# Patient Record
Sex: FEMALE | Race: WHITE | NOT HISPANIC OR LATINO | Employment: UNEMPLOYED | URBAN - NONMETROPOLITAN AREA
[De-identification: names, ages, dates, MRNs, and addresses within clinical notes are randomized per-mention and may not be internally consistent; named-entity substitution may affect disease eponyms.]

---

## 2017-02-27 ENCOUNTER — APPOINTMENT (OUTPATIENT)
Dept: GENERAL RADIOLOGY | Facility: HOSPITAL | Age: 42
End: 2017-02-27

## 2017-02-27 ENCOUNTER — HOSPITAL ENCOUNTER (EMERGENCY)
Facility: HOSPITAL | Age: 42
Discharge: HOME | End: 2017-02-27
Attending: FAMILY MEDICINE | Admitting: EMERGENCY MEDICINE

## 2017-02-27 ENCOUNTER — APPOINTMENT (OUTPATIENT)
Dept: CT IMAGING | Facility: HOSPITAL | Age: 42
End: 2017-02-27

## 2017-02-27 VITALS
SYSTOLIC BLOOD PRESSURE: 117 MMHG | HEART RATE: 96 BPM | WEIGHT: 278 LBS | DIASTOLIC BLOOD PRESSURE: 91 MMHG | HEIGHT: 64 IN | RESPIRATION RATE: 16 BRPM | BODY MASS INDEX: 47.46 KG/M2 | TEMPERATURE: 98.4 F | OXYGEN SATURATION: 99 %

## 2017-02-27 DIAGNOSIS — R11.2 NON-INTRACTABLE VOMITING WITH NAUSEA, UNSPECIFIED VOMITING TYPE: Primary | ICD-10-CM

## 2017-02-27 LAB
ABO GROUP BLD: NORMAL
ALBUMIN SERPL-MCNC: 4.4 G/DL (ref 3.5–5)
ALBUMIN/GLOB SERPL: 1.1 G/DL (ref 1.5–2.5)
ALP SERPL-CCNC: 77 U/L (ref 35–104)
ALT SERPL W P-5'-P-CCNC: 33 U/L (ref 10–36)
AMPHET+METHAMPHET UR QL: NEGATIVE
AMYLASE SERPL-CCNC: 67 U/L (ref 28–100)
ANION GAP SERPL CALCULATED.3IONS-SCNC: 8.6 MMOL/L (ref 3.6–11.2)
APTT PPP: 27 SECONDS (ref 24.4–31)
AST SERPL-CCNC: 25 U/L (ref 10–30)
B-HCG UR QL: NEGATIVE
BACTERIA UR QL AUTO: ABNORMAL /HPF
BARBITURATES UR QL SCN: NEGATIVE
BASOPHILS # BLD AUTO: 0.04 10*3/MM3 (ref 0–0.3)
BASOPHILS NFR BLD AUTO: 0.4 % (ref 0–2)
BENZODIAZ UR QL SCN: NEGATIVE
BILIRUB SERPL-MCNC: 0.4 MG/DL (ref 0.2–1.8)
BILIRUB UR QL STRIP: NEGATIVE
BLD GP AB SCN SERPL QL: NEGATIVE
BUN BLD-MCNC: 10 MG/DL (ref 7–21)
BUN/CREAT SERPL: 10.8 (ref 7–25)
CALCIUM SPEC-SCNC: 9.9 MG/DL (ref 7.7–10)
CANNABINOIDS SERPL QL: NEGATIVE
CHLORIDE SERPL-SCNC: 104 MMOL/L (ref 99–112)
CLARITY UR: CLEAR
CO2 SERPL-SCNC: 29.4 MMOL/L (ref 24.3–31.9)
COCAINE UR QL: NEGATIVE
COLOR UR: YELLOW
CREAT BLD-MCNC: 0.93 MG/DL (ref 0.43–1.29)
CRP SERPL-MCNC: 0.91 MG/DL (ref 0–0.99)
D-LACTATE SERPL-SCNC: 1.2 MMOL/L (ref 0.5–2)
DEPRECATED RDW RBC AUTO: 41 FL (ref 37–54)
EOSINOPHIL # BLD AUTO: 0.28 10*3/MM3 (ref 0–0.7)
EOSINOPHIL NFR BLD AUTO: 2.8 % (ref 0–5)
ERYTHROCYTE [DISTWIDTH] IN BLOOD BY AUTOMATED COUNT: 13.3 % (ref 11.5–14.5)
GFR SERPL CREATININE-BSD FRML MDRD: 66 ML/MIN/1.73
GLOBULIN UR ELPH-MCNC: 3.9 GM/DL
GLUCOSE BLD-MCNC: 122 MG/DL (ref 70–110)
GLUCOSE UR STRIP-MCNC: NEGATIVE MG/DL
HCG SERPL QL: NEGATIVE
HCT VFR BLD AUTO: 44.4 % (ref 37–47)
HGB BLD-MCNC: 14.7 G/DL (ref 12–16)
HGB UR QL STRIP.AUTO: NEGATIVE
HYALINE CASTS UR QL AUTO: ABNORMAL /LPF
IMM GRANULOCYTES # BLD: 0.02 10*3/MM3 (ref 0–0.03)
IMM GRANULOCYTES NFR BLD: 0.2 % (ref 0–0.5)
INR PPP: 0.9 (ref 0.8–1.1)
KETONES UR QL STRIP: NEGATIVE
LEUKOCYTE ESTERASE UR QL STRIP.AUTO: ABNORMAL
LIPASE SERPL-CCNC: 36 U/L (ref 13–60)
LYMPHOCYTES # BLD AUTO: 4.38 10*3/MM3 (ref 1–3)
LYMPHOCYTES NFR BLD AUTO: 43.9 % (ref 21–51)
MCH RBC QN AUTO: 28.7 PG (ref 27–33)
MCHC RBC AUTO-ENTMCNC: 33.1 G/DL (ref 33–37)
MCV RBC AUTO: 86.5 FL (ref 80–94)
METHADONE UR QL SCN: NEGATIVE
MONOCYTES # BLD AUTO: 0.71 10*3/MM3 (ref 0.1–0.9)
MONOCYTES NFR BLD AUTO: 7.1 % (ref 0–10)
NEUTROPHILS # BLD AUTO: 4.54 10*3/MM3 (ref 1.4–6.5)
NEUTROPHILS NFR BLD AUTO: 45.6 % (ref 30–70)
NITRITE UR QL STRIP: NEGATIVE
OPIATES UR QL: NEGATIVE
OSMOLALITY SERPL CALC.SUM OF ELEC: 283.5 MOSM/KG (ref 273–305)
OXYCODONE UR QL SCN: NEGATIVE
PCP UR QL SCN: NEGATIVE
PH UR STRIP.AUTO: 5.5 [PH] (ref 5–8)
PLATELET # BLD AUTO: 308 10*3/MM3 (ref 130–400)
PMV BLD AUTO: 8.9 FL (ref 6–10)
POTASSIUM BLD-SCNC: 4 MMOL/L (ref 3.5–5.3)
PROPOXYPH UR QL: NEGATIVE
PROT SERPL-MCNC: 8.3 G/DL (ref 6–8)
PROT UR QL STRIP: NEGATIVE
PROTHROMBIN TIME: 9.9 SECONDS (ref 9.8–11.9)
RBC # BLD AUTO: 5.13 10*6/MM3 (ref 4.2–5.4)
RBC # UR: ABNORMAL /HPF
REF LAB TEST METHOD: ABNORMAL
RH BLD: POSITIVE
SODIUM BLD-SCNC: 142 MMOL/L (ref 135–153)
SP GR UR STRIP: <=1.005 (ref 1–1.03)
SQUAMOUS #/AREA URNS HPF: ABNORMAL /HPF
TROPONIN I SERPL-MCNC: <0.006 NG/ML
TROPONIN I SERPL-MCNC: <0.006 NG/ML
UROBILINOGEN UR QL STRIP: ABNORMAL
WBC NRBC COR # BLD: 9.97 10*3/MM3 (ref 4.5–12.5)
WBC UR QL AUTO: ABNORMAL /HPF

## 2017-02-27 PROCEDURE — 85610 PROTHROMBIN TIME: CPT | Performed by: FAMILY MEDICINE

## 2017-02-27 PROCEDURE — 87086 URINE CULTURE/COLONY COUNT: CPT | Performed by: FAMILY MEDICINE

## 2017-02-27 PROCEDURE — 71275 CT ANGIOGRAPHY CHEST: CPT

## 2017-02-27 PROCEDURE — 80307 DRUG TEST PRSMV CHEM ANLYZR: CPT | Performed by: FAMILY MEDICINE

## 2017-02-27 PROCEDURE — 86900 BLOOD TYPING SEROLOGIC ABO: CPT

## 2017-02-27 PROCEDURE — 86850 RBC ANTIBODY SCREEN: CPT

## 2017-02-27 PROCEDURE — 81025 URINE PREGNANCY TEST: CPT | Performed by: FAMILY MEDICINE

## 2017-02-27 PROCEDURE — 93010 ELECTROCARDIOGRAM REPORT: CPT | Performed by: INTERNAL MEDICINE

## 2017-02-27 PROCEDURE — 86901 BLOOD TYPING SEROLOGIC RH(D): CPT

## 2017-02-27 PROCEDURE — 83690 ASSAY OF LIPASE: CPT | Performed by: FAMILY MEDICINE

## 2017-02-27 PROCEDURE — 81001 URINALYSIS AUTO W/SCOPE: CPT | Performed by: FAMILY MEDICINE

## 2017-02-27 PROCEDURE — 84703 CHORIONIC GONADOTROPIN ASSAY: CPT | Performed by: FAMILY MEDICINE

## 2017-02-27 PROCEDURE — 83605 ASSAY OF LACTIC ACID: CPT | Performed by: FAMILY MEDICINE

## 2017-02-27 PROCEDURE — 86140 C-REACTIVE PROTEIN: CPT | Performed by: FAMILY MEDICINE

## 2017-02-27 PROCEDURE — 71010 HC CHEST PA OR AP: CPT

## 2017-02-27 PROCEDURE — 99284 EMERGENCY DEPT VISIT MOD MDM: CPT

## 2017-02-27 PROCEDURE — 71275 CT ANGIOGRAPHY CHEST: CPT | Performed by: RADIOLOGY

## 2017-02-27 PROCEDURE — 85025 COMPLETE CBC W/AUTO DIFF WBC: CPT | Performed by: FAMILY MEDICINE

## 2017-02-27 PROCEDURE — 84484 ASSAY OF TROPONIN QUANT: CPT | Performed by: FAMILY MEDICINE

## 2017-02-27 PROCEDURE — 93005 ELECTROCARDIOGRAM TRACING: CPT | Performed by: FAMILY MEDICINE

## 2017-02-27 PROCEDURE — 96374 THER/PROPH/DIAG INJ IV PUSH: CPT

## 2017-02-27 PROCEDURE — 80053 COMPREHEN METABOLIC PANEL: CPT | Performed by: FAMILY MEDICINE

## 2017-02-27 PROCEDURE — 25010000002 LORAZEPAM PER 2 MG: Performed by: FAMILY MEDICINE

## 2017-02-27 PROCEDURE — 85730 THROMBOPLASTIN TIME PARTIAL: CPT | Performed by: FAMILY MEDICINE

## 2017-02-27 PROCEDURE — 82150 ASSAY OF AMYLASE: CPT | Performed by: FAMILY MEDICINE

## 2017-02-27 PROCEDURE — 36415 COLL VENOUS BLD VENIPUNCTURE: CPT

## 2017-02-27 PROCEDURE — 0 IOPAMIDOL PER 1 ML: Performed by: EMERGENCY MEDICINE

## 2017-02-27 PROCEDURE — 87040 BLOOD CULTURE FOR BACTERIA: CPT | Performed by: FAMILY MEDICINE

## 2017-02-27 PROCEDURE — 71010 XR CHEST 1 VW: CPT | Performed by: RADIOLOGY

## 2017-02-27 RX ORDER — SODIUM CHLORIDE 0.9 % (FLUSH) 0.9 %
10 SYRINGE (ML) INJECTION AS NEEDED
Status: DISCONTINUED | OUTPATIENT
Start: 2017-02-27 | End: 2017-02-27 | Stop reason: HOSPADM

## 2017-02-27 RX ORDER — LORAZEPAM 2 MG/ML
0.25 INJECTION INTRAMUSCULAR ONCE
Status: COMPLETED | OUTPATIENT
Start: 2017-02-27 | End: 2017-02-27

## 2017-02-27 RX ORDER — ONDANSETRON 4 MG/1
4 TABLET, FILM COATED ORAL EVERY 6 HOURS PRN
Qty: 10 TABLET | Refills: 0 | OUTPATIENT
Start: 2017-02-27 | End: 2019-06-29

## 2017-02-27 RX ORDER — ONDANSETRON 2 MG/ML
4 INJECTION INTRAMUSCULAR; INTRAVENOUS ONCE
Status: DISCONTINUED | OUTPATIENT
Start: 2017-02-27 | End: 2017-02-27 | Stop reason: HOSPADM

## 2017-02-27 RX ADMIN — LORAZEPAM 0.25 MG: 2 INJECTION INTRAMUSCULAR; INTRAVENOUS at 05:52

## 2017-02-27 RX ADMIN — IOPAMIDOL 100 ML: 755 INJECTION, SOLUTION INTRAVENOUS at 08:14

## 2017-03-01 LAB — BACTERIA SPEC AEROBE CULT: NORMAL

## 2017-03-04 LAB
BACTERIA SPEC AEROBE CULT: NORMAL
BACTERIA SPEC AEROBE CULT: NORMAL

## 2017-03-21 LAB
FLUAV AG NPH QL: NEGATIVE
FLUBV AG NPH QL IA: NEGATIVE
S PYO AG THROAT QL: NEGATIVE

## 2017-03-21 PROCEDURE — 87880 STREP A ASSAY W/OPTIC: CPT | Performed by: EMERGENCY MEDICINE

## 2017-03-21 PROCEDURE — 99283 EMERGENCY DEPT VISIT LOW MDM: CPT

## 2017-03-21 PROCEDURE — 87804 INFLUENZA ASSAY W/OPTIC: CPT | Performed by: EMERGENCY MEDICINE

## 2017-03-21 PROCEDURE — 87081 CULTURE SCREEN ONLY: CPT | Performed by: EMERGENCY MEDICINE

## 2017-03-22 ENCOUNTER — APPOINTMENT (OUTPATIENT)
Dept: GENERAL RADIOLOGY | Facility: HOSPITAL | Age: 42
End: 2017-03-22

## 2017-03-22 ENCOUNTER — HOSPITAL ENCOUNTER (EMERGENCY)
Facility: HOSPITAL | Age: 42
Discharge: HOME | End: 2017-03-22
Attending: EMERGENCY MEDICINE | Admitting: EMERGENCY MEDICINE

## 2017-03-22 VITALS
HEART RATE: 110 BPM | SYSTOLIC BLOOD PRESSURE: 110 MMHG | HEIGHT: 64 IN | BODY MASS INDEX: 47.46 KG/M2 | OXYGEN SATURATION: 97 % | WEIGHT: 278 LBS | TEMPERATURE: 98.7 F | DIASTOLIC BLOOD PRESSURE: 78 MMHG | RESPIRATION RATE: 16 BRPM

## 2017-03-22 DIAGNOSIS — Z20.818 STREP THROAT EXPOSURE: ICD-10-CM

## 2017-03-22 DIAGNOSIS — J06.9 ACUTE UPPER RESPIRATORY INFECTION: Primary | ICD-10-CM

## 2017-03-22 RX ORDER — CETIRIZINE HYDROCHLORIDE 10 MG/1
10 TABLET ORAL DAILY
Qty: 30 TABLET | Refills: 0 | OUTPATIENT
Start: 2017-03-22 | End: 2019-06-29

## 2017-03-22 RX ORDER — FLUTICASONE PROPIONATE 50 MCG
2 SPRAY, SUSPENSION (ML) NASAL DAILY
Qty: 1 EACH | Refills: 0 | Status: SHIPPED | OUTPATIENT
Start: 2017-03-22 | End: 2017-04-21

## 2017-03-22 RX ORDER — AZITHROMYCIN 250 MG/1
TABLET, FILM COATED ORAL
Qty: 6 TABLET | Refills: 0 | OUTPATIENT
Start: 2017-03-22 | End: 2019-06-29

## 2017-03-22 NOTE — ED PROVIDER NOTES
Subjective   Patient is a 41 y.o. female presenting with URI.   History provided by:  Patient   used: No    URI   Presenting symptoms: congestion, cough, ear pain, rhinorrhea and sore throat    Severity:  Mild  Onset quality:  Sudden  Timing:  Constant  Progression:  Worsening  Chronicity:  New  Relieved by:  Nothing  Worsened by:  Nothing  Ineffective treatments:  None tried  Risk factors: sick contacts        Review of Systems   Constitutional: Negative.    HENT: Positive for congestion, ear pain, rhinorrhea and sore throat.    Eyes: Negative.    Respiratory: Positive for cough.    Cardiovascular: Negative.    Gastrointestinal: Negative.    Endocrine: Negative.    Genitourinary: Negative.    Musculoskeletal: Negative.    Skin: Negative.    Allergic/Immunologic: Negative.    Neurological: Negative.    Hematological: Negative.    Psychiatric/Behavioral: Negative.    All other systems reviewed and are negative.      Past Medical History   Diagnosis Date   • Anxiety    • Depression    • Hypertension    • Pulmonary embolism        Allergies   Allergen Reactions   • Codeine    • Prozac [Fluoxetine Hcl]        Past Surgical History   Procedure Laterality Date   • Eye surgery     • Pulmonary embolism surgery     • Cholecystectomy         History reviewed. No pertinent family history.    Social History     Social History   • Marital status:      Spouse name: N/A   • Number of children: N/A   • Years of education: N/A     Social History Main Topics   • Smoking status: Never Smoker   • Smokeless tobacco: None   • Alcohol use No   • Drug use: No   • Sexual activity: Not Asked     Other Topics Concern   • None     Social History Narrative   • None           Objective   Physical Exam   Constitutional: She is oriented to person, place, and time. She appears well-developed and well-nourished.   HENT:   Head: Normocephalic and atraumatic.   Right Ear: External ear normal.   Left Ear: External ear normal.    Nose: Rhinorrhea present.   Mouth/Throat: Oropharynx is clear and moist.   Eyes: EOM are normal. Pupils are equal, round, and reactive to light.   Neck: Normal range of motion. Neck supple.   Cardiovascular: Normal rate, regular rhythm, normal heart sounds and intact distal pulses.    Pulmonary/Chest: Effort normal and breath sounds normal.   Abdominal: Soft. Bowel sounds are normal.   Musculoskeletal: Normal range of motion.   Neurological: She is alert and oriented to person, place, and time.   Skin: Skin is warm and dry.   Psychiatric: She has a normal mood and affect. Her behavior is normal. Judgment and thought content normal.   Nursing note and vitals reviewed.      Procedures         ED Course  ED Course                  MDM    Final diagnoses:   Acute upper respiratory infection   Strep throat exposure            CHANELL Nichols  03/22/17 0013

## 2017-03-23 LAB — BACTERIA SPEC AEROBE CULT: NORMAL

## 2019-06-29 ENCOUNTER — HOSPITAL ENCOUNTER (EMERGENCY)
Facility: HOSPITAL | Age: 44
Discharge: HOME | End: 2019-06-29
Attending: EMERGENCY MEDICINE | Admitting: EMERGENCY MEDICINE

## 2019-06-29 ENCOUNTER — APPOINTMENT (OUTPATIENT)
Dept: GENERAL RADIOLOGY | Facility: HOSPITAL | Age: 44
End: 2019-06-29

## 2019-06-29 VITALS
HEIGHT: 65 IN | TEMPERATURE: 98.3 F | WEIGHT: 293 LBS | DIASTOLIC BLOOD PRESSURE: 97 MMHG | SYSTOLIC BLOOD PRESSURE: 139 MMHG | OXYGEN SATURATION: 100 % | HEART RATE: 80 BPM | RESPIRATION RATE: 20 BRPM | BODY MASS INDEX: 48.82 KG/M2

## 2019-06-29 DIAGNOSIS — R00.0 SINUS TACHYCARDIA: Primary | ICD-10-CM

## 2019-06-29 LAB
ALBUMIN SERPL-MCNC: 3.99 G/DL (ref 3.5–5.2)
ALBUMIN/GLOB SERPL: 1.1 G/DL
ALP SERPL-CCNC: 81 U/L (ref 39–117)
ALT SERPL W P-5'-P-CCNC: 67 U/L (ref 1–33)
ANION GAP SERPL CALCULATED.3IONS-SCNC: 13.7 MMOL/L (ref 5–15)
AST SERPL-CCNC: 48 U/L (ref 1–32)
BASOPHILS # BLD AUTO: 0.03 10*3/MM3 (ref 0–0.2)
BASOPHILS NFR BLD AUTO: 0.3 % (ref 0–1.5)
BILIRUB SERPL-MCNC: 0.5 MG/DL (ref 0.2–1.2)
BILIRUB UR QL STRIP: NEGATIVE
BUN BLD-MCNC: 10 MG/DL (ref 6–20)
BUN/CREAT SERPL: 11.2 (ref 7–25)
CALCIUM SPEC-SCNC: 9.8 MG/DL (ref 8.6–10.5)
CHLORIDE SERPL-SCNC: 101 MMOL/L (ref 98–107)
CLARITY UR: CLEAR
CO2 SERPL-SCNC: 26.3 MMOL/L (ref 22–29)
COLOR UR: YELLOW
CREAT BLD-MCNC: 0.89 MG/DL (ref 0.57–1)
CRP SERPL-MCNC: 1.21 MG/DL (ref 0–0.5)
D-LACTATE SERPL-SCNC: 1.3 MMOL/L (ref 0.5–2)
DEPRECATED RDW RBC AUTO: 43.1 FL (ref 37–54)
EOSINOPHIL # BLD AUTO: 0.25 10*3/MM3 (ref 0–0.4)
EOSINOPHIL NFR BLD AUTO: 2.4 % (ref 0.3–6.2)
ERYTHROCYTE [DISTWIDTH] IN BLOOD BY AUTOMATED COUNT: 13.4 % (ref 12.3–15.4)
GFR SERPL CREATININE-BSD FRML MDRD: 68 ML/MIN/1.73
GLOBULIN UR ELPH-MCNC: 3.6 GM/DL
GLUCOSE BLD-MCNC: 169 MG/DL (ref 65–99)
GLUCOSE UR STRIP-MCNC: NEGATIVE MG/DL
HCG SERPL QL: NEGATIVE
HCT VFR BLD AUTO: 42.2 % (ref 34–46.6)
HGB BLD-MCNC: 13.7 G/DL (ref 12–15.9)
HGB UR QL STRIP.AUTO: NEGATIVE
HOLD SPECIMEN: NORMAL
HOLD SPECIMEN: NORMAL
IMM GRANULOCYTES # BLD AUTO: 0.01 10*3/MM3 (ref 0–0.05)
IMM GRANULOCYTES NFR BLD AUTO: 0.1 % (ref 0–0.5)
KETONES UR QL STRIP: ABNORMAL
LEUKOCYTE ESTERASE UR QL STRIP.AUTO: NEGATIVE
LYMPHOCYTES # BLD AUTO: 3.31 10*3/MM3 (ref 0.7–3.1)
LYMPHOCYTES NFR BLD AUTO: 31.5 % (ref 19.6–45.3)
MAGNESIUM SERPL-MCNC: 2.1 MG/DL (ref 1.6–2.6)
MCH RBC QN AUTO: 29.1 PG (ref 26.6–33)
MCHC RBC AUTO-ENTMCNC: 32.5 G/DL (ref 31.5–35.7)
MCV RBC AUTO: 89.6 FL (ref 79–97)
MONOCYTES # BLD AUTO: 0.62 10*3/MM3 (ref 0.1–0.9)
MONOCYTES NFR BLD AUTO: 5.9 % (ref 5–12)
NEUTROPHILS # BLD AUTO: 6.29 10*3/MM3 (ref 1.7–7)
NEUTROPHILS NFR BLD AUTO: 59.8 % (ref 42.7–76)
NITRITE UR QL STRIP: NEGATIVE
PH UR STRIP.AUTO: 5.5 [PH] (ref 5–8)
PLATELET # BLD AUTO: 340 10*3/MM3 (ref 140–450)
PMV BLD AUTO: 9.2 FL (ref 6–12)
POTASSIUM BLD-SCNC: 4 MMOL/L (ref 3.5–5.2)
PROT SERPL-MCNC: 7.6 G/DL (ref 6–8.5)
PROT UR QL STRIP: NEGATIVE
RBC # BLD AUTO: 4.71 10*6/MM3 (ref 3.77–5.28)
SODIUM BLD-SCNC: 141 MMOL/L (ref 136–145)
SP GR UR STRIP: 1.02 (ref 1–1.03)
TROPONIN T SERPL-MCNC: <0.01 NG/ML (ref 0–0.03)
TSH SERPL DL<=0.05 MIU/L-ACNC: 3.61 MIU/ML (ref 0.27–4.2)
UROBILINOGEN UR QL STRIP: ABNORMAL
WBC NRBC COR # BLD: 10.51 10*3/MM3 (ref 3.4–10.8)
WHOLE BLOOD HOLD SPECIMEN: NORMAL
WHOLE BLOOD HOLD SPECIMEN: NORMAL

## 2019-06-29 PROCEDURE — 96361 HYDRATE IV INFUSION ADD-ON: CPT

## 2019-06-29 PROCEDURE — 81003 URINALYSIS AUTO W/O SCOPE: CPT | Performed by: EMERGENCY MEDICINE

## 2019-06-29 PROCEDURE — 87147 CULTURE TYPE IMMUNOLOGIC: CPT | Performed by: EMERGENCY MEDICINE

## 2019-06-29 PROCEDURE — 96374 THER/PROPH/DIAG INJ IV PUSH: CPT

## 2019-06-29 PROCEDURE — 80050 GENERAL HEALTH PANEL: CPT | Performed by: EMERGENCY MEDICINE

## 2019-06-29 PROCEDURE — 71045 X-RAY EXAM CHEST 1 VIEW: CPT

## 2019-06-29 PROCEDURE — 83605 ASSAY OF LACTIC ACID: CPT | Performed by: EMERGENCY MEDICINE

## 2019-06-29 PROCEDURE — 87150 DNA/RNA AMPLIFIED PROBE: CPT | Performed by: EMERGENCY MEDICINE

## 2019-06-29 PROCEDURE — 87040 BLOOD CULTURE FOR BACTERIA: CPT | Performed by: EMERGENCY MEDICINE

## 2019-06-29 PROCEDURE — 93005 ELECTROCARDIOGRAM TRACING: CPT | Performed by: EMERGENCY MEDICINE

## 2019-06-29 PROCEDURE — 83735 ASSAY OF MAGNESIUM: CPT | Performed by: EMERGENCY MEDICINE

## 2019-06-29 PROCEDURE — 71045 X-RAY EXAM CHEST 1 VIEW: CPT | Performed by: RADIOLOGY

## 2019-06-29 PROCEDURE — 99284 EMERGENCY DEPT VISIT MOD MDM: CPT

## 2019-06-29 PROCEDURE — 84484 ASSAY OF TROPONIN QUANT: CPT | Performed by: EMERGENCY MEDICINE

## 2019-06-29 PROCEDURE — 84703 CHORIONIC GONADOTROPIN ASSAY: CPT | Performed by: EMERGENCY MEDICINE

## 2019-06-29 PROCEDURE — 86140 C-REACTIVE PROTEIN: CPT | Performed by: EMERGENCY MEDICINE

## 2019-06-29 RX ORDER — PURIFIED WATER 986 MG/ML
SOLUTION OPHTHALMIC
Status: COMPLETED
Start: 2019-06-29 | End: 2019-06-29

## 2019-06-29 RX ORDER — OMEPRAZOLE 20 MG/1
20 CAPSULE, DELAYED RELEASE ORAL DAILY
COMMUNITY

## 2019-06-29 RX ORDER — BUSPIRONE HYDROCHLORIDE 10 MG/1
15 TABLET ORAL DAILY
Status: ON HOLD | COMMUNITY
End: 2021-10-20

## 2019-06-29 RX ORDER — LISINOPRIL 20 MG/1
20 TABLET ORAL DAILY
COMMUNITY
End: 2022-07-19 | Stop reason: ALTCHOICE

## 2019-06-29 RX ORDER — METOPROLOL SUCCINATE 50 MG/1
50 TABLET, EXTENDED RELEASE ORAL NIGHTLY
COMMUNITY

## 2019-06-29 RX ORDER — IMIPRAMINE HCL 50 MG
50 TABLET ORAL NIGHTLY
Status: ON HOLD | COMMUNITY
End: 2021-10-20

## 2019-06-29 RX ORDER — LEVOTHYROXINE SODIUM 0.07 MG/1
75 TABLET ORAL DAILY
COMMUNITY

## 2019-06-29 RX ORDER — SODIUM CHLORIDE 9 MG/ML
125 INJECTION, SOLUTION INTRAVENOUS CONTINUOUS
Status: DISCONTINUED | OUTPATIENT
Start: 2019-06-29 | End: 2019-06-29 | Stop reason: HOSPADM

## 2019-06-29 RX ORDER — RANITIDINE 150 MG/1
150 TABLET ORAL 2 TIMES DAILY
Status: ON HOLD | COMMUNITY
End: 2021-10-20

## 2019-06-29 RX ORDER — PURIFIED WATER 986 MG/ML
SOLUTION OPHTHALMIC ONCE AS NEEDED
Status: COMPLETED | OUTPATIENT
Start: 2019-06-29 | End: 2019-06-29

## 2019-06-29 RX ORDER — TETRACAINE HYDROCHLORIDE 5 MG/ML
2 SOLUTION OPHTHALMIC ONCE
Status: COMPLETED | OUTPATIENT
Start: 2019-06-29 | End: 2019-06-29

## 2019-06-29 RX ORDER — METOPROLOL TARTRATE 5 MG/5ML
5 INJECTION INTRAVENOUS ONCE
Status: COMPLETED | OUTPATIENT
Start: 2019-06-29 | End: 2019-06-29

## 2019-06-29 RX ADMIN — PURIFIED WATER: 986 SOLUTION OPHTHALMIC at 16:30

## 2019-06-29 RX ADMIN — TETRACAINE HYDROCHLORIDE 2 DROP: 5 SOLUTION OPHTHALMIC at 16:29

## 2019-06-29 RX ADMIN — SODIUM CHLORIDE 125 ML/HR: 9 INJECTION, SOLUTION INTRAVENOUS at 14:59

## 2019-06-29 RX ADMIN — METOPROLOL TARTRATE 25 MG: 25 TABLET, FILM COATED ORAL at 15:07

## 2019-06-29 RX ADMIN — SODIUM CHLORIDE 1000 ML: 9 INJECTION, SOLUTION INTRAVENOUS at 14:59

## 2019-06-29 RX ADMIN — METOROPROLOL TARTRATE 1 MG: 5 INJECTION, SOLUTION INTRAVENOUS at 15:05

## 2019-06-29 RX ADMIN — FLUORESCEIN SODIUM 1 STRIP: 1 STRIP OPHTHALMIC at 16:30

## 2019-06-30 LAB — BACTERIA BLD CULT: ABNORMAL

## 2019-07-02 LAB
BACTERIA SPEC AEROBE CULT: ABNORMAL
GRAM STN SPEC: ABNORMAL
ISOLATED FROM: ABNORMAL

## 2019-07-04 LAB — BACTERIA SPEC AEROBE CULT: NORMAL

## 2021-10-18 ENCOUNTER — HOSPITAL ENCOUNTER (EMERGENCY)
Dept: HOSPITAL 79 - ER1 | Age: 46
Discharge: HOME | End: 2021-10-18
Payer: COMMERCIAL

## 2021-10-18 DIAGNOSIS — Z88.5: ICD-10-CM

## 2021-10-18 DIAGNOSIS — I10: ICD-10-CM

## 2021-10-18 DIAGNOSIS — E11.9: ICD-10-CM

## 2021-10-18 DIAGNOSIS — Z90.710: ICD-10-CM

## 2021-10-18 DIAGNOSIS — M79.605: ICD-10-CM

## 2021-10-18 DIAGNOSIS — M79.604: ICD-10-CM

## 2021-10-18 DIAGNOSIS — R74.01: Primary | ICD-10-CM

## 2021-10-18 DIAGNOSIS — Z79.899: ICD-10-CM

## 2021-10-18 DIAGNOSIS — R79.1: ICD-10-CM

## 2021-10-18 LAB
BUN/CREATININE RATIO: 9 (ref 0–10)
HGB BLD-MCNC: 14.9 GM/DL (ref 12.3–15.3)
RED BLOOD COUNT: 5 M/UL (ref 4–5.1)
WHITE BLOOD COUNT: 8.3 K/UL (ref 4.5–11)

## 2021-10-20 ENCOUNTER — HOSPITAL ENCOUNTER (OUTPATIENT)
Facility: HOSPITAL | Age: 46
Setting detail: OBSERVATION
Discharge: HOME | End: 2021-10-21
Attending: EMERGENCY MEDICINE | Admitting: INTERNAL MEDICINE

## 2021-10-20 ENCOUNTER — APPOINTMENT (OUTPATIENT)
Dept: GENERAL RADIOLOGY | Facility: HOSPITAL | Age: 46
End: 2021-10-20

## 2021-10-20 DIAGNOSIS — R07.9 CHEST PAIN, UNSPECIFIED TYPE: Primary | ICD-10-CM

## 2021-10-20 LAB
ALBUMIN SERPL-MCNC: 4.21 G/DL (ref 3.5–5.2)
ALBUMIN/GLOB SERPL: 1.2 G/DL
ALP SERPL-CCNC: 77 U/L (ref 39–117)
ALT SERPL W P-5'-P-CCNC: 38 U/L (ref 1–33)
ANION GAP SERPL CALCULATED.3IONS-SCNC: 8.3 MMOL/L (ref 5–15)
AST SERPL-CCNC: 27 U/L (ref 1–32)
BASOPHILS # BLD AUTO: 0.07 10*3/MM3 (ref 0–0.2)
BASOPHILS NFR BLD AUTO: 0.6 % (ref 0–1.5)
BILIRUB SERPL-MCNC: 0.5 MG/DL (ref 0–1.2)
BUN SERPL-MCNC: 9 MG/DL (ref 6–20)
BUN/CREAT SERPL: 10.7 (ref 7–25)
CALCIUM SPEC-SCNC: 9.4 MG/DL (ref 8.6–10.5)
CHLORIDE SERPL-SCNC: 104 MMOL/L (ref 98–107)
CO2 SERPL-SCNC: 26.7 MMOL/L (ref 22–29)
CREAT SERPL-MCNC: 0.84 MG/DL (ref 0.57–1)
DEPRECATED RDW RBC AUTO: 40.4 FL (ref 37–54)
EOSINOPHIL # BLD AUTO: 0.2 10*3/MM3 (ref 0–0.4)
EOSINOPHIL NFR BLD AUTO: 1.8 % (ref 0.3–6.2)
ERYTHROCYTE [DISTWIDTH] IN BLOOD BY AUTOMATED COUNT: 12.5 % (ref 12.3–15.4)
FLUAV RNA RESP QL NAA+PROBE: NOT DETECTED
FLUBV RNA RESP QL NAA+PROBE: NOT DETECTED
GFR SERPL CREATININE-BSD FRML MDRD: 72 ML/MIN/1.73
GLOBULIN UR ELPH-MCNC: 3.5 GM/DL
GLUCOSE BLDC GLUCOMTR-MCNC: 119 MG/DL (ref 70–130)
GLUCOSE SERPL-MCNC: 156 MG/DL (ref 65–99)
HCT VFR BLD AUTO: 43.8 % (ref 34–46.6)
HGB BLD-MCNC: 14.3 G/DL (ref 12–15.9)
HOLD SPECIMEN: NORMAL
HOLD SPECIMEN: NORMAL
IMM GRANULOCYTES # BLD AUTO: 0.05 10*3/MM3 (ref 0–0.05)
IMM GRANULOCYTES NFR BLD AUTO: 0.4 % (ref 0–0.5)
LYMPHOCYTES # BLD AUTO: 3.2 10*3/MM3 (ref 0.7–3.1)
LYMPHOCYTES NFR BLD AUTO: 28.6 % (ref 19.6–45.3)
MAGNESIUM SERPL-MCNC: 2 MG/DL (ref 1.6–2.6)
MCH RBC QN AUTO: 28.8 PG (ref 26.6–33)
MCHC RBC AUTO-ENTMCNC: 32.6 G/DL (ref 31.5–35.7)
MCV RBC AUTO: 88.3 FL (ref 79–97)
MONOCYTES # BLD AUTO: 0.76 10*3/MM3 (ref 0.1–0.9)
MONOCYTES NFR BLD AUTO: 6.8 % (ref 5–12)
NEUTROPHILS NFR BLD AUTO: 6.91 10*3/MM3 (ref 1.7–7)
NEUTROPHILS NFR BLD AUTO: 61.8 % (ref 42.7–76)
NRBC BLD AUTO-RTO: 0 /100 WBC (ref 0–0.2)
NT-PROBNP SERPL-MCNC: 201.5 PG/ML (ref 0–450)
PLATELET # BLD AUTO: 296 10*3/MM3 (ref 140–450)
PMV BLD AUTO: 8.9 FL (ref 6–12)
POTASSIUM SERPL-SCNC: 4 MMOL/L (ref 3.5–5.2)
PROT SERPL-MCNC: 7.7 G/DL (ref 6–8.5)
QT INTERVAL: 354 MS
QTC INTERVAL: 408 MS
RBC # BLD AUTO: 4.96 10*6/MM3 (ref 3.77–5.28)
SARS-COV-2 RNA RESP QL NAA+PROBE: NOT DETECTED
SODIUM SERPL-SCNC: 139 MMOL/L (ref 136–145)
TROPONIN T SERPL-MCNC: <0.01 NG/ML (ref 0–0.03)
TROPONIN T SERPL-MCNC: <0.01 NG/ML (ref 0–0.03)
TSH SERPL DL<=0.05 MIU/L-ACNC: 2.75 UIU/ML (ref 0.27–4.2)
WBC # BLD AUTO: 11.19 10*3/MM3 (ref 3.4–10.8)
WHOLE BLOOD HOLD SPECIMEN: NORMAL
WHOLE BLOOD HOLD SPECIMEN: NORMAL

## 2021-10-20 PROCEDURE — 25010000002 ONDANSETRON PER 1 MG: Performed by: EMERGENCY MEDICINE

## 2021-10-20 PROCEDURE — 83880 ASSAY OF NATRIURETIC PEPTIDE: CPT | Performed by: EMERGENCY MEDICINE

## 2021-10-20 PROCEDURE — 83735 ASSAY OF MAGNESIUM: CPT | Performed by: EMERGENCY MEDICINE

## 2021-10-20 PROCEDURE — 71045 X-RAY EXAM CHEST 1 VIEW: CPT | Performed by: RADIOLOGY

## 2021-10-20 PROCEDURE — G0378 HOSPITAL OBSERVATION PER HR: HCPCS

## 2021-10-20 PROCEDURE — 99285 EMERGENCY DEPT VISIT HI MDM: CPT

## 2021-10-20 PROCEDURE — 82962 GLUCOSE BLOOD TEST: CPT

## 2021-10-20 PROCEDURE — 25010000002 HEPARIN (PORCINE) PER 1000 UNITS: Performed by: INTERNAL MEDICINE

## 2021-10-20 PROCEDURE — 87636 SARSCOV2 & INF A&B AMP PRB: CPT | Performed by: EMERGENCY MEDICINE

## 2021-10-20 PROCEDURE — 93005 ELECTROCARDIOGRAM TRACING: CPT | Performed by: EMERGENCY MEDICINE

## 2021-10-20 PROCEDURE — 99220 PR INITIAL OBSERVATION CARE/DAY 70 MINUTES: CPT | Performed by: PHYSICIAN ASSISTANT

## 2021-10-20 PROCEDURE — 96374 THER/PROPH/DIAG INJ IV PUSH: CPT

## 2021-10-20 PROCEDURE — 80053 COMPREHEN METABOLIC PANEL: CPT | Performed by: EMERGENCY MEDICINE

## 2021-10-20 PROCEDURE — C9803 HOPD COVID-19 SPEC COLLECT: HCPCS

## 2021-10-20 PROCEDURE — 96372 THER/PROPH/DIAG INJ SC/IM: CPT

## 2021-10-20 PROCEDURE — 84484 ASSAY OF TROPONIN QUANT: CPT | Performed by: EMERGENCY MEDICINE

## 2021-10-20 PROCEDURE — 71045 X-RAY EXAM CHEST 1 VIEW: CPT

## 2021-10-20 PROCEDURE — 84443 ASSAY THYROID STIM HORMONE: CPT | Performed by: EMERGENCY MEDICINE

## 2021-10-20 PROCEDURE — 85025 COMPLETE CBC W/AUTO DIFF WBC: CPT | Performed by: EMERGENCY MEDICINE

## 2021-10-20 RX ORDER — SODIUM CHLORIDE 0.9 % (FLUSH) 0.9 %
10 SYRINGE (ML) INJECTION AS NEEDED
Status: DISCONTINUED | OUTPATIENT
Start: 2021-10-20 | End: 2021-10-21 | Stop reason: HOSPADM

## 2021-10-20 RX ORDER — ONDANSETRON 2 MG/ML
4 INJECTION INTRAMUSCULAR; INTRAVENOUS ONCE
Status: COMPLETED | OUTPATIENT
Start: 2021-10-20 | End: 2021-10-20

## 2021-10-20 RX ORDER — MORPHINE SULFATE 2 MG/ML
2 INJECTION, SOLUTION INTRAMUSCULAR; INTRAVENOUS ONCE
Status: DISCONTINUED | OUTPATIENT
Start: 2021-10-20 | End: 2021-10-21 | Stop reason: HOSPADM

## 2021-10-20 RX ORDER — HEPARIN SODIUM 5000 [USP'U]/ML
5000 INJECTION, SOLUTION INTRAVENOUS; SUBCUTANEOUS EVERY 8 HOURS SCHEDULED
Status: DISCONTINUED | OUTPATIENT
Start: 2021-10-20 | End: 2021-10-21 | Stop reason: HOSPADM

## 2021-10-20 RX ORDER — NITROGLYCERIN 0.4 MG/1
0.4 TABLET SUBLINGUAL
Status: DISCONTINUED | OUTPATIENT
Start: 2021-10-20 | End: 2021-10-21 | Stop reason: HOSPADM

## 2021-10-20 RX ORDER — MELATONIN
1000 DAILY
COMMUNITY
End: 2022-07-19

## 2021-10-20 RX ORDER — SODIUM CHLORIDE 0.9 % (FLUSH) 0.9 %
10 SYRINGE (ML) INJECTION EVERY 12 HOURS SCHEDULED
Status: DISCONTINUED | OUTPATIENT
Start: 2021-10-20 | End: 2021-10-21 | Stop reason: HOSPADM

## 2021-10-20 RX ORDER — HYDROXYZINE HYDROCHLORIDE 25 MG/1
25 TABLET, FILM COATED ORAL 3 TIMES DAILY PRN
Status: DISCONTINUED | OUTPATIENT
Start: 2021-10-20 | End: 2021-10-21 | Stop reason: HOSPADM

## 2021-10-20 RX ORDER — ASPIRIN 81 MG/1
81 TABLET ORAL DAILY
Status: DISCONTINUED | OUTPATIENT
Start: 2021-10-21 | End: 2021-10-21 | Stop reason: HOSPADM

## 2021-10-20 RX ORDER — ASPIRIN 81 MG/1
324 TABLET, CHEWABLE ORAL ONCE
Status: COMPLETED | OUTPATIENT
Start: 2021-10-20 | End: 2021-10-20

## 2021-10-20 RX ORDER — ATORVASTATIN CALCIUM 40 MG/1
40 TABLET, FILM COATED ORAL NIGHTLY
Status: DISCONTINUED | OUTPATIENT
Start: 2021-10-20 | End: 2021-10-21 | Stop reason: HOSPADM

## 2021-10-20 RX ORDER — NICOTINE POLACRILEX 4 MG
15 LOZENGE BUCCAL
Status: DISCONTINUED | OUTPATIENT
Start: 2021-10-20 | End: 2021-10-21 | Stop reason: HOSPADM

## 2021-10-20 RX ORDER — BUSPIRONE HYDROCHLORIDE 15 MG/1
15 TABLET ORAL 2 TIMES DAILY
COMMUNITY

## 2021-10-20 RX ORDER — DEXTROSE MONOHYDRATE 25 G/50ML
25 INJECTION, SOLUTION INTRAVENOUS
Status: DISCONTINUED | OUTPATIENT
Start: 2021-10-20 | End: 2021-10-21 | Stop reason: HOSPADM

## 2021-10-20 RX ADMIN — HEPARIN SODIUM 5000 UNITS: 5000 INJECTION INTRAVENOUS; SUBCUTANEOUS at 23:58

## 2021-10-20 RX ADMIN — ASPIRIN 324 MG: 81 TABLET, CHEWABLE ORAL at 15:07

## 2021-10-20 RX ADMIN — BUSPIRONE HYDROCHLORIDE 15 MG: 10 TABLET ORAL at 23:57

## 2021-10-20 RX ADMIN — ATORVASTATIN CALCIUM 40 MG: 40 TABLET, FILM COATED ORAL at 23:57

## 2021-10-20 RX ADMIN — NITROGLYCERIN 0.5 INCH: 20 OINTMENT TOPICAL at 15:07

## 2021-10-20 RX ADMIN — ONDANSETRON 4 MG: 2 INJECTION INTRAMUSCULAR; INTRAVENOUS at 15:07

## 2021-10-20 RX ADMIN — HYDROXYZINE HYDROCHLORIDE 25 MG: 25 TABLET ORAL at 23:57

## 2021-10-20 NOTE — ED PROVIDER NOTES
"Subjective   Patient is 46-year-old female who presents with a chief complaint chest pain.  She states that she awoke yesterday morning with substernal pain that she characterizes mostly as \"burning\", but also as \"pressure\".  She states that this radiates to her shoulders and to her upper back.  She reports associated nausea with dry heaves.  Mild shortness of breath.  No diaphoresis, syncope or near syncope, other symptoms or other complaints.  She was seen at [facility] last night, she gave permission for me to obtain records.  She had a negative venous Doppler of both lower extremities.  She had a negative CT chest PE protocol.  I do not see EKGs or troponins paperwork.    Patient does report a history of PE years ago, states that she took Coumadin for about 9 months and is no longer chronically anticoagulated.  Under her past surgical history in the epic system is \"pulmonary embolism surgery\"; patient states that is not the case, she did not have any sort of procedure related to her PE.  She denies any known history of coronary artery disease.  She does report diabetes and hypertension, denies dyslipidemia.  She denies any known coronary artery disease in her parents or siblings.  She states that an aunt  of \"heart failure\" at an advanced age.  Patient states that she does not smoke or drink alcohol.          Review of Systems   All other systems reviewed and are negative.      Past Medical History:   Diagnosis Date   • Anxiety    • Depression    • Disease of thyroid gland    • Hypertension    • Pulmonary embolism (HCC)        Allergies   Allergen Reactions   • Codeine    • Prozac [Fluoxetine Hcl]        Past Surgical History:   Procedure Laterality Date   • CHOLECYSTECTOMY     • EYE SURGERY     • PULMONARY EMBOLISM SURGERY         History reviewed. No pertinent family history.    Social History     Socioeconomic History   • Marital status:    Tobacco Use   • Smoking status: Never Smoker "   Substance and Sexual Activity   • Alcohol use: No   • Drug use: No           Objective   Physical Exam  Vitals and nursing note reviewed.   Constitutional:       General: She is not in acute distress.     Appearance: Normal appearance. She is well-developed. She is not toxic-appearing or diaphoretic.      Comments: Patient appears somewhat anxious, not otherwise in apparent distress.   HENT:      Head: Normocephalic and atraumatic.   Eyes:      General: No scleral icterus.     Pupils: Pupils are equal, round, and reactive to light.   Neck:      Trachea: No tracheal deviation.   Cardiovascular:      Rate and Rhythm: Normal rate and regular rhythm.   Pulmonary:      Effort: Pulmonary effort is normal. No respiratory distress.      Breath sounds: Normal breath sounds.   Chest:      Chest wall: No tenderness.   Abdominal:      General: Bowel sounds are normal.      Palpations: Abdomen is soft.      Tenderness: There is no abdominal tenderness. There is no guarding or rebound.   Musculoskeletal:         General: No tenderness. Normal range of motion.      Cervical back: Normal range of motion and neck supple. No rigidity or tenderness.      Right lower leg: No tenderness. No edema.      Left lower leg: No tenderness. No edema.   Skin:     General: Skin is warm and dry.      Capillary Refill: Capillary refill takes less than 2 seconds.      Coloration: Skin is not pale.   Neurological:      General: No focal deficit present.      Mental Status: She is alert and oriented to person, place, and time.      GCS: GCS eye subscore is 4. GCS verbal subscore is 5. GCS motor subscore is 6.      Motor: No abnormal muscle tone.   Psychiatric:         Mood and Affect: Mood is anxious.         Behavior: Behavior normal.         Procedures  EKG at 1426 shows sinus rhythm, rate 80.  We are interval 150, QRS duration 78, QTc 408 ms.  Nonspecific ST-T abnormality.  No evidence for STEMI.  XR Chest 1 View   Final Result   No evidence of  active or acute cardiopulmonary disease on today's chest   radiograph.       This report was finalized on 10/20/2021 3:52 PM by Dr. Musa Henriquez MD.            Results for orders placed or performed during the hospital encounter of 10/20/21   Comprehensive Metabolic Panel    Specimen: Blood   Result Value Ref Range    Glucose 156 (H) 65 - 99 mg/dL    BUN 9 6 - 20 mg/dL    Creatinine 0.84 0.57 - 1.00 mg/dL    Sodium 139 136 - 145 mmol/L    Potassium 4.0 3.5 - 5.2 mmol/L    Chloride 104 98 - 107 mmol/L    CO2 26.7 22.0 - 29.0 mmol/L    Calcium 9.4 8.6 - 10.5 mg/dL    Total Protein 7.7 6.0 - 8.5 g/dL    Albumin 4.21 3.50 - 5.20 g/dL    ALT (SGPT) 38 (H) 1 - 33 U/L    AST (SGOT) 27 1 - 32 U/L    Alkaline Phosphatase 77 39 - 117 U/L    Total Bilirubin 0.5 0.0 - 1.2 mg/dL    eGFR Non African Amer 72 >60 mL/min/1.73    Globulin 3.5 gm/dL    A/G Ratio 1.2 g/dL    BUN/Creatinine Ratio 10.7 7.0 - 25.0    Anion Gap 8.3 5.0 - 15.0 mmol/L   BNP    Specimen: Blood   Result Value Ref Range    proBNP 201.5 0.0 - 450.0 pg/mL   Troponin    Specimen: Blood   Result Value Ref Range    Troponin T <0.010 0.000 - 0.030 ng/mL   Troponin    Specimen: Arm, Left; Blood   Result Value Ref Range    Troponin T <0.010 0.000 - 0.030 ng/mL   TSH    Specimen: Blood   Result Value Ref Range    TSH 2.750 0.270 - 4.200 uIU/mL   Magnesium    Specimen: Blood   Result Value Ref Range    Magnesium 2.0 1.6 - 2.6 mg/dL   CBC Auto Differential    Specimen: Blood   Result Value Ref Range    WBC 11.19 (H) 3.40 - 10.80 10*3/mm3    RBC 4.96 3.77 - 5.28 10*6/mm3    Hemoglobin 14.3 12.0 - 15.9 g/dL    Hematocrit 43.8 34.0 - 46.6 %    MCV 88.3 79.0 - 97.0 fL    MCH 28.8 26.6 - 33.0 pg    MCHC 32.6 31.5 - 35.7 g/dL    RDW 12.5 12.3 - 15.4 %    RDW-SD 40.4 37.0 - 54.0 fl    MPV 8.9 6.0 - 12.0 fL    Platelets 296 140 - 450 10*3/mm3    Neutrophil % 61.8 42.7 - 76.0 %    Lymphocyte % 28.6 19.6 - 45.3 %    Monocyte % 6.8 5.0 - 12.0 %    Eosinophil % 1.8 0.3 - 6.2 %     Basophil % 0.6 0.0 - 1.5 %    Immature Grans % 0.4 0.0 - 0.5 %    Neutrophils, Absolute 6.91 1.70 - 7.00 10*3/mm3    Lymphocytes, Absolute 3.20 (H) 0.70 - 3.10 10*3/mm3    Monocytes, Absolute 0.76 0.10 - 0.90 10*3/mm3    Eosinophils, Absolute 0.20 0.00 - 0.40 10*3/mm3    Basophils, Absolute 0.07 0.00 - 0.20 10*3/mm3    Immature Grans, Absolute 0.05 0.00 - 0.05 10*3/mm3    nRBC 0.0 0.0 - 0.2 /100 WBC   Green Top (Gel)   Result Value Ref Range    Extra Tube Hold for add-ons.    Lavender Top   Result Value Ref Range    Extra Tube hold for add-on    Gold Top - SST   Result Value Ref Range    Extra Tube Hold for add-ons.    Light Blue Top   Result Value Ref Range    Extra Tube hold for add-on                 ED Course  ED Course as of 10/20/21 1918   Wed Oct 20, 2021   1430 ECG 14:26 NSR, rate 80. Anterior infarct, age undetermined. QT/QTc 354/408 [ALCIDES]   1833 Case discussed with Dr. Jung.  He is admitting patient to the hospitalist service.  Patient resting comfortably, voices that she is currently asymptomatic.  She agrees to admission for further evaluation. [CM]      ED Course User Index  [CM] Donavan Lou MD  [ALCIDES] Fran Park MD                                         HEART Score (for prediction of 6-week risk of major adverse cardiac event) reviewed and/or performed as part of the patient evaluation and treatment planning process.  The result associated with this review/performance is: 5       MDM    Final diagnoses:   Chest pain, unspecified type       ED Disposition  ED Disposition     ED Disposition Condition Comment    Decision to Admit  Level of Care: Telemetry [5]   Diagnosis: Chest pain [[id number]]            Please note that portions of this note were completed with a voice recognition program.            Donavan Lou MD  10/20/21 1918

## 2021-10-20 NOTE — ED NOTES
Medical records requested from [facility] per provider     Irma Benson, PCT  10/20/21 2677

## 2021-10-21 ENCOUNTER — APPOINTMENT (OUTPATIENT)
Dept: NUCLEAR MEDICINE | Facility: HOSPITAL | Age: 46
End: 2021-10-21

## 2021-10-21 ENCOUNTER — APPOINTMENT (OUTPATIENT)
Dept: CARDIOLOGY | Facility: HOSPITAL | Age: 46
End: 2021-10-21

## 2021-10-21 VITALS
WEIGHT: 290.2 LBS | TEMPERATURE: 98 F | BODY MASS INDEX: 49.54 KG/M2 | RESPIRATION RATE: 16 BRPM | SYSTOLIC BLOOD PRESSURE: 102 MMHG | DIASTOLIC BLOOD PRESSURE: 74 MMHG | HEIGHT: 64 IN | OXYGEN SATURATION: 94 % | HEART RATE: 71 BPM

## 2021-10-21 LAB
ALBUMIN SERPL-MCNC: 3.86 G/DL (ref 3.5–5.2)
ALBUMIN/GLOB SERPL: 1.4 G/DL
ALP SERPL-CCNC: 63 U/L (ref 39–117)
ALT SERPL W P-5'-P-CCNC: 32 U/L (ref 1–33)
AMPHET+METHAMPHET UR QL: NEGATIVE
AMPHETAMINES UR QL: NEGATIVE
ANION GAP SERPL CALCULATED.3IONS-SCNC: 10.7 MMOL/L (ref 5–15)
AST SERPL-CCNC: 22 U/L (ref 1–32)
BARBITURATES UR QL SCN: NEGATIVE
BASOPHILS # BLD AUTO: 0.05 10*3/MM3 (ref 0–0.2)
BASOPHILS NFR BLD AUTO: 0.4 % (ref 0–1.5)
BENZODIAZ UR QL SCN: NEGATIVE
BH CV ECHO MEAS - ACS: 2 CM
BH CV ECHO MEAS - AO MAX PG: 11.8 MMHG
BH CV ECHO MEAS - AO MEAN PG: 7 MMHG
BH CV ECHO MEAS - AO ROOT AREA (BSA CORRECTED): 1.3
BH CV ECHO MEAS - AO ROOT AREA: 7.1 CM^2
BH CV ECHO MEAS - AO ROOT DIAM: 3 CM
BH CV ECHO MEAS - AO V2 MAX: 172 CM/SEC
BH CV ECHO MEAS - AO V2 MEAN: 130 CM/SEC
BH CV ECHO MEAS - AO V2 VTI: 38 CM
BH CV ECHO MEAS - BSA(HAYCOCK): 2.5 M^2
BH CV ECHO MEAS - BSA: 2.3 M^2
BH CV ECHO MEAS - BZI_BMI: 49.8 KILOGRAMS/M^2
BH CV ECHO MEAS - BZI_METRIC_HEIGHT: 162.6 CM
BH CV ECHO MEAS - BZI_METRIC_WEIGHT: 131.5 KG
BH CV ECHO MEAS - EDV(CUBED): 96.1 ML
BH CV ECHO MEAS - EDV(MOD-SP4): 52.3 ML
BH CV ECHO MEAS - EDV(TEICH): 96.3 ML
BH CV ECHO MEAS - EF(CUBED): 84.1 %
BH CV ECHO MEAS - EF(MOD-SP4): 67.3 %
BH CV ECHO MEAS - EF(TEICH): 77.3 %
BH CV ECHO MEAS - ESV(CUBED): 15.3 ML
BH CV ECHO MEAS - ESV(MOD-SP4): 17.1 ML
BH CV ECHO MEAS - ESV(TEICH): 21.9 ML
BH CV ECHO MEAS - FS: 45.9 %
BH CV ECHO MEAS - IVS/LVPW: 1
BH CV ECHO MEAS - IVSD: 0.97 CM
BH CV ECHO MEAS - LA DIMENSION: 3.3 CM
BH CV ECHO MEAS - LA/AO: 1.1
BH CV ECHO MEAS - LV DIASTOLIC VOL/BSA (35-75): 22.8 ML/M^2
BH CV ECHO MEAS - LV MASS(C)D: 147.9 GRAMS
BH CV ECHO MEAS - LV MASS(C)DI: 64.6 GRAMS/M^2
BH CV ECHO MEAS - LV SYSTOLIC VOL/BSA (12-30): 7.5 ML/M^2
BH CV ECHO MEAS - LVIDD: 4.6 CM
BH CV ECHO MEAS - LVIDS: 2.5 CM
BH CV ECHO MEAS - LVLD AP4: 7.3 CM
BH CV ECHO MEAS - LVLS AP4: 6.3 CM
BH CV ECHO MEAS - LVOT AREA (M): 3.1 CM^2
BH CV ECHO MEAS - LVOT AREA: 3.1 CM^2
BH CV ECHO MEAS - LVOT DIAM: 2 CM
BH CV ECHO MEAS - LVPWD: 0.93 CM
BH CV ECHO MEAS - MV A MAX VEL: 95.5 CM/SEC
BH CV ECHO MEAS - MV E MAX VEL: 99.4 CM/SEC
BH CV ECHO MEAS - MV E/A: 1
BH CV ECHO MEAS - PA ACC TIME: 0.12 SEC
BH CV ECHO MEAS - PA PR(ACCEL): 25 MMHG
BH CV ECHO MEAS - RAP SYSTOLE: 10 MMHG
BH CV ECHO MEAS - RVSP: 32.8 MMHG
BH CV ECHO MEAS - SI(AO): 117.3 ML/M^2
BH CV ECHO MEAS - SI(CUBED): 35.3 ML/M^2
BH CV ECHO MEAS - SI(MOD-SP4): 15.4 ML/M^2
BH CV ECHO MEAS - SI(TEICH): 32.5 ML/M^2
BH CV ECHO MEAS - SV(AO): 268.6 ML
BH CV ECHO MEAS - SV(CUBED): 80.8 ML
BH CV ECHO MEAS - SV(MOD-SP4): 35.2 ML
BH CV ECHO MEAS - SV(TEICH): 74.5 ML
BH CV ECHO MEAS - TR MAX VEL: 239 CM/SEC
BH CV NUCLEAR PRIOR STUDY: 3
BH CV REST NUCLEAR ISOTOPE DOSE: 10.4 MCI
BH CV STRESS BP STAGE 1: NORMAL
BH CV STRESS BP STAGE 2: NORMAL
BH CV STRESS COMMENTS STAGE 1: NORMAL
BH CV STRESS COMMENTS STAGE 2: NORMAL
BH CV STRESS DOSE REGADENOSON STAGE 1: 0.4
BH CV STRESS DURATION MIN STAGE 1: 3
BH CV STRESS DURATION MIN STAGE 2: 4
BH CV STRESS DURATION SEC STAGE 1: 0
BH CV STRESS DURATION SEC STAGE 2: 0
BH CV STRESS GRADE STAGE 1: 10
BH CV STRESS HR STAGE 1: 138
BH CV STRESS HR STAGE 2: 152
BH CV STRESS METS STAGE 1: 5
BH CV STRESS NUCLEAR ISOTOPE DOSE: 30.1 MCI
BH CV STRESS PROTOCOL 1: NORMAL
BH CV STRESS RECOVERY BP: NORMAL MMHG
BH CV STRESS RECOVERY HR: 92 BPM
BH CV STRESS SPEED STAGE 1: 1.7
BH CV STRESS STAGE 1: 1
BH CV STRESS STAGE 2: 2
BILIRUB SERPL-MCNC: 0.5 MG/DL (ref 0–1.2)
BUN SERPL-MCNC: 7 MG/DL (ref 6–20)
BUN/CREAT SERPL: 10 (ref 7–25)
BUPRENORPHINE SERPL-MCNC: NEGATIVE NG/ML
CALCIUM SPEC-SCNC: 9.3 MG/DL (ref 8.6–10.5)
CANNABINOIDS SERPL QL: NEGATIVE
CHLORIDE SERPL-SCNC: 106 MMOL/L (ref 98–107)
CHOLEST SERPL-MCNC: 141 MG/DL (ref 0–200)
CO2 SERPL-SCNC: 26.3 MMOL/L (ref 22–29)
COCAINE UR QL: NEGATIVE
CREAT SERPL-MCNC: 0.7 MG/DL (ref 0.57–1)
DEPRECATED RDW RBC AUTO: 40.9 FL (ref 37–54)
EOSINOPHIL # BLD AUTO: 0.18 10*3/MM3 (ref 0–0.4)
EOSINOPHIL NFR BLD AUTO: 1.6 % (ref 0.3–6.2)
ERYTHROCYTE [DISTWIDTH] IN BLOOD BY AUTOMATED COUNT: 12.7 % (ref 12.3–15.4)
GFR SERPL CREATININE-BSD FRML MDRD: 89 ML/MIN/1.73
GLOBULIN UR ELPH-MCNC: 2.8 GM/DL
GLUCOSE BLDC GLUCOMTR-MCNC: 107 MG/DL (ref 70–130)
GLUCOSE BLDC GLUCOMTR-MCNC: 125 MG/DL (ref 70–130)
GLUCOSE BLDC GLUCOMTR-MCNC: 129 MG/DL (ref 70–130)
GLUCOSE SERPL-MCNC: 132 MG/DL (ref 65–99)
HAV IGM SERPL QL IA: NORMAL
HBA1C MFR BLD: 7.2 % (ref 4.8–5.6)
HBV CORE IGM SERPL QL IA: NORMAL
HBV SURFACE AG SERPL QL IA: NORMAL
HCT VFR BLD AUTO: 39.5 % (ref 34–46.6)
HCV AB SER DONR QL: NORMAL
HDLC SERPL-MCNC: 43 MG/DL (ref 40–60)
HGB BLD-MCNC: 12.9 G/DL (ref 12–15.9)
IMM GRANULOCYTES # BLD AUTO: 0.04 10*3/MM3 (ref 0–0.05)
IMM GRANULOCYTES NFR BLD AUTO: 0.4 % (ref 0–0.5)
LDLC SERPL CALC-MCNC: 84 MG/DL (ref 0–100)
LDLC/HDLC SERPL: 1.97 {RATIO}
LV EF NUC BP: 56 %
LYMPHOCYTES # BLD AUTO: 3.71 10*3/MM3 (ref 0.7–3.1)
LYMPHOCYTES NFR BLD AUTO: 32.7 % (ref 19.6–45.3)
MAXIMAL PREDICTED HEART RATE: 172 BPM
MAXIMAL PREDICTED HEART RATE: 172 BPM
MCH RBC QN AUTO: 28.8 PG (ref 26.6–33)
MCHC RBC AUTO-ENTMCNC: 32.7 G/DL (ref 31.5–35.7)
MCV RBC AUTO: 88.2 FL (ref 79–97)
METHADONE UR QL SCN: NEGATIVE
MONOCYTES # BLD AUTO: 0.77 10*3/MM3 (ref 0.1–0.9)
MONOCYTES NFR BLD AUTO: 6.8 % (ref 5–12)
NEUTROPHILS NFR BLD AUTO: 58.1 % (ref 42.7–76)
NEUTROPHILS NFR BLD AUTO: 6.59 10*3/MM3 (ref 1.7–7)
NRBC BLD AUTO-RTO: 0 /100 WBC (ref 0–0.2)
OPIATES UR QL: NEGATIVE
OXYCODONE UR QL SCN: NEGATIVE
PCP UR QL SCN: NEGATIVE
PERCENT MAX PREDICTED HR: 88.37 %
PLATELET # BLD AUTO: 277 10*3/MM3 (ref 140–450)
PMV BLD AUTO: 9.4 FL (ref 6–12)
POTASSIUM SERPL-SCNC: 3.7 MMOL/L (ref 3.5–5.2)
PROPOXYPH UR QL: NEGATIVE
PROT SERPL-MCNC: 6.7 G/DL (ref 6–8.5)
RBC # BLD AUTO: 4.48 10*6/MM3 (ref 3.77–5.28)
SODIUM SERPL-SCNC: 143 MMOL/L (ref 136–145)
STRESS BASELINE BP: NORMAL MMHG
STRESS BASELINE HR: 82 BPM
STRESS PERCENT HR: 104 %
STRESS POST ESTIMATED WORKLOAD: 7 METS
STRESS POST EXERCISE DUR MIN: 4 MIN
STRESS POST EXERCISE DUR SEC: 10 SEC
STRESS POST PEAK BP: NORMAL MMHG
STRESS POST PEAK HR: 152 BPM
STRESS TARGET HR: 146 BPM
STRESS TARGET HR: 146 BPM
TRICYCLICS UR QL SCN: NEGATIVE
TRIGL SERPL-MCNC: 67 MG/DL (ref 0–150)
TROPONIN T SERPL-MCNC: <0.01 NG/ML (ref 0–0.03)
VLDLC SERPL-MCNC: 14 MG/DL (ref 5–40)
WBC # BLD AUTO: 11.34 10*3/MM3 (ref 3.4–10.8)

## 2021-10-21 PROCEDURE — G0378 HOSPITAL OBSERVATION PER HR: HCPCS

## 2021-10-21 PROCEDURE — 93306 TTE W/DOPPLER COMPLETE: CPT

## 2021-10-21 PROCEDURE — 94660 CPAP INITIATION&MGMT: CPT

## 2021-10-21 PROCEDURE — 93018 CV STRESS TEST I&R ONLY: CPT | Performed by: INTERNAL MEDICINE

## 2021-10-21 PROCEDURE — 80061 LIPID PANEL: CPT | Performed by: INTERNAL MEDICINE

## 2021-10-21 PROCEDURE — 78452 HT MUSCLE IMAGE SPECT MULT: CPT | Performed by: INTERNAL MEDICINE

## 2021-10-21 PROCEDURE — 94799 UNLISTED PULMONARY SVC/PX: CPT

## 2021-10-21 PROCEDURE — 25010000002 HEPARIN (PORCINE) PER 1000 UNITS: Performed by: INTERNAL MEDICINE

## 2021-10-21 PROCEDURE — 0 TECHNETIUM SESTAMIBI: Performed by: INTERNAL MEDICINE

## 2021-10-21 PROCEDURE — 84484 ASSAY OF TROPONIN QUANT: CPT | Performed by: INTERNAL MEDICINE

## 2021-10-21 PROCEDURE — 99217 PR OBSERVATION CARE DISCHARGE MANAGEMENT: CPT | Performed by: INTERNAL MEDICINE

## 2021-10-21 PROCEDURE — 96372 THER/PROPH/DIAG INJ SC/IM: CPT

## 2021-10-21 PROCEDURE — 93017 CV STRESS TEST TRACING ONLY: CPT

## 2021-10-21 PROCEDURE — 80074 ACUTE HEPATITIS PANEL: CPT | Performed by: PHYSICIAN ASSISTANT

## 2021-10-21 PROCEDURE — 83036 HEMOGLOBIN GLYCOSYLATED A1C: CPT | Performed by: INTERNAL MEDICINE

## 2021-10-21 PROCEDURE — 80053 COMPREHEN METABOLIC PANEL: CPT | Performed by: PHYSICIAN ASSISTANT

## 2021-10-21 PROCEDURE — 85025 COMPLETE CBC W/AUTO DIFF WBC: CPT | Performed by: PHYSICIAN ASSISTANT

## 2021-10-21 PROCEDURE — 82962 GLUCOSE BLOOD TEST: CPT

## 2021-10-21 PROCEDURE — A9500 TC99M SESTAMIBI: HCPCS | Performed by: INTERNAL MEDICINE

## 2021-10-21 PROCEDURE — 78452 HT MUSCLE IMAGE SPECT MULT: CPT

## 2021-10-21 PROCEDURE — 80306 DRUG TEST PRSMV INSTRMNT: CPT | Performed by: PHYSICIAN ASSISTANT

## 2021-10-21 PROCEDURE — 93306 TTE W/DOPPLER COMPLETE: CPT | Performed by: INTERNAL MEDICINE

## 2021-10-21 RX ORDER — METOPROLOL SUCCINATE 50 MG/1
50 TABLET, EXTENDED RELEASE ORAL NIGHTLY
Status: DISCONTINUED | OUTPATIENT
Start: 2021-10-21 | End: 2021-10-21 | Stop reason: HOSPADM

## 2021-10-21 RX ORDER — CHOLECALCIFEROL (VITAMIN D3) 125 MCG
5 CAPSULE ORAL NIGHTLY PRN
Status: DISCONTINUED | OUTPATIENT
Start: 2021-10-21 | End: 2021-10-21 | Stop reason: HOSPADM

## 2021-10-21 RX ORDER — PANTOPRAZOLE SODIUM 40 MG/1
40 TABLET, DELAYED RELEASE ORAL EVERY MORNING
Status: CANCELLED | OUTPATIENT
Start: 2021-10-21

## 2021-10-21 RX ORDER — ACETAMINOPHEN 325 MG/1
650 TABLET ORAL EVERY 6 HOURS PRN
Status: DISCONTINUED | OUTPATIENT
Start: 2021-10-21 | End: 2021-10-21 | Stop reason: HOSPADM

## 2021-10-21 RX ORDER — HYDROXYZINE HYDROCHLORIDE 25 MG/1
25 TABLET, FILM COATED ORAL 3 TIMES DAILY PRN
Qty: 21 TABLET | Refills: 0 | Status: SHIPPED | OUTPATIENT
Start: 2021-10-21 | End: 2021-10-28

## 2021-10-21 RX ORDER — OMEGA-3S/DHA/EPA/FISH OIL/D3 300MG-1000
1000 CAPSULE ORAL DAILY
Status: DISCONTINUED | OUTPATIENT
Start: 2021-10-21 | End: 2021-10-21 | Stop reason: HOSPADM

## 2021-10-21 RX ORDER — LEVOTHYROXINE SODIUM 0.07 MG/1
75 TABLET ORAL DAILY
Status: DISCONTINUED | OUTPATIENT
Start: 2021-10-21 | End: 2021-10-21 | Stop reason: HOSPADM

## 2021-10-21 RX ORDER — PANTOPRAZOLE SODIUM 40 MG/1
40 TABLET, DELAYED RELEASE ORAL
Status: DISCONTINUED | OUTPATIENT
Start: 2021-10-21 | End: 2021-10-21 | Stop reason: HOSPADM

## 2021-10-21 RX ORDER — CALCIUM CARBONATE 200(500)MG
2 TABLET,CHEWABLE ORAL 3 TIMES DAILY PRN
Status: DISCONTINUED | OUTPATIENT
Start: 2021-10-21 | End: 2021-10-21 | Stop reason: HOSPADM

## 2021-10-21 RX ORDER — LISINOPRIL 10 MG/1
20 TABLET ORAL DAILY
Status: DISCONTINUED | OUTPATIENT
Start: 2021-10-21 | End: 2021-10-21 | Stop reason: HOSPADM

## 2021-10-21 RX ADMIN — Medication 5 MG: at 00:16

## 2021-10-21 RX ADMIN — LISINOPRIL 20 MG: 10 TABLET ORAL at 13:35

## 2021-10-21 RX ADMIN — Medication 10 ML: at 00:01

## 2021-10-21 RX ADMIN — TECHNETIUM TC 99M SESTAMIBI 1 DOSE: 1 INJECTION INTRAVENOUS at 09:00

## 2021-10-21 RX ADMIN — BUSPIRONE HYDROCHLORIDE 15 MG: 10 TABLET ORAL at 13:35

## 2021-10-21 RX ADMIN — HEPARIN SODIUM 5000 UNITS: 5000 INJECTION INTRAVENOUS; SUBCUTANEOUS at 13:35

## 2021-10-21 RX ADMIN — LEVOTHYROXINE SODIUM 75 MCG: 0.07 TABLET ORAL at 13:35

## 2021-10-21 RX ADMIN — TECHNETIUM TC 99M SESTAMIBI 1 DOSE: 1 INJECTION INTRAVENOUS at 11:50

## 2021-10-21 RX ADMIN — CHOLECALCIFEROL TAB 10 MCG (400 UNIT) 1000 UNITS: 10 TAB at 13:35

## 2021-10-21 RX ADMIN — ASPIRIN 81 MG: 81 TABLET, COATED ORAL at 13:37

## 2021-10-21 RX ADMIN — HEPARIN SODIUM 5000 UNITS: 5000 INJECTION INTRAVENOUS; SUBCUTANEOUS at 06:29

## 2021-10-21 RX ADMIN — Medication 10 ML: at 07:30

## 2021-10-21 RX ADMIN — ACETAMINOPHEN 650 MG: 325 TABLET ORAL at 02:41

## 2021-10-21 NOTE — PLAN OF CARE
Patient arrived on the unit from ER, with no complaints of chest pain at this time, although she does have a migraine,  PRN tylenol given. SU LUA ordered pt a CPAP to wear at night because of her sleep apnea. Patient has no further complaints at this time. Will continue to monitor and follow care plan.

## 2021-10-21 NOTE — PAYOR COMM NOTE
"    [address] [address]  Phone: [phone]  NPI: [id number]     Tax ID: [id number]      Roz Bejarano Rn  Utilization Review  Phone: [phone]  Fax:      [phone]  E mail: [email or web address]    Initiate observation authorization    Jose Davis (46 y.o. Female)             Date of Birth Social Security Number Address Home Phone MRN    1975  [address] [facility] [address] [phone] [id number]    Bahai Marital Status             Rastafari        Admission Date Admission Type Admitting Provider Attending Provider Department, Room/Bed    10/20/21 Emergency Newton Jung MD Hacker, Bill J, MD [facility] 3 [facility], 3314/2S    Discharge Date Discharge Disposition Discharge Destination                         Attending Provider: Newton Jung MD    Allergies: Codeine, Prozac [Fluoxetine Hcl]    Isolation: None   Infection: COVID (rule out) (10/20/21)   Code Status: CPR   Advance Care Planning Activity    Ht: 162.6 cm (64\")   Wt: 132 kg (290 lb 3.2 oz)    Admission Cmt: None   Principal Problem: Chest pain [R07.9]                 Active Insurance as of 10/20/2021     Primary Coverage     Payor Plan Insurance Group Employer/Plan Group    [facility] COMMUNITY PLAN [facility] COMMUNITY PLAN [facility] [id number]     Payor Plan Address Payor Plan Phone Number Payor Plan Fax Number Effective Dates    [address]   7/1/2021 - None Entered    [facility] [id number]       Subscriber Name Subscriber Birth Date Member ID       JOSE DAVIS 1975 [id number]                 Emergency Contacts      (Rel.) Home Phone Work Phone Mobile Phone    MICHELLE ALLEN (Son) [phone] -- --    Linda Chapman (Friend) [phone] -- --               History & Physical      MylesAlona PA-C at 10/20/21 3174     Attestation signed by Whitney Wayne MD at 10/21/21 3261    I have reviewed this " documentation and agree.  I have discussed the assessment and plan with BATOOL Cline.                         [facility] Medicine Services  HISTORY & PHYSICAL    Patient Identification:  Name:  Arabella Rose  Age:  46 y.o.  Sex:  female  :  1975  MRN:  [id number]   Visit Number:  [id number]  Admit Date: 10/20/2021   Primary Care Physician:  Linda Thurston MD     Subjective     Chief complaint:   Chief Complaint   Patient presents with   • Chest Pain     History of presenting illness:   Patient is a 46 y.o. female with past medical history significant for history of PE, essential hypertension, hypothyroidism, that presented to the [facility] emergency department for evaluation of chest pain.     The patient reports that she has been experiencing intermittent chest pain for the past 3 days.  She went to [facility] emergency department on Monday (10/18/2021) for chest pain and was told it was likely due to anxiety.  She reports that her work-up while at [facility] was unremarkable.  She states that over the past few days her pain has not worsened but remained the same.  She admits that the chest pain worsens when she feels anxious and describes it as a diffuse burning sensation across her chest that radiates to her bilateral shoulders and into her back.  She also admits to occasional shortness of breath, nausea, and pressure associated with the chest discomfort but denies any emesis.  She further reports that the pain actually gets better with ambulation.  She admits to having a stress test over 10 years ago that revealed no evidence of ischemia.  She denies any personal cardiac history and believes that her father has coronary artery disease.  As stated above, she admits to increased stressors at home as she currently takes care of her mother and has 11-year-old daughter.  She also admits to having a another daughter who passed away in  due to a pediatric  heart abnormality.  She denies any thoughts of hurting herself or others.  She is currently taking BuSpar for her anxiety which does improve her nervousness.  She denies any recent illness, fever, chills, congestion, sore throat, coughing, wheezing, leg edema, palpitations, abdominal pain, diarrhea, dysuria, wounds, dizziness, lightheadedness, syncope.  She also denies any smoking/smokeless tobacco use or e-cigarette use.  She also denies any illicit drug or alcohol use.    Upon arrival to the ED, vitals were temperature 98.8F, pulse 89, RR 16, /85, SpO2 97% RA. Troponin T negative x 2. CMP with glucose 156, ALT 38. CBC with WBC 11.19, otherwise unremarkable. COVID 19 negative. CXR with no acute cardiopulmonary findings. EKG with NSR, possible left atrial enlargement, HR 80, QTc 408 ms.     In the ED the patient received  mg, Nitrostat 0.5 inch, IV Zofran 4 mg.     Patient has been admitted to the telemetry floor as an observation student for further evaluation and treatment.   ---------------------------------------------------------------------------------------------------------------------   Review of Systems   Constitutional: Negative for appetite change, chills, diaphoresis, fatigue and fever.   HENT: Negative for congestion, sinus pain and sore throat.    Eyes: Negative for photophobia and visual disturbance.   Respiratory: Positive for shortness of breath. Negative for cough, chest tightness and wheezing.    Cardiovascular: Positive for chest pain (Diffuse burning across chest with radiation to bilateral shoulders and into the back). Negative for palpitations and leg swelling.   Gastrointestinal: Positive for nausea. Negative for abdominal pain, constipation, diarrhea and vomiting.   Endocrine: Negative for cold intolerance and heat intolerance.   Genitourinary: Negative for decreased urine volume, dysuria and frequency.   Musculoskeletal: Negative for arthralgias and myalgias.   Skin: Negative  for rash and wound.   Allergic/Immunologic: Negative for environmental allergies and immunocompromised state.   Neurological: Negative for dizziness, syncope, weakness, light-headedness and headaches.   Psychiatric/Behavioral: Negative for confusion, self-injury and suicidal ideas. The patient is nervous/anxious.       ---------------------------------------------------------------------------------------------------------------------   Past Medical History:   Diagnosis Date   • Anxiety    • Depression    • History of pulmonary embolus (PE)    • Hypertension    • Hypothyroidism      Past Surgical History:   Procedure Laterality Date   • CHOLECYSTECTOMY     • EYE SURGERY     • PULMONARY EMBOLISM SURGERY       History reviewed. No pertinent family history.  Social History     Socioeconomic History   • Marital status:    Tobacco Use   • Smoking status: Never Smoker   Substance and Sexual Activity   • Alcohol use: No   • Drug use: No     ---------------------------------------------------------------------------------------------------------------------   Allergies:  Codeine and Prozac [fluoxetine hcl]  ---------------------------------------------------------------------------------------------------------------------   Medications below are reported home medications pulling from within the system; at this time, these medications have not been reconciled unless otherwise specified and are in the verification process for further verifcation as current home medications.    Prior to Admission Medications     Prescriptions Last Dose Informant Patient Reported? Taking?    levothyroxine (SYNTHROID, LEVOTHROID) 75 MCG tablet   Yes No    Take 75 mcg by mouth Daily.    lisinopril (PRINIVIL,ZESTRIL) 20 MG tablet   Yes No    Take 20 mg by mouth Daily.    metoprolol succinate XL (TOPROL-XL) 50 MG 24 hr tablet   Yes No    Take 50 mg by mouth Daily.    omeprazole (priLOSEC) 20 MG capsule   Yes No    Take 20 mg by mouth Daily.         ---------------------------------------------------------------------------------------------------------------------    Objective     Hospital Scheduled Meds:  Morphine, 2 mg, Intravenous, Once           Current listed hospital scheduled medications may not yet reflect those currently placed in orders that are signed and held, awaiting patient's arrival to floor/unit.    ---------------------------------------------------------------------------------------------------------------------   Vital Signs:  Temp:  [98.8 °F (37.1 °C)] 98.8 °F (37.1 °C)  Heart Rate:  [64-90] 80  Resp:  [16] 16  BP: (103-161)/(46-90) 133/80  Mean Arterial Pressure (Non-Invasive) for the past 24 hrs (Last 3 readings):   Noninvasive MAP (mmHg)   10/20/21 2033 95   10/20/21 2017 103   10/20/21 2002 95     SpO2 Percentage    10/20/21 2017 10/20/21 2033 10/20/21 2042   SpO2: 97% 98% 98%     SpO2:  [92 %-100 %] 98 %  on   ;        Body mass index is 48.92 kg/m².  Wt Readings from Last 3 Encounters:   10/20/21 129 kg (285 lb)   06/29/19 133 kg (294 lb)   03/21/17 126 kg (278 lb)     ---------------------------------------------------------------------------------------------------------------------   Physical Exam:  Physical Exam  Constitutional:       General: She is awake.      Appearance: Normal appearance. She is morbidly obese.   HENT:      Head: Normocephalic and atraumatic.   Eyes:      General: Lids are normal.         Right eye: No discharge.         Left eye: No discharge.   Cardiovascular:      Rate and Rhythm: Normal rate and regular rhythm.      Pulses: Normal pulses. No decreased pulses.           Dorsalis pedis pulses are 2+ on the right side and 2+ on the left side.        Posterior tibial pulses are 2+ on the right side and 2+ on the left side.      Heart sounds: Normal heart sounds. No murmur heard.  No friction rub. No gallop.    Pulmonary:      Effort: Pulmonary effort is normal. No tachypnea or bradypnea.      Breath  sounds: Normal breath sounds. No decreased breath sounds, wheezing, rhonchi or rales.   Chest:      Chest wall: No tenderness.   Abdominal:      General: Bowel sounds are normal.      Palpations: Abdomen is soft.      Tenderness: There is no abdominal tenderness.   Musculoskeletal:      Right lower leg: No edema.      Left lower leg: No edema.   Skin:     Findings: No abrasion, ecchymosis or erythema.   Neurological:      General: No focal deficit present.      Mental Status: She is alert and oriented to person, place, and time. Mental status is at baseline.   Psychiatric:         Mood and Affect: Mood is anxious. Mood is not depressed. Affect is not tearful.         Behavior: Behavior is cooperative.         Thought Content: Thought content does not include homicidal or suicidal ideation. Thought content does not include homicidal or suicidal plan.         Cognition and Memory: Cognition normal.       ---------------------------------------------------------------------------------------------------------------------  EKG:  Pending cardiology read. Per my evaluation, NSR with possible left atrial enlargement, HR 80 bpm, QTc 408 ms.     Telemetry:    NSR, HR 77, SpO2 97% on RA.     I have personally reviewed the EKG/Telemetry strip  ---------------------------------------------------------------------------------------------------------------------   Results from last 7 days   Lab Units 10/20/21  1656 10/20/21  1500   TROPONIN T ng/mL <0.010 <0.010     Results from last 7 days   Lab Units 10/20/21  1500   PROBNP pg/mL 201.5         Results from last 7 days   Lab Units 10/20/21  1500   WBC 10*3/mm3 11.19*   HEMOGLOBIN g/dL 14.3   HEMATOCRIT % 43.8   MCV fL 88.3   MCHC g/dL 32.6   PLATELETS 10*3/mm3 296     Results from last 7 days   Lab Units 10/20/21  1500   SODIUM mmol/L 139   POTASSIUM mmol/L 4.0   MAGNESIUM mg/dL 2.0   CHLORIDE mmol/L 104   CO2 mmol/L 26.7   BUN mg/dL 9   CREATININE mg/dL 0.84   EGFR IF NONAFRICN  AM mL/min/1.73 72   CALCIUM mg/dL 9.4   GLUCOSE mg/dL 156*   ALBUMIN g/dL 4.21   BILIRUBIN mg/dL 0.5   ALK PHOS U/L 77   AST (SGOT) U/L 27   ALT (SGPT) U/L 38*   Estimated Creatinine Clearance: 109.1 mL/min (by C-G formula based on SCr of 0.84 mg/dL).    Lab Results   Component Value Date    TSH 2.750 10/20/2021     Pain Management Panel     Pain Management Panel Latest Ref Rng & Units 2/27/2017    AMPHETAMINES SCREEN, URINE Negative Negative    BARBITURATES SCREEN Negative Negative    BENZODIAZEPINE SCREEN, URINE Negative Negative    COCAINE SCREEN, URINE Negative Negative    METHADONE SCREEN, URINE Negative Negative        I have personally reviewed the above laboratory results.   ---------------------------------------------------------------------------------------------------------------------  Imaging Results (Last 7 Days)     Procedure Component Value Units Date/Time    XR Chest 1 View [[id number]] Collected: 10/20/21 1552     Updated: 10/20/21 1603    Narrative:      XR CHEST 1 VW-     CLINICAL INDICATION: cp        COMPARISON: 06/29/2019      TECHNIQUE: Single frontal view of the chest.     FINDINGS:     There is no focal alveolar infiltrate or effusion.  The cardiac silhouette is normal. The pulmonary vasculature is  unremarkable.  There is no evidence of an acute osseous abnormality.   There are no suspicious-appearing parenchymal soft tissue nodules.          Impression:      No evidence of active or acute cardiopulmonary disease on today's chest  radiograph.     This report was finalized on 10/20/2021 3:52 PM by Dr. Musa Henriquez MD.           I have personally reviewed the above radiology results.     ---------------------------------------------------------------------------------------------------------------------    Assessment & Plan      Active Hospital Problems    Diagnosis  POA   • **Chest pain [R07.9]  Yes     #Chest pain POA with mixed features   -Currently chest pain free  -Troponin T negative  x 2; continue to trend  -EKG without acute ischemic changes; serial EKGs ordered  -Obtain fasting AM lipid panel, TTE, UDS  -NPO after midnight in anticipation for stress test in AM  -Received loading does ASA in ED; continue with daily ASA  -Monitor closely on telemetry     #Mildly elevated ALT   -ALT 38  -Will obtain acute hepatitis panel    #History of pulmonary emboli  -Based off of current home medication list, does not appear to be on any chronic anticoagulation   -SQ heparin for VTE prophylaxis     #Type II diabetes mellitus   -Obtain hemoglobin A1c   -Hold home oral hypoglycemics to prevent hypoglycemia  -Will add SSI for now. Accu-cheks qAC and qhs. Titrate insulin therapy as necessary.      #Hypothyroidism   -TSH is within normal limits   -Plan to continue home Synthroid     #Essential hypertension   -Review home medications once available   -Plan to resume home antihypertensive agents with appropriate holding parameters once reconciled per pharmacy     #Anxiety  #Depression   -Continue home Buspar  -Atarax available PRN     #Morbid obesity  -BMI 48.92 kg/m2   -Complicates all aspects of patient care     F/E/N: No IV fluids. Replace electrolytes as necessary. Consistent carb diet, NPO after midnight   ---------------------------------------------------  DVT Prophylaxis: Subcutaneous heparin   GI Prophylaxis: Protonix   Activity: Up with assistance, fall precautions     OBSERVATION status, however if further evaluation or treatment plans warrant, status may change.  Based upon current information, I predict patient's care encounter to be less than or equal to 2 midnights.    Code Status: FULL CODE     Disposition/Discharge planning: Plan for home at discharge. Pending clinical course.     I have discussed the patient's assessment and plan with the patient, nursing staff, and attending physician       Alona Bueno PA-C  Hospitalist Service -- [facility]       10/20/21  21:04  "EDT    Attending Physician: Whitney Wayne MD       Electronically signed by Whitney Wayne MD at 10/21/21 5311          Emergency Department Notes      Irma Benson PCT at 10/20/21 1441        Medical records requested from [facility] per provider     Irma Benson PCT  10/20/21 1441      Electronically signed by Irma Benson PCT at 10/20/21 1441     Donavan Lou MD at 10/20/21 1453          Subjective   Patient is 46-year-old female who presents with a chief complaint chest pain.  She states that she awoke yesterday morning with substernal pain that she characterizes mostly as \"burning\", but also as \"pressure\".  She states that this radiates to her shoulders and to her upper back.  She reports associated nausea with dry heaves.  Mild shortness of breath.  No diaphoresis, syncope or near syncope, other symptoms or other complaints.  She was seen at [facility] last night, she gave permission for me to obtain records.  She had a negative venous Doppler of both lower extremities.  She had a negative CT chest PE protocol.  I do not see EKGs or troponins paperwork.    Patient does report a history of PE years ago, states that she took Coumadin for about 9 months and is no longer chronically anticoagulated.  Under her past surgical history in the epic system is \"pulmonary embolism surgery\"; patient states that is not the case, she did not have any sort of procedure related to her PE.  She denies any known history of coronary artery disease.  She does report diabetes and hypertension, denies dyslipidemia.  She denies any known coronary artery disease in her parents or siblings.  She states that an aunt  of \"heart failure\" at an advanced age.  Patient states that she does not smoke or drink alcohol.          Review of Systems   All other systems reviewed and are negative.      Past Medical History:   Diagnosis Date   • Anxiety    • Depression    • Disease " of thyroid gland    • Hypertension    • Pulmonary embolism (HCC)        Allergies   Allergen Reactions   • Codeine    • Prozac [Fluoxetine Hcl]        Past Surgical History:   Procedure Laterality Date   • CHOLECYSTECTOMY     • EYE SURGERY     • PULMONARY EMBOLISM SURGERY         History reviewed. No pertinent family history.    Social History     Socioeconomic History   • Marital status:    Tobacco Use   • Smoking status: Never Smoker   Substance and Sexual Activity   • Alcohol use: No   • Drug use: No           Objective   Physical Exam  Vitals and nursing note reviewed.   Constitutional:       General: She is not in acute distress.     Appearance: Normal appearance. She is well-developed. She is not toxic-appearing or diaphoretic.      Comments: Patient appears somewhat anxious, not otherwise in apparent distress.   HENT:      Head: Normocephalic and atraumatic.   Eyes:      General: No scleral icterus.     Pupils: Pupils are equal, round, and reactive to light.   Neck:      Trachea: No tracheal deviation.   Cardiovascular:      Rate and Rhythm: Normal rate and regular rhythm.   Pulmonary:      Effort: Pulmonary effort is normal. No respiratory distress.      Breath sounds: Normal breath sounds.   Chest:      Chest wall: No tenderness.   Abdominal:      General: Bowel sounds are normal.      Palpations: Abdomen is soft.      Tenderness: There is no abdominal tenderness. There is no guarding or rebound.   Musculoskeletal:         General: No tenderness. Normal range of motion.      Cervical back: Normal range of motion and neck supple. No rigidity or tenderness.      Right lower leg: No tenderness. No edema.      Left lower leg: No tenderness. No edema.   Skin:     General: Skin is warm and dry.      Capillary Refill: Capillary refill takes less than 2 seconds.      Coloration: Skin is not pale.   Neurological:      General: No focal deficit present.      Mental Status: She is alert and oriented to person,  place, and time.      GCS: GCS eye subscore is 4. GCS verbal subscore is 5. GCS motor subscore is 6.      Motor: No abnormal muscle tone.   Psychiatric:         Mood and Affect: Mood is anxious.         Behavior: Behavior normal.         Procedures  EKG at 1426 shows sinus rhythm, rate 80.  We are interval 150, QRS duration 78, QTc 408 ms.  Nonspecific ST-T abnormality.  No evidence for STEMI.  XR Chest 1 View   Final Result   No evidence of active or acute cardiopulmonary disease on today's chest   radiograph.       This report was finalized on 10/20/2021 3:52 PM by Dr. Musa Henriquez MD.            Results for orders placed or performed during the hospital encounter of 10/20/21   Comprehensive Metabolic Panel    Specimen: Blood   Result Value Ref Range    Glucose 156 (H) 65 - 99 mg/dL    BUN 9 6 - 20 mg/dL    Creatinine 0.84 0.57 - 1.00 mg/dL    Sodium 139 136 - 145 mmol/L    Potassium 4.0 3.5 - 5.2 mmol/L    Chloride 104 98 - 107 mmol/L    CO2 26.7 22.0 - 29.0 mmol/L    Calcium 9.4 8.6 - 10.5 mg/dL    Total Protein 7.7 6.0 - 8.5 g/dL    Albumin 4.21 3.50 - 5.20 g/dL    ALT (SGPT) 38 (H) 1 - 33 U/L    AST (SGOT) 27 1 - 32 U/L    Alkaline Phosphatase 77 39 - 117 U/L    Total Bilirubin 0.5 0.0 - 1.2 mg/dL    eGFR Non African Amer 72 >60 mL/min/1.73    Globulin 3.5 gm/dL    A/G Ratio 1.2 g/dL    BUN/Creatinine Ratio 10.7 7.0 - 25.0    Anion Gap 8.3 5.0 - 15.0 mmol/L   BNP    Specimen: Blood   Result Value Ref Range    proBNP 201.5 0.0 - 450.0 pg/mL   Troponin    Specimen: Blood   Result Value Ref Range    Troponin T <0.010 0.000 - 0.030 ng/mL   Troponin    Specimen: Arm, Left; Blood   Result Value Ref Range    Troponin T <0.010 0.000 - 0.030 ng/mL   TSH    Specimen: Blood   Result Value Ref Range    TSH 2.750 0.270 - 4.200 uIU/mL   Magnesium    Specimen: Blood   Result Value Ref Range    Magnesium 2.0 1.6 - 2.6 mg/dL   CBC Auto Differential    Specimen: Blood   Result Value Ref Range    WBC 11.19 (H) 3.40 - 10.80  10*3/mm3    RBC 4.96 3.77 - 5.28 10*6/mm3    Hemoglobin 14.3 12.0 - 15.9 g/dL    Hematocrit 43.8 34.0 - 46.6 %    MCV 88.3 79.0 - 97.0 fL    MCH 28.8 26.6 - 33.0 pg    MCHC 32.6 31.5 - 35.7 g/dL    RDW 12.5 12.3 - 15.4 %    RDW-SD 40.4 37.0 - 54.0 fl    MPV 8.9 6.0 - 12.0 fL    Platelets 296 140 - 450 10*3/mm3    Neutrophil % 61.8 42.7 - 76.0 %    Lymphocyte % 28.6 19.6 - 45.3 %    Monocyte % 6.8 5.0 - 12.0 %    Eosinophil % 1.8 0.3 - 6.2 %    Basophil % 0.6 0.0 - 1.5 %    Immature Grans % 0.4 0.0 - 0.5 %    Neutrophils, Absolute 6.91 1.70 - 7.00 10*3/mm3    Lymphocytes, Absolute 3.20 (H) 0.70 - 3.10 10*3/mm3    Monocytes, Absolute 0.76 0.10 - 0.90 10*3/mm3    Eosinophils, Absolute 0.20 0.00 - 0.40 10*3/mm3    Basophils, Absolute 0.07 0.00 - 0.20 10*3/mm3    Immature Grans, Absolute 0.05 0.00 - 0.05 10*3/mm3    nRBC 0.0 0.0 - 0.2 /100 WBC   Green Top (Gel)   Result Value Ref Range    Extra Tube Hold for add-ons.    Lavender Top   Result Value Ref Range    Extra Tube hold for add-on    Gold Top - SST   Result Value Ref Range    Extra Tube Hold for add-ons.    Light Blue Top   Result Value Ref Range    Extra Tube hold for add-on                ED Course  ED Course as of 10/20/21 1918   Wed Oct 20, 2021   1430 ECG 14:26 NSR, rate 80. Anterior infarct, age undetermined. QT/QTc 354/408 [ALCIDES]   0703 Case discussed with Dr. Jung.  He is admitting patient to the hospitalist service.  Patient resting comfortably, voices that she is currently asymptomatic.  She agrees to admission for further evaluation. [CM]      ED Course User Index  [CM] Donavan Lou MD  [ALCIDES] Fran Park MD                                         HEART Score (for prediction of 6-week risk of major adverse cardiac event) reviewed and/or performed as part of the patient evaluation and treatment planning process.  The result associated with this review/performance is: 5       MDM    Final diagnoses:   Chest pain, unspecified type       ED  Disposition  ED Disposition     ED Disposition Condition Comment    Decision to Admit  Level of Care: Telemetry [5]   Diagnosis: Chest pain [741333]            Please note that portions of this note were completed with a voice recognition program.            Donavan Lou MD  10/20/21 1918      Electronically signed by Donavan Lou MD at 10/20/21 1918         Facility-Administered Medications as of 10/21/2021   Medication Dose Route Frequency Provider Last Rate Last Admin   • acetaminophen (TYLENOL) tablet 650 mg  650 mg Oral Q6H PRN Alona Bueno PA-C   650 mg at 10/21/21 0241   • [COMPLETED] aspirin chewable tablet 324 mg  324 mg Oral Once Donavan Lou MD   324 mg at 10/20/21 1507   • aspirin EC tablet 81 mg  81 mg Oral Daily Newton Jung MD       • atorvastatin (LIPITOR) tablet 40 mg  40 mg Oral Nightly Newton Jung MD   40 mg at 10/20/21 2357   • busPIRone (BUSPAR) tablet 15 mg  15 mg Oral Q12H Alona Bueno PA-C   15 mg at 10/20/21 2357   • calcium carbonate (TUMS) chewable tablet 500 mg (200 mg elemental)  2 tablet Oral TID PRN Alona Bueno PA-C       • dextrose (D50W) (25 g/50 mL) IV injection 25 g  25 g Intravenous Q15 Min PRN Newton Jung MD       • dextrose (GLUTOSE) oral gel 15 g  15 g Oral Q15 Min PRN Newton Jung MD       • glucagon (human recombinant) (GLUCAGEN DIAGNOSTIC) injection 1 mg  1 mg Subcutaneous Q15 Min PRN Newton Jung MD       • heparin (porcine) 5000 UNIT/ML injection 5,000 Units  5,000 Units Subcutaneous Q8H Newton Jung MD   5,000 Units at 10/21/21 0629   • hydrOXYzine (ATARAX) tablet 25 mg  25 mg Oral TID PRN Alona Bueno PA-C   25 mg at 10/20/21 2357   • insulin aspart (novoLOG) injection 0-9 Units  0-9 Units Subcutaneous TID AC Newton Jung MD       • melatonin tablet 5 mg  5 mg Oral Nightly PRN Alona Bueno PA-C   5 mg at 10/21/21 0016   • morphine injection 2 mg  2 mg Intravenous Once Carmine,  Donavan Castro MD       • [COMPLETED] nitroglycerin (NITROSTAT) ointment 0.5 inch  0.5 inch Topical Once Donavan Lou MD   0.5 inch at 10/20/21 1507   • nitroglycerin (NITROSTAT) SL tablet 0.4 mg  0.4 mg Sublingual Q5 Min PRN Newton Jung MD       • [COMPLETED] ondansetron (ZOFRAN) injection 4 mg  4 mg Intravenous Once Donavan Lou MD   4 mg at 10/20/21 1507   • pantoprazole (PROTONIX) EC tablet 40 mg  40 mg Oral Q AM Alona Bueno PA-C       • sodium chloride 0.9 % flush 10 mL  10 mL Intravenous PRN Donavan Lou MD       • sodium chloride 0.9 % flush 10 mL  10 mL Intravenous Q12H Newton Jung MD   10 mL at 10/21/21 0001   • sodium chloride 0.9 % flush 10 mL  10 mL Intravenous PRN Newton Jung MD

## 2021-10-21 NOTE — DISCHARGE INSTRUCTIONS
Please take medications as prescribed. Please go to follow-up appointments as recommended. Please seek medical attention if any worsening or changed chest pain.     Please continue social distancing and isolation efforts as recommended by CDC and [facility] to help prevent spread of COVID-19.

## 2021-10-21 NOTE — DISCHARGE SUMMARY
[facility] DISCHARGE SUMMARY    Patient Identification:  Name:  Arabella Rose  Age:  46 y.o.  Sex:  female  :  1975  MRN:  [id number]  Visit Number:  [id number]    Date of Admission: 10/20/2021  Date of Discharge:  10/21/2021    PCP: Linda Thurston MD    DISCHARGE DIAGNOSIS  #Noncardiac chest pain   #Mildly elevated ALT  #Hx of PE  #DM II  #Hypothyroidism   #HTN  #Anxiety   #Depression   #Morbid obesity     CONSULTS   None     PROCEDURES PERFORMED  Stress test:  · Stress Procedure  · A stress test was performed following the Wyatt protocol.  · Exercise duration (min) 4 min Exercise duration (sec) 10 sec Estimated workload 7 METS  · Baseline Vitals Baseline HR 82 bpm Baseline /66 mmHg Peak Stress Vitals Peak  bpm Peak /104 mmHg Recovery Vitals Recovery HR 92 bpm Recovery /84 mmHg Exercise Data Target HR (85%) 146 bpm Max. Pred. HR (100%) 172 bpm Percent Max Pred HR 88.37 %  · Non-specific ST-T wave changes noted during stress. ST segment depression was noted during stress in the II, V4, V5 and V6 leads.  · Findings consistent with an indeterminate ECG stress test.  · Nuclear Perfusion Findings  · Myocardial perfusion imaging indicates a normal myocardial perfusion study with no evidence of ischemia.  · Normal LV cavity size. Normal LV wall motion noted.  · Left ventricular ejection fraction is normal. (Calculated EF = 56%).  · Impressions are consistent with a low risk study.         Echo   · Normal left ventricular cavity size and wall thickness noted. All left ventricular wall segments contract normally  · Left ventricular ejection fraction appears to be 61 - 65%.  · The aortic valve is structurally normal with no regurgitation or stenosis present.  · The mitral valve is structurally normal with no significant stenosis present. Trace mitral valve regurgitation is present.  · There is no evidence of pericardial effusion.      HOSPITAL  COURSE  Patient is a 46 y.o. female presented on 10.20 to [facility] complaining of chest pain .  Please see the admitting history and physical for further details.      Ms. Rose is our 45 yo F with hx PE, essential hypertension, hypothyroidism, DM that presented with chest pain. Patient reported it was a burning sensation that radiated across shoulders and to back. She notes she has GERD and sometimes misses her PPI. She notes discomfort is different from prior GERD and does not appear to be exertional. Patient reported to me she thinks it is from anxiety as she has had multiple stressors in her life. Patient started on ASA/Statin and admitted for ischemic w/u. Stress perfusion study was low risk. Nonspecific changes on stress EKG but given normal perfusion study not likely to be clinically relevant. LDL<100. A1C 7.2%. Patient to f/u with Dr. Thurston in 1 week. Recommended compliance to PPI and will given PRN vistaril for anxiety. May benefit from outpatient psychiatric evaluation and therapy.     VITAL SIGNS:        on   ;        Body mass index is 49.81 kg/m².  Wt Readings from Last 3 Encounters:   10/21/21 132 kg (290 lb 3.2 oz)   06/29/19 133 kg (294 lb)   03/21/17 126 kg (278 lb)       PHYSICAL EXAM:  Constitutional:  Well-developed and well-nourished.  No respiratory distress. Obese.      HENT:  Head:  Normocephalic and atraumatic.  Mouth:  Moist mucous membranes.    Eyes:  Conjunctivae and EOM are normal.  Pupils are equal, round, and reactive to light.  No scleral icterus.    Cardiovascular:  Normal rate, regular rhythm and normal heart sounds with no murmur.  Pulmonary/Chest:  No respiratory distress, no wheezes, no crackles, with normal breath sounds and good air movement.  Abdominal:  Soft.  Bowel sounds are normal.  No distension and no tenderness.   Musculoskeletal:  No edema, no tenderness, and no deformity.  No red or swollen joints anywhere.    Neurological:  Alert and oriented to  person, place, and time.  No gross neurological deficit.   Skin:  Skin is warm and dry. No rash noted. No pallor.   Peripheral vascular:  Strong pulses in all 4 extremities with no clubbing, no cyanosis, no edema.    DISCHARGE DISPOSITION   Stable    DISCHARGE MEDICATIONS:     Discharge Medications      Continue These Medications      Instructions Start Date   busPIRone 15 MG tablet  Commonly known as: BUSPAR   15 mg, Oral, 2 Times Daily      cholecalciferol 25 MCG (1000 UT) tablet  Commonly known as: VITAMIN D3   1,000 Units, Oral, Daily      levothyroxine 75 MCG tablet  Commonly known as: SYNTHROID, LEVOTHROID   75 mcg, Oral, Daily      lisinopril 20 MG tablet  Commonly known as: PRINIVIL,ZESTRIL   20 mg, Oral, Daily      metoprolol succinate XL 50 MG 24 hr tablet  Commonly known as: TOPROL-XL   50 mg, Oral, Nightly      omeprazole 20 MG capsule  Commonly known as: priLOSEC   20 mg, Oral, Daily      SITagliptin 100 MG tablet  Commonly known as: JANUVIA   100 mg, Oral, Daily         ASK your doctor about these medications      Instructions Start Date   hydrOXYzine 25 MG tablet  Commonly known as: ATARAX  Ask about: Should I take this medication?   25 mg, Oral, 3 Times Daily PRN             Diet Instructions    Resume diet as tolerated.         Activity Instructions    Resume activity as tolerated.          Follow-up Information     Linda Thurston MD Follow up in 1 week(s).    Specialty: Family Medicine  Why: OCT 26@ 10:15AM AS SCHEDULED  Contact information:  [address] [place] [id number] [phone]                          TEST  RESULTS PENDING AT DISCHARGE       CODE STATUS  Code Status and Medical Interventions:   Ordered at: 10/20/21 7305     Level Of Support Discussed With:    Patient     Code Status (Patient has no pulse and is not breathing):    CPR (Attempt to Resuscitate)     Medical Interventions (Patient has pulse or is breathing):    Full       Newton Jung MD  [facility]  [facility] Hospitalist  11/08/21  14:14 EST    Please note that this discharge summary required more than 30 minutes to complete.

## 2021-10-21 NOTE — H&P
[facility] Medicine [facility]  HISTORY & PHYSICAL    Patient Identification:  Name:  Arabella Rose  Age:  46 y.o.  Sex:  female  :  1975  MRN:  [id number]   Visit Number:  [id number]  Admit Date: 10/20/2021   Primary Care Physician:  Linda Thurston MD     Subjective     Chief complaint:   Chief Complaint   Patient presents with   • Chest Pain     History of presenting illness:   Patient is a 46 y.o. female with past medical history significant for history of PE, essential hypertension, hypothyroidism, that presented to the [facility] emergency department for evaluation of chest pain.     The patient reports that she has been experiencing intermittent chest pain for the past 3 days.  She went to [facility] emergency department on Monday (10/18/2021) for chest pain and was told it was likely due to anxiety.  She reports that her work-up while at [facility] ED was unremarkable.  She states that over the past few days her pain has not worsened but remained the same.  She admits that the chest pain worsens when she feels anxious and describes it as a diffuse burning sensation across her chest that radiates to her bilateral shoulders and into her back.  She also admits to occasional shortness of breath, nausea, and pressure associated with the chest discomfort but denies any emesis.  She further reports that the pain actually gets better with ambulation.  She admits to having a stress test over 10 years ago that revealed no evidence of ischemia.  She denies any personal cardiac history and believes that her father has coronary artery disease.  As stated above, she admits to increased stressors at home as she currently takes care of her mother and has 11-year-old daughter.  She also admits to having a another daughter who passed away in  due to a pediatric heart abnormality.  She denies any thoughts of hurting herself or others.  She is currently taking BuSpar  for her anxiety which does improve her nervousness.  She denies any recent illness, fever, chills, congestion, sore throat, coughing, wheezing, leg edema, palpitations, abdominal pain, diarrhea, dysuria, wounds, dizziness, lightheadedness, syncope.  She also denies any smoking/smokeless tobacco use or e-cigarette use.  She also denies any illicit drug or alcohol use.    Upon arrival to the ED, vitals were temperature 98.8F, pulse 89, RR 16, /85, SpO2 97% RA. Troponin T negative x 2. CMP with glucose 156, ALT 38. CBC with WBC 11.19, otherwise unremarkable. COVID 19 negative. CXR with no acute cardiopulmonary findings. EKG with NSR, possible left atrial enlargement, HR 80, QTc 408 ms.     In the ED the patient received  mg, Nitrostat 0.5 inch, IV Zofran 4 mg.     Patient has been admitted to the telemetry floor as an observation student for further evaluation and treatment.   ---------------------------------------------------------------------------------------------------------------------   Review of Systems   Constitutional: Negative for appetite change, chills, diaphoresis, fatigue and fever.   HENT: Negative for congestion, sinus pain and sore throat.    Eyes: Negative for photophobia and visual disturbance.   Respiratory: Positive for shortness of breath. Negative for cough, chest tightness and wheezing.    Cardiovascular: Positive for chest pain (Diffuse burning across chest with radiation to bilateral shoulders and into the back). Negative for palpitations and leg swelling.   Gastrointestinal: Positive for nausea. Negative for abdominal pain, constipation, diarrhea and vomiting.   Endocrine: Negative for cold intolerance and heat intolerance.   Genitourinary: Negative for decreased urine volume, dysuria and frequency.   Musculoskeletal: Negative for arthralgias and myalgias.   Skin: Negative for rash and wound.   Allergic/Immunologic: Negative for environmental allergies and immunocompromised  state.   Neurological: Negative for dizziness, syncope, weakness, light-headedness and headaches.   Psychiatric/Behavioral: Negative for confusion, self-injury and suicidal ideas. The patient is nervous/anxious.       ---------------------------------------------------------------------------------------------------------------------   Past Medical History:   Diagnosis Date   • Anxiety    • Depression    • History of pulmonary embolus (PE)    • Hypertension    • Hypothyroidism      Past Surgical History:   Procedure Laterality Date   • CHOLECYSTECTOMY     • EYE SURGERY     • PULMONARY EMBOLISM SURGERY       History reviewed. No pertinent family history.  Social History     Socioeconomic History   • Marital status:    Tobacco Use   • Smoking status: Never Smoker   Substance and Sexual Activity   • Alcohol use: No   • Drug use: No     ---------------------------------------------------------------------------------------------------------------------   Allergies:  Codeine and Prozac [fluoxetine hcl]  ---------------------------------------------------------------------------------------------------------------------   Medications below are reported home medications pulling from within the system; at this time, these medications have not been reconciled unless otherwise specified and are in the verification process for further verifcation as current home medications.    Prior to Admission Medications     Prescriptions Last Dose Informant Patient Reported? Taking?    levothyroxine (SYNTHROID, LEVOTHROID) 75 MCG tablet   Yes No    Take 75 mcg by mouth Daily.    lisinopril (PRINIVIL,ZESTRIL) 20 MG tablet   Yes No    Take 20 mg by mouth Daily.    metoprolol succinate XL (TOPROL-XL) 50 MG 24 hr tablet   Yes No    Take 50 mg by mouth Daily.    omeprazole (priLOSEC) 20 MG capsule   Yes No    Take 20 mg by mouth Daily.         ---------------------------------------------------------------------------------------------------------------------    Objective     Hospital Scheduled Meds:  Morphine, 2 mg, Intravenous, Once           Current listed hospital scheduled medications may not yet reflect those currently placed in orders that are signed and held, awaiting patient's arrival to floor/unit.    ---------------------------------------------------------------------------------------------------------------------   Vital Signs:  Temp:  [98.8 °F (37.1 °C)] 98.8 °F (37.1 °C)  Heart Rate:  [64-90] 80  Resp:  [16] 16  BP: (103-161)/(46-90) 133/80  Mean Arterial Pressure (Non-Invasive) for the past 24 hrs (Last 3 readings):   Noninvasive MAP (mmHg)   10/20/21 2033 95   10/20/21 2017 103   10/20/21 2002 95     SpO2 Percentage    10/20/21 2017 10/20/21 2033 10/20/21 2042   SpO2: 97% 98% 98%     SpO2:  [92 %-100 %] 98 %  on   ;        Body mass index is 48.92 kg/m².  Wt Readings from Last 3 Encounters:   10/20/21 129 kg (285 lb)   06/29/19 133 kg (294 lb)   03/21/17 126 kg (278 lb)     ---------------------------------------------------------------------------------------------------------------------   Physical Exam:  Physical Exam  Constitutional:       General: She is awake.      Appearance: Normal appearance. She is morbidly obese.   HENT:      Head: Normocephalic and atraumatic.   Eyes:      General: Lids are normal.         Right eye: No discharge.         Left eye: No discharge.   Cardiovascular:      Rate and Rhythm: Normal rate and regular rhythm.      Pulses: Normal pulses. No decreased pulses.           Dorsalis pedis pulses are 2+ on the right side and 2+ on the left side.        Posterior tibial pulses are 2+ on the right side and 2+ on the left side.      Heart sounds: Normal heart sounds. No murmur heard.  No friction rub. No gallop.    Pulmonary:      Effort: Pulmonary effort is normal. No tachypnea or bradypnea.      Breath  sounds: Normal breath sounds. No decreased breath sounds, wheezing, rhonchi or rales.   Chest:      Chest wall: No tenderness.   Abdominal:      General: Bowel sounds are normal.      Palpations: Abdomen is soft.      Tenderness: There is no abdominal tenderness.   Musculoskeletal:      Right lower leg: No edema.      Left lower leg: No edema.   Skin:     Findings: No abrasion, ecchymosis or erythema.   Neurological:      General: No focal deficit present.      Mental Status: She is alert and oriented to person, place, and time. Mental status is at baseline.   Psychiatric:         Mood and Affect: Mood is anxious. Mood is not depressed. Affect is not tearful.         Behavior: Behavior is cooperative.         Thought Content: Thought content does not include homicidal or suicidal ideation. Thought content does not include homicidal or suicidal plan.         Cognition and Memory: Cognition normal.       ---------------------------------------------------------------------------------------------------------------------  EKG:  Pending cardiology read. Per my evaluation, NSR with possible left atrial enlargement, HR 80 bpm, QTc 408 ms.     Telemetry:    NSR, HR 77, SpO2 97% on RA.     I have personally reviewed the EKG/Telemetry strip  ---------------------------------------------------------------------------------------------------------------------   Results from last 7 days   Lab Units 10/20/21  1656 10/20/21  1500   TROPONIN T ng/mL <0.010 <0.010     Results from last 7 days   Lab Units 10/20/21  1500   PROBNP pg/mL 201.5         Results from last 7 days   Lab Units 10/20/21  1500   WBC 10*3/mm3 11.19*   HEMOGLOBIN g/dL 14.3   HEMATOCRIT % 43.8   MCV fL 88.3   MCHC g/dL 32.6   PLATELETS 10*3/mm3 296     Results from last 7 days   Lab Units 10/20/21  1500   SODIUM mmol/L 139   POTASSIUM mmol/L 4.0   MAGNESIUM mg/dL 2.0   CHLORIDE mmol/L 104   CO2 mmol/L 26.7   BUN mg/dL 9   CREATININE mg/dL 0.84   EGFR IF NONAFRICN  AM mL/min/1.73 72   CALCIUM mg/dL 9.4   GLUCOSE mg/dL 156*   ALBUMIN g/dL 4.21   BILIRUBIN mg/dL 0.5   ALK PHOS U/L 77   AST (SGOT) U/L 27   ALT (SGPT) U/L 38*   Estimated Creatinine Clearance: 109.1 mL/min (by C-G formula based on SCr of 0.84 mg/dL).    Lab Results   Component Value Date    TSH 2.750 10/20/2021     Pain Management Panel     Pain Management Panel Latest Ref Rng & Units 2/27/2017    AMPHETAMINES SCREEN, URINE Negative Negative    BARBITURATES SCREEN Negative Negative    BENZODIAZEPINE SCREEN, URINE Negative Negative    COCAINE SCREEN, URINE Negative Negative    METHADONE SCREEN, URINE Negative Negative        I have personally reviewed the above laboratory results.   ---------------------------------------------------------------------------------------------------------------------  Imaging Results (Last 7 Days)     Procedure Component Value Units Date/Time    XR Chest 1 View [[id number]] Collected: 10/20/21 1552     Updated: 10/20/21 1603    Narrative:      XR CHEST 1 VW-     CLINICAL INDICATION: cp        COMPARISON: 06/29/2019      TECHNIQUE: Single frontal view of the chest.     FINDINGS:     There is no focal alveolar infiltrate or effusion.  The cardiac silhouette is normal. The pulmonary vasculature is  unremarkable.  There is no evidence of an acute osseous abnormality.   There are no suspicious-appearing parenchymal soft tissue nodules.          Impression:      No evidence of active or acute cardiopulmonary disease on today's chest  radiograph.     This report was finalized on 10/20/2021 3:52 PM by Dr. Musa Henriquez MD.           I have personally reviewed the above radiology results.     ---------------------------------------------------------------------------------------------------------------------    Assessment & Plan      Active Hospital Problems    Diagnosis  POA   • **Chest pain [R07.9]  Yes     #Chest pain POA with mixed features   -Currently chest pain free  -Troponin T negative  x 2; continue to trend  -EKG without acute ischemic changes; serial EKGs ordered  -Obtain fasting AM lipid panel, TTE, UDS  -NPO after midnight in anticipation for stress test in AM  -Received loading does ASA in ED; continue with daily ASA  -Monitor closely on telemetry     #Mildly elevated ALT   -ALT 38  -Will obtain acute hepatitis panel    #History of pulmonary emboli  -Based off of current home medication list, does not appear to be on any chronic anticoagulation   -SQ heparin for VTE prophylaxis     #Type II diabetes mellitus   -Obtain hemoglobin A1c   -Hold home oral hypoglycemics to prevent hypoglycemia  -Will add SSI for now. Accu-cheks qAC and qhs. Titrate insulin therapy as necessary.      #Hypothyroidism   -TSH is within normal limits   -Plan to continue home Synthroid     #Essential hypertension   -Review home medications once available   -Plan to resume home antihypertensive agents with appropriate holding parameters once reconciled per pharmacy     #Anxiety  #Depression   -Continue home Buspar  -Atarax available PRN     #Morbid obesity  -BMI 48.92 kg/m2   -Complicates all aspects of patient care     F/E/N: No IV fluids. Replace electrolytes as necessary. Consistent carb diet, NPO after midnight   ---------------------------------------------------  DVT Prophylaxis: Subcutaneous heparin   GI Prophylaxis: Protonix   Activity: Up with assistance, fall precautions     OBSERVATION status, however if further evaluation or treatment plans warrant, status may change.  Based upon current information, I predict patient's care encounter to be less than or equal to 2 midnights.    Code Status: FULL CODE     Disposition/Discharge planning: Plan for home at discharge. Pending clinical course.     I have discussed the patient's assessment and plan with the patient, nursing staff, and attending physician       Alona Bueon PA-C  Hospitalist Service -- [facility]       10/20/21  21:04  EDT    Attending Physician: Whitney Wayne MD

## 2022-01-27 ENCOUNTER — OFFICE VISIT (OUTPATIENT)
Dept: CARDIOLOGY | Facility: CLINIC | Age: 47
End: 2022-01-27

## 2022-01-27 VITALS
TEMPERATURE: 97.8 F | SYSTOLIC BLOOD PRESSURE: 120 MMHG | BODY MASS INDEX: 45.75 KG/M2 | WEIGHT: 268 LBS | HEART RATE: 72 BPM | OXYGEN SATURATION: 98 % | DIASTOLIC BLOOD PRESSURE: 90 MMHG | HEIGHT: 64 IN

## 2022-01-27 DIAGNOSIS — I10 HYPERTENSION, UNSPECIFIED TYPE: Primary | ICD-10-CM

## 2022-01-27 DIAGNOSIS — E11.9 TYPE 2 DIABETES MELLITUS WITHOUT COMPLICATION, WITHOUT LONG-TERM CURRENT USE OF INSULIN: ICD-10-CM

## 2022-01-27 DIAGNOSIS — Z86.79 HISTORY OF CARDIAC MURMUR: ICD-10-CM

## 2022-01-27 DIAGNOSIS — E66.01 MORBID OBESITY WITH BMI OF 45.0-49.9, ADULT: ICD-10-CM

## 2022-01-27 DIAGNOSIS — E78.5 HYPERLIPIDEMIA, UNSPECIFIED HYPERLIPIDEMIA TYPE: ICD-10-CM

## 2022-01-27 PROCEDURE — 99204 OFFICE O/P NEW MOD 45 MIN: CPT | Performed by: NURSE PRACTITIONER

## 2022-01-27 PROCEDURE — 93000 ELECTROCARDIOGRAM COMPLETE: CPT | Performed by: NURSE PRACTITIONER

## 2022-01-27 RX ORDER — HYDROXYZINE PAMOATE 25 MG/1
25 CAPSULE ORAL 2 TIMES DAILY PRN
COMMUNITY
Start: 2022-01-01

## 2022-01-27 RX ORDER — ATORVASTATIN CALCIUM 40 MG/1
40 TABLET, FILM COATED ORAL DAILY
COMMUNITY
Start: 2022-01-22

## 2022-01-27 NOTE — PROGRESS NOTES
Subjective   Arabella Rose is a 46 y.o. female.   Chief Complaint   Patient presents with   • Establish Care   • Heart Murmur   • Shortness of Breath     with exertion      History of Present Illness   Arabella Rose is a 46-year-old female who presents to the clinic today for cardiac evaluation.    She was referred by her primary for cardiac murmur however patient reports she is here to follow-up testing results post hospitalization.  She was hospitalized in October 2021 at [facility] for chest pain.  She underwent cardiac work-up with a nuclear stress test showing normal myocardial perfusion study with no evidence of ischemia.  Echocardiogram with a normal LVEF of 61 to 65% with no significant valvular disease or pericardial effusion.  She states since hospital discharge she has been doing relatively well.  She denies further episodes of chest discomfort, dyspnea, palpitations or lower extremity swelling.  She reports she has been told in the past she had a cardiac murmur.     Hypertension currently controlled on lisinopril 20 mg daily.  She reports home readings are less than 140/90.  She currently takes metoprolol XL 50 mg for palpitations/tachycardia and feels that symptoms are well controlled.  She was recently diagnosed with diabetes and PCP started on statin therapy.  She reports she has been doing well with her medication, denies myalgias.  She is a non-smoker.      The following portions of the patient's history were reviewed and updated as appropriate: allergies, current medications, past family history, past medical history, past social history, past surgical history and problem list.    Current Outpatient Medications:   •  atorvastatin (LIPITOR) 40 MG tablet, Take 40 mg by mouth Daily., Disp: , Rfl:   •  busPIRone (BUSPAR) 15 MG tablet, Take 15 mg by mouth 2 (Two) Times a Day., Disp: , Rfl:   •  cholecalciferol (VITAMIN D3) 25 MCG (1000 UT) tablet, Take 1,000 Units by mouth Daily.,  Disp: , Rfl:   •  hydrOXYzine pamoate (VISTARIL) 25 MG capsule, Take 25 mg by mouth 2 (Two) Times a Day As Needed., Disp: , Rfl:   •  levothyroxine (SYNTHROID, LEVOTHROID) 75 MCG tablet, Take 75 mcg by mouth Daily., Disp: , Rfl:   •  lisinopril (PRINIVIL,ZESTRIL) 20 MG tablet, Take 20 mg by mouth Daily., Disp: , Rfl:   •  metFORMIN (GLUCOPHAGE) 500 MG tablet, Take 500 mg by mouth Every 12 (Twelve) Hours., Disp: , Rfl:   •  metoprolol succinate XL (TOPROL-XL) 50 MG 24 hr tablet, Take 50 mg by mouth Every Night., Disp: , Rfl:   •  omeprazole (priLOSEC) 20 MG capsule, Take 20 mg by mouth Daily., Disp: , Rfl:     Review of Systems   Constitutional: Negative for activity change, appetite change, chills, diaphoresis, fatigue and fever.   HENT: Negative for congestion, drooling, ear discharge, ear pain, mouth sores, nosebleeds, postnasal drip, rhinorrhea, sinus pressure, sneezing and sore throat.    Eyes: Negative for pain, discharge and visual disturbance.   Respiratory: Negative for cough, chest tightness, shortness of breath and wheezing.    Cardiovascular: Negative for chest pain, palpitations and leg swelling.   Gastrointestinal: Negative for abdominal pain, constipation, diarrhea, nausea and vomiting.   Endocrine: Negative for cold intolerance, heat intolerance, polydipsia, polyphagia and polyuria.   Musculoskeletal: Negative for arthralgias, myalgias and neck pain.   Skin: Negative for rash and wound.   Neurological: Negative for syncope, speech difficulty, weakness, light-headedness and headaches.   Hematological: Negative for adenopathy. Does not bruise/bleed easily.   Psychiatric/Behavioral: Negative for confusion, dysphoric mood and sleep disturbance. The patient is not nervous/anxious.    All other systems reviewed and are negative.    Past Medical History:   Diagnosis Date   • Anxiety    • Depression    • History of pulmonary embolus (PE)    • Hypertension    • Hypothyroidism      Patient Active Problem List  "  Diagnosis   • Chest pain   • Type 2 diabetes mellitus without complication, without long-term current use of insulin ([facility])   • History of cardiac murmur   • Hyperlipidemia   • Hypertension   • Morbid obesity with BMI of 45.0-49.9, adult ([facility])     /90 (BP Location: Left arm, Patient Position: Sitting, Cuff Size: Adult)   Pulse 72   Temp 97.8 °F (36.6 °C)   Ht 162.6 cm (64\")   Wt 122 kg (268 lb)   LMP  (LMP Unknown)   SpO2 98%   BMI 46.00 kg/m²      Objective   Allergies   Allergen Reactions   • Codeine    • Prozac [Fluoxetine Hcl]      Physical Exam  Vitals reviewed.   Constitutional:       Appearance: Normal appearance. She is well-developed.   HENT:      Head: Normocephalic.   Eyes:      Conjunctiva/sclera: Conjunctivae normal.   Neck:      Thyroid: No thyromegaly.      Vascular: No carotid bruit or JVD.   Cardiovascular:      Rate and Rhythm: Normal rate and regular rhythm.   Pulmonary:      Effort: Pulmonary effort is normal.      Breath sounds: Normal breath sounds.   Abdominal:      General: Bowel sounds are normal.      Palpations: Abdomen is soft. There is no hepatomegaly, splenomegaly or mass.      Tenderness: There is no abdominal tenderness.   Musculoskeletal:      Cervical back: Normal range of motion and neck supple.      Right lower leg: No edema.      Left lower leg: No edema.   Skin:     General: Skin is warm and dry.   Neurological:      Mental Status: She is alert and oriented to person, place, and time.   Psychiatric:         Attention and Perception: Attention normal.         Mood and Affect: Mood normal.         Speech: Speech normal.         Behavior: Behavior normal. Behavior is cooperative.         Cognition and Memory: Cognition normal.                ECG 12 Lead    Date/Time: 1/27/2022 4:06 PM  Performed by: Divine Allan APRN  Authorized by: Divine Allan APRN   Comparison: compared with previous ECG   Similar to previous ECG  Rhythm: sinus rhythm  Rate: " normal  BPM: 72    Clinical impression: normal ECG  Comments: QT/QTc - 412/436            Arabella Rose  Echo Complete w/Doppler and Color Flow  Order# [id number]  Reading physician: Mu Aleman MD Ordering physician: Newton Jung MD Study date: 10/21/21       Patient Information    Patient Name   Arabella DELONG   [id number] Legal Sex   Female  (Age)   1975 (46 y.o.)     PACS Images     Show images for Adult Transthoracic Echo Complete w/ Color, Spectral and Contrast if necessary per protocol   Sedation Narrator Report    Sedation Narrator Report        Interpretation Summary    · Normal left ventricular cavity size and wall thickness noted. All left ventricular wall segments contract normally  · Left ventricular ejection fraction appears to be 61 - 65%.  · The aortic valve is structurally normal with no regurgitation or stenosis present.  · The mitral valve is structurally normal with no significant stenosis present. Trace mitral valve regurgitation is present.  · There is no evidence of pericardial effusion.        Arabella Rose  Exercise Stress Test With Myocardial Perfusion SPECT (Multi Study)  Order# [id number]  Reading physician: Mu Aleman MD Ordering physician: Newton Jung MD Study date: 10/21/21       Patient Information    Patient Name   Arabella Rose MRN   [id number] Legal Sex   Female  (Age)   1975 (46 y.o.)     Interpretation Summary    · Stress Procedure  · A stress test was performed following the [facility] protocol.  · Exercise duration (min) 4 min Exercise duration (sec) 10 sec Estimated workload 7 METS  · Baseline Vitals Baseline HR 82 bpm Baseline /66 mmHg Peak Stress Vitals Peak  bpm Peak /104 mmHg Recovery Vitals Recovery HR 92 bpm Recovery /84 mmHg Exercise Data Target HR (85%) 146 bpm Max. Pred. HR (100%) 172 bpm Percent Max Pred HR 88.37 %  · Non-specific ST-T wave changes noted during stress. ST segment depression was  noted during stress in the II, V4, V5 and V6 leads.  · Findings consistent with an indeterminate ECG stress test.  · Nuclear Perfusion Findings  · Myocardial perfusion imaging indicates a normal myocardial perfusion study with no evidence of ischemia.  · Normal LV cavity size. Normal LV wall motion noted.  · Left ventricular ejection fraction is normal. (Calculated EF = 56%).  · Impressions are consistent with a low risk study.         Lipid Panel [LAB18] (Order [id number])  Order  Date: 10/20/2021 Department: [address] Released By: Alona Bueno PA-C (auto-released) Authorizing: Newton Jung MD       Reprint Order Requisition    Lipid Panel (Order #[id number]) on 10/20/21            Lipid Panel  Order: [id number]   Status: Final result       Visible to patient: Yes (not seen)       Next appt: 07/26/2022 at 10:30 AM in Cardiology (Divine Allan, APRN)      Specimen Information: Blood         0 Result Notes      Component   Ref Range & Units 3 mo ago   Total Cholesterol   0 - 200 mg/dL 141    Triglycerides   0 - 150 mg/dL 67    HDL Cholesterol   40 - 60 mg/dL 43    LDL Cholesterol    0 - 100 mg/dL 84    VLDL Cholesterol   5 - 40 mg/dL 14    LDL/HDL Ratio  1.97    Resulting Agency [facility]                              Assessment/Plan   Diagnoses and all orders for this visit:    1. Hypertension, unspecified type (Primary)  -     ECG 12 Lead  Controlled, continue on lisinopril.  Counseling in low sodium dietary intake  Monitor BP and heart rate.    2. Hyperlipidemia, unspecified hyperlipidemia type  Continue on Lipitor    3. History of cardiac murmur  No evidence of murmur noted on exam today    4. Type 2 diabetes mellitus without complication, without long-term current use of insulin ([facility])  Managed by PCP    5. Morbid obesity with BMI of 45.0-49.9, adult ([facility])  Weightloss discussed and healthy lifestyle encouraged      Nuclear stress test, echocardiogram, hospital records from [facility]  discussed and reviewed with patient today.  [facility] ER records reviewed and discussed with patient today.  Follow-up in 6 months, sooner if needed

## 2022-02-02 ENCOUNTER — PATIENT ROUNDING (BHMG ONLY) (OUTPATIENT)
Dept: CARDIOLOGY | Facility: CLINIC | Age: 47
End: 2022-02-02

## 2022-02-02 NOTE — PROGRESS NOTES
February 2, 2022    Hello, may I speak with Arabella Rose?    My name is Champ      I am  with [facility]  [facility]  [address] [place] [address] [phone].    Before we get started may I verify your date of birth? 1975    I am calling to officially welcome you to our practice and ask about your recent visit. Is this a good time to talk? YES     Tell me about your visit with us. What things went well?  Yes, everything was fine and everyone was very nice. I was very happy with everything       We're always looking for ways to make our patients' experiences even better. Do you have recommendations on ways we may improve?   NO    Overall were you satisfied with your first visit to our practice? YES     I appreciate you taking the time to speak with me today. Is there anything else I can do for you? NO      Thank you, and have a great day.

## 2022-02-14 ENCOUNTER — HOSPITAL ENCOUNTER (EMERGENCY)
Facility: HOSPITAL | Age: 47
Discharge: HOME | End: 2022-02-14
Attending: EMERGENCY MEDICINE | Admitting: EMERGENCY MEDICINE

## 2022-02-14 ENCOUNTER — APPOINTMENT (OUTPATIENT)
Dept: CT IMAGING | Facility: HOSPITAL | Age: 47
End: 2022-02-14

## 2022-02-14 ENCOUNTER — APPOINTMENT (OUTPATIENT)
Dept: GENERAL RADIOLOGY | Facility: HOSPITAL | Age: 47
End: 2022-02-14

## 2022-02-14 VITALS
HEART RATE: 74 BPM | OXYGEN SATURATION: 95 % | RESPIRATION RATE: 16 BRPM | HEIGHT: 64 IN | BODY MASS INDEX: 47.12 KG/M2 | WEIGHT: 276 LBS | SYSTOLIC BLOOD PRESSURE: 126 MMHG | DIASTOLIC BLOOD PRESSURE: 80 MMHG | TEMPERATURE: 98 F

## 2022-02-14 DIAGNOSIS — U07.1 COVID-19: Primary | ICD-10-CM

## 2022-02-14 LAB
ALBUMIN SERPL-MCNC: 4.15 G/DL (ref 3.5–5.2)
ALBUMIN/GLOB SERPL: 1.1 G/DL
ALP SERPL-CCNC: 111 U/L (ref 39–117)
ALT SERPL W P-5'-P-CCNC: 38 U/L (ref 1–33)
ANION GAP SERPL CALCULATED.3IONS-SCNC: 13.8 MMOL/L (ref 5–15)
AST SERPL-CCNC: 32 U/L (ref 1–32)
B-HCG UR QL: NEGATIVE
BACTERIA UR QL AUTO: ABNORMAL /HPF
BASOPHILS # BLD AUTO: 0.07 10*3/MM3 (ref 0–0.2)
BASOPHILS NFR BLD AUTO: 0.6 % (ref 0–1.5)
BILIRUB SERPL-MCNC: 0.6 MG/DL (ref 0–1.2)
BILIRUB UR QL STRIP: NEGATIVE
BUN SERPL-MCNC: 7 MG/DL (ref 6–20)
BUN/CREAT SERPL: 8.9 (ref 7–25)
CALCIUM SPEC-SCNC: 9.9 MG/DL (ref 8.6–10.5)
CHLORIDE SERPL-SCNC: 102 MMOL/L (ref 98–107)
CLARITY UR: CLEAR
CO2 SERPL-SCNC: 25.2 MMOL/L (ref 22–29)
COLOR UR: ABNORMAL
CREAT SERPL-MCNC: 0.79 MG/DL (ref 0.57–1)
CRP SERPL-MCNC: 0.78 MG/DL (ref 0–0.5)
D DIMER PPP FEU-MCNC: 0.44 MCGFEU/ML (ref 0–0.5)
D-LACTATE SERPL-SCNC: 1.8 MMOL/L (ref 0.5–2)
DEPRECATED RDW RBC AUTO: 43.8 FL (ref 37–54)
EOSINOPHIL # BLD AUTO: 0.37 10*3/MM3 (ref 0–0.4)
EOSINOPHIL NFR BLD AUTO: 3 % (ref 0.3–6.2)
ERYTHROCYTE [DISTWIDTH] IN BLOOD BY AUTOMATED COUNT: 14.2 % (ref 12.3–15.4)
GFR SERPL CREATININE-BSD FRML MDRD: 78 ML/MIN/1.73
GLOBULIN UR ELPH-MCNC: 3.7 GM/DL
GLUCOSE SERPL-MCNC: 127 MG/DL (ref 65–99)
GLUCOSE UR STRIP-MCNC: NEGATIVE MG/DL
HCT VFR BLD AUTO: 44.1 % (ref 34–46.6)
HGB BLD-MCNC: 14.6 G/DL (ref 12–15.9)
HGB UR QL STRIP.AUTO: NEGATIVE
HOLD SPECIMEN: NORMAL
HOLD SPECIMEN: NORMAL
HYALINE CASTS UR QL AUTO: ABNORMAL /LPF
IMM GRANULOCYTES # BLD AUTO: 0.05 10*3/MM3 (ref 0–0.05)
IMM GRANULOCYTES NFR BLD AUTO: 0.4 % (ref 0–0.5)
KETONES UR QL STRIP: ABNORMAL
LEUKOCYTE ESTERASE UR QL STRIP.AUTO: ABNORMAL
LIPASE SERPL-CCNC: 26 U/L (ref 13–60)
LYMPHOCYTES # BLD AUTO: 4.19 10*3/MM3 (ref 0.7–3.1)
LYMPHOCYTES NFR BLD AUTO: 33.4 % (ref 19.6–45.3)
MAGNESIUM SERPL-MCNC: 1.9 MG/DL (ref 1.6–2.6)
MCH RBC QN AUTO: 28.3 PG (ref 26.6–33)
MCHC RBC AUTO-ENTMCNC: 33.1 G/DL (ref 31.5–35.7)
MCV RBC AUTO: 85.6 FL (ref 79–97)
MONOCYTES # BLD AUTO: 0.73 10*3/MM3 (ref 0.1–0.9)
MONOCYTES NFR BLD AUTO: 5.8 % (ref 5–12)
NEUTROPHILS NFR BLD AUTO: 56.8 % (ref 42.7–76)
NEUTROPHILS NFR BLD AUTO: 7.13 10*3/MM3 (ref 1.7–7)
NITRITE UR QL STRIP: NEGATIVE
NRBC BLD AUTO-RTO: 0 /100 WBC (ref 0–0.2)
NT-PROBNP SERPL-MCNC: 168.8 PG/ML (ref 0–450)
PH UR STRIP.AUTO: 5.5 [PH] (ref 5–8)
PLATELET # BLD AUTO: 310 10*3/MM3 (ref 140–450)
PMV BLD AUTO: 9.2 FL (ref 6–12)
POTASSIUM SERPL-SCNC: 3.7 MMOL/L (ref 3.5–5.2)
PROT SERPL-MCNC: 7.8 G/DL (ref 6–8.5)
PROT UR QL STRIP: NEGATIVE
QT INTERVAL: 362 MS
QTC INTERVAL: 435 MS
RBC # BLD AUTO: 5.15 10*6/MM3 (ref 3.77–5.28)
RBC # UR STRIP: ABNORMAL /HPF
REF LAB TEST METHOD: ABNORMAL
SODIUM SERPL-SCNC: 141 MMOL/L (ref 136–145)
SP GR UR STRIP: 1.02 (ref 1–1.03)
SQUAMOUS #/AREA URNS HPF: ABNORMAL /HPF
TROPONIN T SERPL-MCNC: <0.01 NG/ML (ref 0–0.03)
UROBILINOGEN UR QL STRIP: ABNORMAL
WBC # UR STRIP: ABNORMAL /HPF
WBC NRBC COR # BLD: 12.54 10*3/MM3 (ref 3.4–10.8)
WHOLE BLOOD HOLD SPECIMEN: NORMAL
WHOLE BLOOD HOLD SPECIMEN: NORMAL

## 2022-02-14 PROCEDURE — 99284 EMERGENCY DEPT VISIT MOD MDM: CPT

## 2022-02-14 PROCEDURE — 86140 C-REACTIVE PROTEIN: CPT | Performed by: NURSE PRACTITIONER

## 2022-02-14 PROCEDURE — 83690 ASSAY OF LIPASE: CPT | Performed by: NURSE PRACTITIONER

## 2022-02-14 PROCEDURE — 83735 ASSAY OF MAGNESIUM: CPT | Performed by: EMERGENCY MEDICINE

## 2022-02-14 PROCEDURE — 80053 COMPREHEN METABOLIC PANEL: CPT | Performed by: NURSE PRACTITIONER

## 2022-02-14 PROCEDURE — 93005 ELECTROCARDIOGRAM TRACING: CPT | Performed by: NURSE PRACTITIONER

## 2022-02-14 PROCEDURE — 71046 X-RAY EXAM CHEST 2 VIEWS: CPT

## 2022-02-14 PROCEDURE — 85379 FIBRIN DEGRADATION QUANT: CPT | Performed by: NURSE PRACTITIONER

## 2022-02-14 PROCEDURE — 36415 COLL VENOUS BLD VENIPUNCTURE: CPT

## 2022-02-14 PROCEDURE — 81025 URINE PREGNANCY TEST: CPT | Performed by: EMERGENCY MEDICINE

## 2022-02-14 PROCEDURE — 85025 COMPLETE CBC W/AUTO DIFF WBC: CPT | Performed by: NURSE PRACTITIONER

## 2022-02-14 PROCEDURE — 83605 ASSAY OF LACTIC ACID: CPT | Performed by: NURSE PRACTITIONER

## 2022-02-14 PROCEDURE — 81001 URINALYSIS AUTO W/SCOPE: CPT | Performed by: NURSE PRACTITIONER

## 2022-02-14 PROCEDURE — 71275 CT ANGIOGRAPHY CHEST: CPT

## 2022-02-14 PROCEDURE — 84484 ASSAY OF TROPONIN QUANT: CPT | Performed by: NURSE PRACTITIONER

## 2022-02-14 PROCEDURE — 0 IOPAMIDOL PER 1 ML: Performed by: EMERGENCY MEDICINE

## 2022-02-14 PROCEDURE — 83880 ASSAY OF NATRIURETIC PEPTIDE: CPT | Performed by: NURSE PRACTITIONER

## 2022-02-14 RX ORDER — SODIUM CHLORIDE 0.9 % (FLUSH) 0.9 %
10 SYRINGE (ML) INJECTION AS NEEDED
Status: DISCONTINUED | OUTPATIENT
Start: 2022-02-14 | End: 2022-02-15 | Stop reason: HOSPADM

## 2022-02-14 RX ADMIN — IOPAMIDOL 80 ML: 755 INJECTION, SOLUTION INTRAVENOUS at 21:50

## 2022-02-14 RX ADMIN — SODIUM CHLORIDE 1000 ML: 9 INJECTION, SOLUTION INTRAVENOUS at 19:32

## 2022-02-14 NOTE — ED NOTES
MEDICAL SCREENING:    Reason for Visit: Shortness of breath. She reports she had labs performed by PCP and was told her d-dimer was positive. She states PCP sent her to ED for further evaluation. She reports she had Covid 19 1/14/22.    Patient initially seen in triage.  The patient was advised further evaluation and diagnostic testing will be needed, some of the treatment and testing will be initiated in the lobby in order to begin the process.  The patient will be returned to the waiting area for the time being and possibly be re-assessed by a subsequent ED provider.  The patient will be brought back to the treatment area in as timely manner as possible.       Joseline Ha, APRN  02/14/22 [id number]

## 2022-02-16 NOTE — ED PROVIDER NOTES
Subjective     History provided by:  Patient   used: No    URI  Presenting symptoms: congestion and cough    Presenting symptoms: no ear pain, no fatigue, no fever and no sore throat    Severity:  Mild  Onset quality:  Gradual  Timing:  Constant  Progression:  Worsening  Chronicity:  New  Relieved by:  Nothing  Worsened by:  Nothing  Ineffective treatments:  None tried  Associated symptoms: myalgias    Associated symptoms: no arthralgias, no headaches, no neck pain, no sinus pain, no sneezing, no swollen glands and no wheezing    Risk factors: not elderly, no chronic cardiac disease, no chronic kidney disease, no chronic respiratory disease, no diabetes mellitus, no immunosuppression, no recent illness, no recent travel and no sick contacts        Review of Systems   Constitutional: Negative for activity change, appetite change, chills, diaphoresis, fatigue and fever.   HENT: Positive for congestion. Negative for ear pain, sinus pain, sneezing and sore throat.    Eyes: Negative for redness.   Respiratory: Positive for cough. Negative for chest tightness, shortness of breath and wheezing.    Cardiovascular: Negative for chest pain, palpitations and leg swelling.   Gastrointestinal: Negative for abdominal pain, diarrhea, nausea and vomiting.   Genitourinary: Negative for dysuria and urgency.   Musculoskeletal: Positive for myalgias. Negative for arthralgias, back pain and neck pain.   Skin: Negative for pallor, rash and wound.   Neurological: Negative for dizziness, speech difficulty, weakness and headaches.   Psychiatric/Behavioral: Negative for agitation, behavioral problems, confusion and decreased concentration.   All other systems reviewed and are negative.      Past Medical History:   Diagnosis Date   • Anxiety    • Depression    • History of pulmonary embolus (PE)    • Hypertension    • Hypothyroidism        Allergies   Allergen Reactions   • Codeine    • Prozac [Fluoxetine Hcl]        Past  Surgical History:   Procedure Laterality Date   • CHOLECYSTECTOMY     • EYE SURGERY     • PULMONARY EMBOLISM SURGERY         Family History   Problem Relation Age of Onset   • Stroke Mother    • Diabetes Father    • Heart disease Father    • Hypertension Father        Social History     Socioeconomic History   • Marital status:    Tobacco Use   • Smoking status: Never Smoker   • Smokeless tobacco: Never Used   Substance and Sexual Activity   • Alcohol use: No   • Drug use: No   • Sexual activity: Defer           Objective   Physical Exam  Vitals and nursing note reviewed.   Constitutional:       General: She is not in acute distress.     Appearance: Normal appearance. She is well-developed. She is not toxic-appearing or diaphoretic.   HENT:      Head: Normocephalic and atraumatic.      Right Ear: External ear normal.      Left Ear: External ear normal.      Nose: Nose normal.      Mouth/Throat:      Pharynx: No oropharyngeal exudate.      Tonsils: No tonsillar exudate.   Eyes:      General: Lids are normal.      Conjunctiva/sclera: Conjunctivae normal.      Pupils: Pupils are equal, round, and reactive to light.   Neck:      Thyroid: No thyromegaly.   Cardiovascular:      Rate and Rhythm: Normal rate and regular rhythm.      Pulses: Normal pulses.      Heart sounds: Normal heart sounds, S1 normal and S2 normal.   Pulmonary:      Effort: Pulmonary effort is normal. No tachypnea or respiratory distress.      Breath sounds: Normal breath sounds. No decreased breath sounds, wheezing or rales.   Chest:      Chest wall: No tenderness.   Abdominal:      General: Bowel sounds are normal. There is no distension.      Palpations: Abdomen is soft.      Tenderness: There is no abdominal tenderness. There is no guarding or rebound.   Musculoskeletal:         General: No tenderness or deformity. Normal range of motion.      Cervical back: Full passive range of motion without pain, normal range of motion and neck supple.    Lymphadenopathy:      Cervical: No cervical adenopathy.   Skin:     General: Skin is warm and dry.      Coloration: Skin is not pale.      Findings: No erythema or rash.   Neurological:      Mental Status: She is alert and oriented to person, place, and time.      GCS: GCS eye subscore is 4. GCS verbal subscore is 5. GCS motor subscore is 6.      Cranial Nerves: No cranial nerve deficit.      Sensory: No sensory deficit.   Psychiatric:         Speech: Speech normal.         Behavior: Behavior normal.         Thought Content: Thought content normal.         Judgment: Judgment normal.         Procedures           ED Course  ED Course as of 02/16/22 0519   Mon Feb 14, 2022   1725 ECG 17:05 NSR, 87. Anterolateral infarct, undetermined age. QY/QTc 362/435 [ALCIDES]   1920 XR Chest 2 View  IMPRESSION:  No acute cardiopulmonary findings. [ES]   2216 CT Chest Pulmonary Embolism  IMPRESSION:     1. No aortic aneurysm or dissection.  2. No PE.  3. There is no pleural effusion or pneumonia.  4. Gallbladder surgically absent. [ES]      ED Course User Index  [ES] Fernando Bellamy MD  [ALCIDES] Fran Park MD                                                 MDM  Number of Diagnoses or Management Options  COVID-19: new and requires workup     Amount and/or Complexity of Data Reviewed  Clinical lab tests: reviewed and ordered  Tests in the radiology section of CPT®: reviewed and ordered  Tests in the medicine section of CPT®: reviewed and ordered  Review and summarize past medical records: yes  Independent visualization of images, tracings, or specimens: yes    Risk of Complications, Morbidity, and/or Mortality  Presenting problems: moderate  Diagnostic procedures: moderate  Management options: moderate    Patient Progress  Patient progress: stable      Final diagnoses:   COVID-19       ED Disposition  ED Disposition     ED Disposition Condition Comment    Discharge Stable           Linda Thurston MD  [address] [facility] [address]  [address] [address]  [phone]    Schedule an appointment as soon as possible for a visit in 1 day  EVALUATE         Medication List      No changes were made to your prescriptions during this visit.          Fernando Bellamy MD  02/16/22 0519

## 2022-07-19 ENCOUNTER — OFFICE VISIT (OUTPATIENT)
Dept: CARDIOLOGY | Facility: CLINIC | Age: 47
End: 2022-07-19

## 2022-07-19 VITALS
HEART RATE: 67 BPM | OXYGEN SATURATION: 98 % | BODY MASS INDEX: 45.07 KG/M2 | DIASTOLIC BLOOD PRESSURE: 78 MMHG | TEMPERATURE: 97.1 F | SYSTOLIC BLOOD PRESSURE: 120 MMHG | WEIGHT: 264 LBS | HEIGHT: 64 IN

## 2022-07-19 DIAGNOSIS — E66.01 MORBID OBESITY WITH BMI OF 45.0-49.9, ADULT: ICD-10-CM

## 2022-07-19 DIAGNOSIS — E78.5 HYPERLIPIDEMIA, UNSPECIFIED HYPERLIPIDEMIA TYPE: Primary | ICD-10-CM

## 2022-07-19 DIAGNOSIS — I10 PRIMARY HYPERTENSION: ICD-10-CM

## 2022-07-19 PROCEDURE — 99213 OFFICE O/P EST LOW 20 MIN: CPT | Performed by: NURSE PRACTITIONER

## 2022-07-19 RX ORDER — LISINOPRIL 30 MG/1
30 TABLET ORAL DAILY
COMMUNITY
Start: 2022-06-08

## 2022-07-19 RX ORDER — MIRTAZAPINE 15 MG/1
15 TABLET, FILM COATED ORAL
COMMUNITY
Start: 2022-06-23

## 2022-07-19 RX ORDER — NICOTINE POLACRILEX 4 MG/1
1 GUM, CHEWING ORAL DAILY
COMMUNITY
Start: 2022-04-26 | End: 2022-07-19 | Stop reason: SDUPTHER

## 2022-07-19 NOTE — PROGRESS NOTES
Subjective   Arabella Rose is a 47 y.o. female.   Chief Complaint   Patient presents with   • Hypertension     Follow up      History of Present Illness   Arabella Rose is a 47-year-old female who presents to clinic today for cardiology follow-up.     Hypertension currently on metoprolol XL 50 mg and lisinopril 30 mg daily.  She reports home BPs are generally averaging 120/85.  She reports compliance with medication.  She denies chest pain, palpitations or dyspnea.    Hyperlipidemia and continues on Lipitor 40 mg daily.  Most recent labs are reviewed showing LDL of 84 from labs on 10/20/2021.  Given underlying history of diabetes LDL should be less than 70.  She feels she had recent labs with PCP however no results available for review.  She denies myalgias and reports compliance with medications.    The following portions of the patient's history were reviewed and updated as appropriate: allergies, current medications, past family history, past medical history, past social history, past surgical history and problem list.    Current Outpatient Medications:   •  atorvastatin (LIPITOR) 40 MG tablet, Take 40 mg by mouth Daily., Disp: , Rfl:   •  busPIRone (BUSPAR) 15 MG tablet, Take 15 mg by mouth 2 (Two) Times a Day., Disp: , Rfl:   •  hydrOXYzine pamoate (VISTARIL) 25 MG capsule, Take 25 mg by mouth 2 (Two) Times a Day As Needed., Disp: , Rfl:   •  levothyroxine (SYNTHROID, LEVOTHROID) 75 MCG tablet, Take 75 mcg by mouth Daily., Disp: , Rfl:   •  lisinopril (PRINIVIL,ZESTRIL) 30 MG tablet, Take 30 mg by mouth Daily., Disp: , Rfl:   •  metFORMIN (GLUCOPHAGE) 500 MG tablet, Take 500 mg by mouth Every 12 (Twelve) Hours., Disp: , Rfl:   •  metoprolol succinate XL (TOPROL-XL) 50 MG 24 hr tablet, Take 50 mg by mouth Every Night., Disp: , Rfl:   •  mirtazapine (REMERON) 15 MG tablet, Take 15 mg by mouth every night at bedtime., Disp: , Rfl:   •  omeprazole (priLOSEC) 20 MG capsule, Take 20 mg by mouth Daily.,  "Disp: , Rfl:     Review of Systems   Constitutional: Negative for activity change, appetite change, chills, diaphoresis, fatigue and fever.   HENT: Negative for congestion, drooling, ear discharge, ear pain, mouth sores, nosebleeds, postnasal drip, rhinorrhea, sinus pressure, sneezing and sore throat.    Eyes: Negative for pain, discharge and visual disturbance.   Respiratory: Negative for cough, chest tightness, shortness of breath and wheezing.    Cardiovascular: Negative for chest pain, palpitations and leg swelling.   Gastrointestinal: Negative for abdominal pain, constipation, diarrhea, nausea and vomiting.   Endocrine: Negative for cold intolerance, heat intolerance, polydipsia, polyphagia and polyuria.   Musculoskeletal: Negative for arthralgias, myalgias and neck pain.   Skin: Negative for rash and wound.   Neurological: Negative for syncope, speech difficulty, weakness, light-headedness and headaches.   Hematological: Negative for adenopathy. Does not bruise/bleed easily.   Psychiatric/Behavioral: Negative for confusion, dysphoric mood and sleep disturbance. The patient is not nervous/anxious.    All other systems reviewed and are negative.    Patient Active Problem List   Diagnosis   • Chest pain   • Type 2 diabetes mellitus without complication, without long-term current use of insulin (HCC)   • History of cardiac murmur   • Hyperlipidemia   • Hypertension   • Morbid obesity with BMI of 45.0-49.9, adult ([facility])     Past Medical History:   Diagnosis Date   • Anxiety    • Depression    • Diabetes mellitus (HCC)    • History of pulmonary embolus (PE)    • Hypertension    • Hypothyroidism      Past Surgical History:   Procedure Laterality Date   • CHOLECYSTECTOMY     • EYE SURGERY     • PULMONARY EMBOLISM SURGERY       /78   Pulse 67   Temp 97.1 °F (36.2 °C)   Ht 162.6 cm (64\")   Wt 120 kg (264 lb)   LMP  (LMP Unknown)   SpO2 98%   BMI 45.32 kg/m²     Objective   Allergies   Allergen Reactions   • " Codeine    • Prozac [Fluoxetine Hcl]        Physical Exam  Vitals reviewed.   Constitutional:       Appearance: Normal appearance. She is well-developed. She is obese.   HENT:      Head: Normocephalic.   Eyes:      Conjunctiva/sclera: Conjunctivae normal.   Neck:      Thyroid: No thyromegaly.      Vascular: No carotid bruit or JVD.   Cardiovascular:      Rate and Rhythm: Normal rate and regular rhythm.   Pulmonary:      Effort: Pulmonary effort is normal.      Breath sounds: Normal breath sounds.   Abdominal:      Palpations: Abdomen is soft. There is no hepatomegaly or splenomegaly.      Tenderness: There is no abdominal tenderness.   Musculoskeletal:      Cervical back: Normal range of motion and neck supple.      Right lower leg: No edema.      Left lower leg: No edema.   Skin:     General: Skin is warm and dry.   Neurological:      Mental Status: She is alert and oriented to person, place, and time.   Psychiatric:         Attention and Perception: Attention normal.         Mood and Affect: Mood normal.         Speech: Speech normal.         Behavior: Behavior normal. Behavior is cooperative.         Cognition and Memory: Cognition normal.            Lipid Panel  Order: [id number]   Status: Final result     Visible to patient: Yes (seen)     Next appt: None    Specimen Information: Blood         0 Result Notes     1  Topic    Component   Ref Range & Units 9 mo ago    Total Cholesterol   0 - 200 mg/dL 141    Triglycerides   0 - 150 mg/dL 67    HDL Cholesterol   40 - 60 mg/dL 43    LDL Cholesterol    0 - 100 mg/dL 84    VLDL Cholesterol   5 - 40 mg/dL 14    LDL/HDL Ratio  1.97    Resulting Agency [facility]          Assessment & Plan   Diagnoses and all orders for this visit:    1. Hyperlipidemia, unspecified hyperlipidemia type (Primary)  -     CK; Future  -     Comprehensive Metabolic Panel; Future  -     Lipid Panel; Future  LDL goal to be less than 70, will attempt to get labs from PCP to see if there is a  more recent cholesterol on file.  We will repeat labs prior to next follow-up, if LDL remains greater than 70 would consider increasing Lipitor to 80 mg for overall benefit.  Counseling in heart healthy diet.    2. Primary hypertension  Controlled, continue Lisinopril 30 mg daily and Toprol-XL 50 mg.  Counseling in low-sodium dietary intake, recommend routine monitoring of blood pressure and heart rate.    3. Morbid obesity with BMI of 45.0-49.9, adult (HCC)  Lifestyle modifications recommended including healthy diet, regular exercise, maintenance of desirable body weight and avoidance of tobacco use.    Follow-up in 6 months, sooner if needed.

## 2022-08-28 ENCOUNTER — HOSPITAL ENCOUNTER (EMERGENCY)
Facility: HOSPITAL | Age: 47
Discharge: HOME | End: 2022-08-28
Attending: STUDENT IN AN ORGANIZED HEALTH CARE EDUCATION/TRAINING PROGRAM | Admitting: STUDENT IN AN ORGANIZED HEALTH CARE EDUCATION/TRAINING PROGRAM

## 2022-08-28 ENCOUNTER — APPOINTMENT (OUTPATIENT)
Dept: GENERAL RADIOLOGY | Facility: HOSPITAL | Age: 47
End: 2022-08-28

## 2022-08-28 VITALS
HEIGHT: 64 IN | OXYGEN SATURATION: 98 % | BODY MASS INDEX: 45.24 KG/M2 | DIASTOLIC BLOOD PRESSURE: 96 MMHG | HEART RATE: 87 BPM | SYSTOLIC BLOOD PRESSURE: 156 MMHG | RESPIRATION RATE: 18 BRPM | WEIGHT: 265 LBS | TEMPERATURE: 98.4 F

## 2022-08-28 DIAGNOSIS — U07.1 COVID-19: Primary | ICD-10-CM

## 2022-08-28 LAB
FLUAV RNA RESP QL NAA+PROBE: NOT DETECTED
FLUBV RNA RESP QL NAA+PROBE: NOT DETECTED
SARS-COV-2 RNA RESP QL NAA+PROBE: DETECTED

## 2022-08-28 PROCEDURE — 71046 X-RAY EXAM CHEST 2 VIEWS: CPT

## 2022-08-28 PROCEDURE — 87636 SARSCOV2 & INF A&B AMP PRB: CPT | Performed by: STUDENT IN AN ORGANIZED HEALTH CARE EDUCATION/TRAINING PROGRAM

## 2022-08-28 PROCEDURE — 99283 EMERGENCY DEPT VISIT LOW MDM: CPT

## 2023-02-11 PROCEDURE — 99284 EMERGENCY DEPT VISIT MOD MDM: CPT

## 2023-02-12 ENCOUNTER — HOSPITAL ENCOUNTER (EMERGENCY)
Facility: HOSPITAL | Age: 48
Discharge: HOME | End: 2023-02-12
Attending: STUDENT IN AN ORGANIZED HEALTH CARE EDUCATION/TRAINING PROGRAM | Admitting: STUDENT IN AN ORGANIZED HEALTH CARE EDUCATION/TRAINING PROGRAM
Payer: MEDICAID

## 2023-02-12 ENCOUNTER — APPOINTMENT (OUTPATIENT)
Dept: GENERAL RADIOLOGY | Facility: HOSPITAL | Age: 48
End: 2023-02-12
Payer: MEDICAID

## 2023-02-12 VITALS
WEIGHT: 273 LBS | BODY MASS INDEX: 46.61 KG/M2 | SYSTOLIC BLOOD PRESSURE: 127 MMHG | HEART RATE: 80 BPM | TEMPERATURE: 97.5 F | OXYGEN SATURATION: 96 % | HEIGHT: 64 IN | RESPIRATION RATE: 18 BRPM | DIASTOLIC BLOOD PRESSURE: 89 MMHG

## 2023-02-12 DIAGNOSIS — N39.0 URINARY TRACT INFECTION IN FEMALE: ICD-10-CM

## 2023-02-12 DIAGNOSIS — F41.9 ANXIETY: Primary | ICD-10-CM

## 2023-02-12 LAB
ALBUMIN SERPL-MCNC: 4 G/DL (ref 3.5–5.2)
ALBUMIN/GLOB SERPL: 1.2 G/DL
ALP SERPL-CCNC: 110 U/L (ref 39–117)
ALT SERPL W P-5'-P-CCNC: 23 U/L (ref 1–33)
ANION GAP SERPL CALCULATED.3IONS-SCNC: 10.4 MMOL/L (ref 5–15)
AST SERPL-CCNC: 19 U/L (ref 1–32)
BACTERIA UR QL AUTO: ABNORMAL /HPF
BASOPHILS # BLD AUTO: 0.06 10*3/MM3 (ref 0–0.2)
BASOPHILS NFR BLD AUTO: 0.4 % (ref 0–1.5)
BILIRUB SERPL-MCNC: 0.6 MG/DL (ref 0–1.2)
BILIRUB UR QL STRIP: NEGATIVE
BUN SERPL-MCNC: 11 MG/DL (ref 6–20)
BUN/CREAT SERPL: 13.3 (ref 7–25)
CALCIUM SPEC-SCNC: 9.9 MG/DL (ref 8.6–10.5)
CHLORIDE SERPL-SCNC: 103 MMOL/L (ref 98–107)
CLARITY UR: ABNORMAL
CO2 SERPL-SCNC: 28.6 MMOL/L (ref 22–29)
COLOR UR: YELLOW
CREAT SERPL-MCNC: 0.83 MG/DL (ref 0.57–1)
CRP SERPL-MCNC: 0.78 MG/DL (ref 0–0.5)
DEPRECATED RDW RBC AUTO: 40.2 FL (ref 37–54)
EGFRCR SERPLBLD CKD-EPI 2021: 86.5 ML/MIN/1.73
EOSINOPHIL # BLD AUTO: 0.37 10*3/MM3 (ref 0–0.4)
EOSINOPHIL NFR BLD AUTO: 2.8 % (ref 0.3–6.2)
ERYTHROCYTE [DISTWIDTH] IN BLOOD BY AUTOMATED COUNT: 12.8 % (ref 12.3–15.4)
ERYTHROCYTE [SEDIMENTATION RATE] IN BLOOD: 30 MM/HR (ref 0–20)
FLUAV RNA RESP QL NAA+PROBE: NOT DETECTED
FLUBV RNA RESP QL NAA+PROBE: NOT DETECTED
GLOBULIN UR ELPH-MCNC: 3.4 GM/DL
GLUCOSE SERPL-MCNC: 104 MG/DL (ref 65–99)
GLUCOSE UR STRIP-MCNC: NEGATIVE MG/DL
HCT VFR BLD AUTO: 39.9 % (ref 34–46.6)
HGB BLD-MCNC: 13.4 G/DL (ref 12–15.9)
HGB UR QL STRIP.AUTO: ABNORMAL
HOLD SPECIMEN: NORMAL
HOLD SPECIMEN: NORMAL
HYALINE CASTS UR QL AUTO: ABNORMAL /LPF
IMM GRANULOCYTES # BLD AUTO: 0.05 10*3/MM3 (ref 0–0.05)
IMM GRANULOCYTES NFR BLD AUTO: 0.4 % (ref 0–0.5)
KETONES UR QL STRIP: NEGATIVE
LEUKOCYTE ESTERASE UR QL STRIP.AUTO: ABNORMAL
LYMPHOCYTES # BLD AUTO: 3.79 10*3/MM3 (ref 0.7–3.1)
LYMPHOCYTES NFR BLD AUTO: 28.2 % (ref 19.6–45.3)
MAGNESIUM SERPL-MCNC: 1.8 MG/DL (ref 1.6–2.6)
MCH RBC QN AUTO: 29.1 PG (ref 26.6–33)
MCHC RBC AUTO-ENTMCNC: 33.6 G/DL (ref 31.5–35.7)
MCV RBC AUTO: 86.7 FL (ref 79–97)
MONOCYTES # BLD AUTO: 0.95 10*3/MM3 (ref 0.1–0.9)
MONOCYTES NFR BLD AUTO: 7.1 % (ref 5–12)
NEUTROPHILS NFR BLD AUTO: 61.1 % (ref 42.7–76)
NEUTROPHILS NFR BLD AUTO: 8.21 10*3/MM3 (ref 1.7–7)
NITRITE UR QL STRIP: POSITIVE
NRBC BLD AUTO-RTO: 0 /100 WBC (ref 0–0.2)
NT-PROBNP SERPL-MCNC: 113.2 PG/ML (ref 0–450)
PH UR STRIP.AUTO: 6 [PH] (ref 5–8)
PLATELET # BLD AUTO: 294 10*3/MM3 (ref 140–450)
PMV BLD AUTO: 9 FL (ref 6–12)
POTASSIUM SERPL-SCNC: 4.3 MMOL/L (ref 3.5–5.2)
PROT SERPL-MCNC: 7.4 G/DL (ref 6–8.5)
PROT UR QL STRIP: ABNORMAL
QT INTERVAL: 398 MS
QTC INTERVAL: 450 MS
RBC # BLD AUTO: 4.6 10*6/MM3 (ref 3.77–5.28)
RBC # UR STRIP: ABNORMAL /HPF
REF LAB TEST METHOD: ABNORMAL
SARS-COV-2 RNA RESP QL NAA+PROBE: NOT DETECTED
SODIUM SERPL-SCNC: 142 MMOL/L (ref 136–145)
SP GR UR STRIP: 1.01 (ref 1–1.03)
SQUAMOUS #/AREA URNS HPF: ABNORMAL /HPF
TROPONIN T SERPL HS-MCNC: 8 NG/L
UROBILINOGEN UR QL STRIP: ABNORMAL
WBC # UR STRIP: ABNORMAL /HPF
WBC NRBC COR # BLD: 13.43 10*3/MM3 (ref 3.4–10.8)
WHOLE BLOOD HOLD COAG: NORMAL
WHOLE BLOOD HOLD SPECIMEN: NORMAL

## 2023-02-12 PROCEDURE — 25010000002 LORAZEPAM PER 2 MG: Performed by: NURSE PRACTITIONER

## 2023-02-12 PROCEDURE — 93010 ELECTROCARDIOGRAM REPORT: CPT | Performed by: SPECIALIST

## 2023-02-12 PROCEDURE — 87077 CULTURE AEROBIC IDENTIFY: CPT | Performed by: NURSE PRACTITIONER

## 2023-02-12 PROCEDURE — 71045 X-RAY EXAM CHEST 1 VIEW: CPT

## 2023-02-12 PROCEDURE — 85025 COMPLETE CBC W/AUTO DIFF WBC: CPT | Performed by: NURSE PRACTITIONER

## 2023-02-12 PROCEDURE — 83880 ASSAY OF NATRIURETIC PEPTIDE: CPT | Performed by: NURSE PRACTITIONER

## 2023-02-12 PROCEDURE — 96375 TX/PRO/DX INJ NEW DRUG ADDON: CPT

## 2023-02-12 PROCEDURE — 87636 SARSCOV2 & INF A&B AMP PRB: CPT | Performed by: NURSE PRACTITIONER

## 2023-02-12 PROCEDURE — 87186 SC STD MICRODIL/AGAR DIL: CPT | Performed by: NURSE PRACTITIONER

## 2023-02-12 PROCEDURE — 81001 URINALYSIS AUTO W/SCOPE: CPT | Performed by: NURSE PRACTITIONER

## 2023-02-12 PROCEDURE — 84484 ASSAY OF TROPONIN QUANT: CPT | Performed by: NURSE PRACTITIONER

## 2023-02-12 PROCEDURE — 93005 ELECTROCARDIOGRAM TRACING: CPT | Performed by: NURSE PRACTITIONER

## 2023-02-12 PROCEDURE — C9803 HOPD COVID-19 SPEC COLLECT: HCPCS

## 2023-02-12 PROCEDURE — 87086 URINE CULTURE/COLONY COUNT: CPT | Performed by: NURSE PRACTITIONER

## 2023-02-12 PROCEDURE — 86140 C-REACTIVE PROTEIN: CPT | Performed by: NURSE PRACTITIONER

## 2023-02-12 PROCEDURE — 25010000002 CEFTRIAXONE PER 250 MG: Performed by: NURSE PRACTITIONER

## 2023-02-12 PROCEDURE — 83735 ASSAY OF MAGNESIUM: CPT | Performed by: NURSE PRACTITIONER

## 2023-02-12 PROCEDURE — 85652 RBC SED RATE AUTOMATED: CPT | Performed by: NURSE PRACTITIONER

## 2023-02-12 PROCEDURE — 96365 THER/PROPH/DIAG IV INF INIT: CPT

## 2023-02-12 PROCEDURE — 80053 COMPREHEN METABOLIC PANEL: CPT | Performed by: NURSE PRACTITIONER

## 2023-02-12 RX ORDER — LORAZEPAM 2 MG/ML
1 INJECTION INTRAMUSCULAR ONCE
Status: COMPLETED | OUTPATIENT
Start: 2023-02-12 | End: 2023-02-12

## 2023-02-12 RX ORDER — BUSPIRONE HYDROCHLORIDE 5 MG/1
15 TABLET ORAL 2 TIMES DAILY PRN
Qty: 20 TABLET | Refills: 0 | Status: SHIPPED | OUTPATIENT
Start: 2023-02-12 | End: 2023-02-22

## 2023-02-12 RX ORDER — SODIUM CHLORIDE 0.9 % (FLUSH) 0.9 %
10 SYRINGE (ML) INJECTION AS NEEDED
Status: DISCONTINUED | OUTPATIENT
Start: 2023-02-12 | End: 2023-02-12 | Stop reason: HOSPADM

## 2023-02-12 RX ORDER — CEFDINIR 300 MG/1
300 CAPSULE ORAL 2 TIMES DAILY
Qty: 14 CAPSULE | Refills: 0 | Status: SHIPPED | OUTPATIENT
Start: 2023-02-12 | End: 2023-02-19

## 2023-02-12 RX ADMIN — CEFTRIAXONE 2 G: 2 INJECTION, POWDER, FOR SOLUTION INTRAMUSCULAR; INTRAVENOUS at 02:33

## 2023-02-12 RX ADMIN — LORAZEPAM 1 MG: 2 INJECTION INTRAMUSCULAR; INTRAVENOUS at 02:09

## 2023-02-12 NOTE — ED PROVIDER NOTES
Subjective   History of Present Illness  Patient is a 47-year-old female with significant past medical history positive for anxiety, depression, type II but diabetes, hypertension, hypothyroidism presenting to the ER complaints of shortness of breath.  Patient states she has been out of her BuSpar for about a week.  Patient states that she awoke during the middle night gasping for air.  Patient does report that he has a diagnosis of sleep apnea and she does not wear her CPAP at night.  Patient denies fever, cough, chest pain, nausea, vomiting, known sick contacts, recent travel or any additional symptoms at this time.  Patient denies any alleviating or worsening factors.    History provided by:  Patient   used: No        Review of Systems   Constitutional: Negative.  Negative for fever.   HENT: Negative.    Respiratory: Positive for shortness of breath.    Cardiovascular: Negative.  Negative for chest pain.   Gastrointestinal: Negative.  Negative for abdominal pain.   Endocrine: Negative.    Genitourinary: Negative.  Negative for dysuria.   Skin: Negative.    Neurological: Negative.    Psychiatric/Behavioral: Negative.    All other systems reviewed and are negative.      Past Medical History:   Diagnosis Date   • Anxiety    • Depression    • Diabetes mellitus (HCC)    • History of pulmonary embolus (PE)    • Hypertension    • Hypothyroidism        Allergies   Allergen Reactions   • Codeine    • Prozac [Fluoxetine Hcl]        Past Surgical History:   Procedure Laterality Date   • CHOLECYSTECTOMY     • EYE SURGERY     • PULMONARY EMBOLISM SURGERY         Family History   Problem Relation Age of Onset   • Stroke Mother    • Diabetes Father    • Heart disease Father    • Hypertension Father        Social History     Socioeconomic History   • Marital status:    Tobacco Use   • Smoking status: Never   • Smokeless tobacco: Never   Vaping Use   • Vaping Use: Never used   Substance and Sexual  Activity   • Alcohol use: No   • Drug use: No   • Sexual activity: Defer           Objective   Physical Exam  Vitals and nursing note reviewed.   Constitutional:       General: She is not in acute distress.     Appearance: She is well-developed. She is obese. She is not diaphoretic.   HENT:      Head: Normocephalic and atraumatic.      Right Ear: External ear normal.      Left Ear: External ear normal.      Nose: Nose normal.   Eyes:      Conjunctiva/sclera: Conjunctivae normal.      Pupils: Pupils are equal, round, and reactive to light.   Neck:      Vascular: No JVD.      Trachea: No tracheal deviation.   Cardiovascular:      Rate and Rhythm: Normal rate and regular rhythm.      Heart sounds: Normal heart sounds. No murmur heard.  Pulmonary:      Effort: Pulmonary effort is normal. No respiratory distress.      Breath sounds: Normal breath sounds. No wheezing.   Abdominal:      General: Bowel sounds are normal.      Palpations: Abdomen is soft.      Tenderness: There is no abdominal tenderness.   Musculoskeletal:         General: No deformity. Normal range of motion.      Cervical back: Normal range of motion and neck supple.   Skin:     General: Skin is warm and dry.      Capillary Refill: Capillary refill takes 2 to 3 seconds.      Coloration: Skin is not pale.      Findings: No erythema or rash.   Neurological:      Mental Status: She is alert and oriented to person, place, and time.      Cranial Nerves: No cranial nerve deficit.   Psychiatric:         Mood and Affect: Mood is anxious.         Speech: Speech is rapid and pressured.         Behavior: Behavior normal.         Thought Content: Thought content normal.         Procedures       Results for orders placed or performed during the hospital encounter of 02/12/23   COVID-19 and FLU A/B PCR - Swab, Nasopharynx    Specimen: Nasopharynx; Swab   Result Value Ref Range    COVID19 Not Detected Not Detected - Ref. Range    Influenza A PCR Not Detected Not  Detected    Influenza B PCR Not Detected Not Detected   Comprehensive Metabolic Panel    Specimen: Blood   Result Value Ref Range    Glucose 104 (H) 65 - 99 mg/dL    BUN 11 6 - 20 mg/dL    Creatinine 0.83 0.57 - 1.00 mg/dL    Sodium 142 136 - 145 mmol/L    Potassium 4.3 3.5 - 5.2 mmol/L    Chloride 103 98 - 107 mmol/L    CO2 28.6 22.0 - 29.0 mmol/L    Calcium 9.9 8.6 - 10.5 mg/dL    Total Protein 7.4 6.0 - 8.5 g/dL    Albumin 4.0 3.5 - 5.2 g/dL    ALT (SGPT) 23 1 - 33 U/L    AST (SGOT) 19 1 - 32 U/L    Alkaline Phosphatase 110 39 - 117 U/L    Total Bilirubin 0.6 0.0 - 1.2 mg/dL    Globulin 3.4 gm/dL    A/G Ratio 1.2 g/dL    BUN/Creatinine Ratio 13.3 7.0 - 25.0    Anion Gap 10.4 5.0 - 15.0 mmol/L    eGFR 86.5 >60.0 mL/min/1.73   High Sensitivity Troponin T    Specimen: Blood   Result Value Ref Range    HS Troponin T 8 <10 ng/L   BNP    Specimen: Blood   Result Value Ref Range    proBNP 113.2 0.0 - 450.0 pg/mL   C-reactive Protein    Specimen: Blood   Result Value Ref Range    C-Reactive Protein 0.78 (H) 0.00 - 0.50 mg/dL   Sedimentation Rate    Specimen: Blood   Result Value Ref Range    Sed Rate 30 (H) 0 - 20 mm/hr   Magnesium    Specimen: Blood   Result Value Ref Range    Magnesium 1.8 1.6 - 2.6 mg/dL   CBC Auto Differential    Specimen: Blood   Result Value Ref Range    WBC 13.43 (H) 3.40 - 10.80 10*3/mm3    RBC 4.60 3.77 - 5.28 10*6/mm3    Hemoglobin 13.4 12.0 - 15.9 g/dL    Hematocrit 39.9 34.0 - 46.6 %    MCV 86.7 79.0 - 97.0 fL    MCH 29.1 26.6 - 33.0 pg    MCHC 33.6 31.5 - 35.7 g/dL    RDW 12.8 12.3 - 15.4 %    RDW-SD 40.2 37.0 - 54.0 fl    MPV 9.0 6.0 - 12.0 fL    Platelets 294 140 - 450 10*3/mm3    Neutrophil % 61.1 42.7 - 76.0 %    Lymphocyte % 28.2 19.6 - 45.3 %    Monocyte % 7.1 5.0 - 12.0 %    Eosinophil % 2.8 0.3 - 6.2 %    Basophil % 0.4 0.0 - 1.5 %    Immature Grans % 0.4 0.0 - 0.5 %    Neutrophils, Absolute 8.21 (H) 1.70 - 7.00 10*3/mm3    Lymphocytes, Absolute 3.79 (H) 0.70 - 3.10 10*3/mm3     Monocytes, Absolute 0.95 (H) 0.10 - 0.90 10*3/mm3    Eosinophils, Absolute 0.37 0.00 - 0.40 10*3/mm3    Basophils, Absolute 0.06 0.00 - 0.20 10*3/mm3    Immature Grans, Absolute 0.05 0.00 - 0.05 10*3/mm3    nRBC 0.0 0.0 - 0.2 /100 WBC   Urinalysis With Culture If Indicated - Urine, Clean Catch    Specimen: Urine, Clean Catch   Result Value Ref Range    Color, UA Yellow Yellow, Straw    Appearance, UA Cloudy (A) Clear    pH, UA 6.0 5.0 - 8.0    Specific Gravity, UA 1.014 1.005 - 1.030    Glucose, UA Negative Negative    Ketones, UA Negative Negative    Bilirubin, UA Negative Negative    Blood, UA Small (1+) (A) Negative    Protein, UA 30 mg/dL (1+) (A) Negative    Leuk Esterase, UA Small (1+) (A) Negative    Nitrite, UA Positive (A) Negative    Urobilinogen, UA 0.2 E.U./dL 0.2 - 1.0 E.U./dL   Urinalysis, Microscopic Only - Urine, Clean Catch    Specimen: Urine, Clean Catch   Result Value Ref Range    RBC, UA 6-12 (A) None Seen, 0-2 /HPF    WBC, UA 31-50 (A) None Seen, 0-2 /HPF    Bacteria, UA 4+ (A) None Seen /HPF    Squamous Epithelial Cells, UA None Seen None Seen, 0-2 /HPF    Hyaline Casts, UA 3-6 None Seen /LPF    Methodology Automated Microscopy    ECG 12 Lead Chest Pain   Result Value Ref Range    QT Interval 398 ms    QTC Interval 450 ms   Green Top (Gel)   Result Value Ref Range    Extra Tube Hold for add-ons.    Lavender Top   Result Value Ref Range    Extra Tube hold for add-on    Gold Top - SST   Result Value Ref Range    Extra Tube Hold for add-ons.    Light Blue Top   Result Value Ref Range    Extra Tube Hold for add-ons.      XR Chest 1 View   Final Result      1. Negative chest.      Signer Name: Blane Ellington MD    Signed: 2/12/2023 2:06 AM    Workstation Name: [id number]     [facility]          ED Course  ED Course as of 02/12/23 0337   Sun Feb 12, 2023   0147 EKG normal sinus rhythm at rate 77  QRS 93 QTc 450 no ST elevation. Electronically signed by Wilman Ny,  MD, 02/12/23, 1:47 AM EST.   [BB]   0156 WBC(!): 13.43 [SM]   0156 Sed Rate(!): 30 [SM]   0218 Nitrite, UA(!): Positive [SM]   0218 WBC(!): 13.43 [SM]   0218 XR Chest 1 View [SM]   0218 Leukocytes, UA(!): Small (1+) [SM]   0219 WBC, UA(!): 31-50 [SM]   0331 Work up and results were discussed throughly with the patients.  The patient will be discharged for further monitoring and management with their PCP.  Red flags, warning signs, worsening symptoms, and when to return to the ER discussed with and understood by the patients.  Patient will follow up with their PCP in a timely manner.  Vitals stable at discharge.  [SM]      ED Course User Index  [BB] Wilman Ny MD  [SM] Teresa Busby APRN                                           Medical Decision Making  Patient is a 47-year-old female with significant past medical history positive for anxiety, depression, type II but diabetes, hypertension, hypothyroidism presenting to the ER complaints of shortness of breath.  Patient states she has been out of her BuSpar for about a week.  Patient states that she awoke during the middle night gasping for air.  Patient does report that he has a diagnosis of sleep apnea and she does not wear her CPAP at night.  Patient denies fever, cough, chest pain, nausea, vomiting, known sick contacts, recent travel or any additional symptoms at this time.  Patient denies any alleviating or worsening factors. Patient to be discharged home in self care. Advised to return to the ER with new or worsening symptoms.     Anxiety: complicated acute illness or injury  Urinary tract infection in female: complicated acute illness or injury  Amount and/or Complexity of Data Reviewed  Labs: ordered. Decision-making details documented in ED Course.  Radiology: ordered. Decision-making details documented in ED Course.  ECG/medicine tests: ordered.      Risk  Prescription drug management.          Final diagnoses:   Anxiety   Urinary tract  infection in female       ED Disposition  ED Disposition     ED Disposition   Discharge    Condition   Stable    Comment   --             PATIENT CONNECTION - DAMIEN  See Provider List  Damien Carpenter [address]  [phone]  Schedule an appointment as soon as possible for a visit in 2 days           Medication List      New Prescriptions    cefdinir 300 MG capsule  Commonly known as: OMNICEF  Take 1 capsule by mouth 2 (Two) Times a Day for 7 days.        Changed    * busPIRone 15 MG tablet  Commonly known as: BUSPAR  What changed: Another medication with the same name was added. Make sure you understand how and when to take each.     * busPIRone 5 MG tablet  Commonly known as: BUSPAR  Take 3 tablets by mouth 2 (Two) Times a Day As Needed (anxiety) for up to 10 days.  What changed: You were already taking a medication with the same name, and this prescription was added. Make sure you understand how and when to take each.         * This list has 2 medication(s) that are the same as other medications prescribed for you. Read the directions carefully, and ask your doctor or other care provider to review them with you.               Where to Get Your Medications      These medications were sent to [facility] #[id number] - [address] [facility] [address] - [phone]  - [phone] [address] [address]    Phone: [phone]   · busPIRone 5 MG tablet  · cefdinir 300 MG capsule          Teresa Busby, APRN  02/12/23 0337

## 2023-02-12 NOTE — DISCHARGE INSTRUCTIONS
Call one of the offices below to establish a primary care provider.  If you are unable to get an appointment and feel it is an emergency and need to be seen immediately please return to the Emergency Department.    Call one of the office below to set up a primary care provider.    Dr. Chadwick Ackerman                                                                                                       [address] [facility] [address]  [phone]    Dr. Barros, Dr. URSULA Valentin, Dr. WILLIAM Valentin ([facility])  [address] [facility] [address]  [phone]    Dr. Calderon, Dr. Ireland, Dr. Martin ([facility])  [address] [facility] [address]  [phone]    Dr. Flores  [address] [facility] [address]  [phone]    Dr. Marinelli, Dr. Stafford, Dr. Ferreira, Dr. Ford ([facility])  [address] [place] [address] [address]  [facility] [address]  [phone]    Dr. Dara Gray  [address] [facility] [place] [address]  [phone]    Dr. Jessica Kyle  [address]   [address]  [facility] [address]  [phone]    Dr. Ackerman  [address] [facility] [address]  [phone]    Dr. Ventura, Dr. Velasquez  [address] [facility] [place] [address]  [phone]    Dr. Tejeda  [address]                                                              [address]  [facility] [address]  [phone]    Dr. Bullard  [address] [facility] [address]  [phone]    Dr. Danielle Horne  [address]  [address]  [place] [address]  [phone]    Dr. Kinsey and [facility]   [address] [facility]  [address]  [place] [address]  [phone]

## 2023-02-14 LAB — BACTERIA SPEC AEROBE CULT: ABNORMAL

## 2023-05-17 ENCOUNTER — APPOINTMENT (OUTPATIENT)
Dept: CT IMAGING | Facility: HOSPITAL | Age: 48
DRG: 683 | End: 2023-05-17
Payer: MEDICAID

## 2023-05-17 ENCOUNTER — HOSPITAL ENCOUNTER (INPATIENT)
Facility: HOSPITAL | Age: 48
LOS: 2 days | Discharge: HOME | DRG: 683 | End: 2023-05-19
Attending: STUDENT IN AN ORGANIZED HEALTH CARE EDUCATION/TRAINING PROGRAM | Admitting: STUDENT IN AN ORGANIZED HEALTH CARE EDUCATION/TRAINING PROGRAM
Payer: MEDICAID

## 2023-05-17 DIAGNOSIS — E86.0 DEHYDRATION: ICD-10-CM

## 2023-05-17 DIAGNOSIS — N17.9 ACUTE RENAL FAILURE, UNSPECIFIED ACUTE RENAL FAILURE TYPE: Primary | ICD-10-CM

## 2023-05-17 LAB
ACETONE BLD QL: NEGATIVE
ALBUMIN SERPL-MCNC: 4.6 G/DL (ref 3.5–5.2)
ALBUMIN/GLOB SERPL: 1.1 G/DL
ALP SERPL-CCNC: 97 U/L (ref 39–117)
ALT SERPL W P-5'-P-CCNC: 40 U/L (ref 1–33)
ANION GAP SERPL CALCULATED.3IONS-SCNC: 15.8 MMOL/L (ref 5–15)
AST SERPL-CCNC: 32 U/L (ref 1–32)
BACTERIA UR QL AUTO: ABNORMAL /HPF
BASOPHILS # BLD AUTO: 0.04 10*3/MM3 (ref 0–0.2)
BASOPHILS NFR BLD AUTO: 0.3 % (ref 0–1.5)
BILIRUB SERPL-MCNC: 0.4 MG/DL (ref 0–1.2)
BILIRUB UR QL STRIP: ABNORMAL
BUN SERPL-MCNC: 40 MG/DL (ref 6–20)
BUN/CREAT SERPL: 10.9 (ref 7–25)
CALCIUM SPEC-SCNC: 9.7 MG/DL (ref 8.6–10.5)
CHLORIDE SERPL-SCNC: 100 MMOL/L (ref 98–107)
CK SERPL-CCNC: 185 U/L (ref 20–180)
CLARITY UR: ABNORMAL
CO2 SERPL-SCNC: 19.2 MMOL/L (ref 22–29)
COD CRY URNS QL: ABNORMAL /HPF
COLOR UR: ABNORMAL
CREAT SERPL-MCNC: 3.66 MG/DL (ref 0.57–1)
CRP SERPL-MCNC: 1.46 MG/DL (ref 0–0.5)
DEPRECATED RDW RBC AUTO: 41.1 FL (ref 37–54)
EGFRCR SERPLBLD CKD-EPI 2021: 14.6 ML/MIN/1.73
EOSINOPHIL # BLD AUTO: 0.06 10*3/MM3 (ref 0–0.4)
EOSINOPHIL NFR BLD AUTO: 0.5 % (ref 0.3–6.2)
ERYTHROCYTE [DISTWIDTH] IN BLOOD BY AUTOMATED COUNT: 13.2 % (ref 12.3–15.4)
ERYTHROCYTE [SEDIMENTATION RATE] IN BLOOD: 15 MM/HR (ref 0–20)
GLOBULIN UR ELPH-MCNC: 4.1 GM/DL
GLUCOSE BLDC GLUCOMTR-MCNC: 119 MG/DL (ref 70–130)
GLUCOSE SERPL-MCNC: 183 MG/DL (ref 65–99)
GLUCOSE UR STRIP-MCNC: NEGATIVE MG/DL
GRAN CASTS URNS QL MICRO: ABNORMAL /LPF
HCT VFR BLD AUTO: 48.7 % (ref 34–46.6)
HGB BLD-MCNC: 16.3 G/DL (ref 12–15.9)
HGB UR QL STRIP.AUTO: ABNORMAL
HYALINE CASTS UR QL AUTO: ABNORMAL /LPF
IMM GRANULOCYTES # BLD AUTO: 0.04 10*3/MM3 (ref 0–0.05)
IMM GRANULOCYTES NFR BLD AUTO: 0.3 % (ref 0–0.5)
KETONES UR QL STRIP: ABNORMAL
LEUKOCYTE ESTERASE UR QL STRIP.AUTO: ABNORMAL
LYMPHOCYTES # BLD AUTO: 3.17 10*3/MM3 (ref 0.7–3.1)
LYMPHOCYTES NFR BLD AUTO: 26 % (ref 19.6–45.3)
MAGNESIUM SERPL-MCNC: 2.2 MG/DL (ref 1.6–2.6)
MCH RBC QN AUTO: 28.6 PG (ref 26.6–33)
MCHC RBC AUTO-ENTMCNC: 33.5 G/DL (ref 31.5–35.7)
MCV RBC AUTO: 85.6 FL (ref 79–97)
MONOCYTES # BLD AUTO: 1.42 10*3/MM3 (ref 0.1–0.9)
MONOCYTES NFR BLD AUTO: 11.7 % (ref 5–12)
NEUTROPHILS NFR BLD AUTO: 61.2 % (ref 42.7–76)
NEUTROPHILS NFR BLD AUTO: 7.45 10*3/MM3 (ref 1.7–7)
NITRITE UR QL STRIP: POSITIVE
NRBC BLD AUTO-RTO: 0 /100 WBC (ref 0–0.2)
PH UR STRIP.AUTO: <=5 [PH] (ref 5–8)
PLATELET # BLD AUTO: 378 10*3/MM3 (ref 140–450)
PMV BLD AUTO: 8.8 FL (ref 6–12)
POTASSIUM SERPL-SCNC: 4.2 MMOL/L (ref 3.5–5.2)
PROT ?TM UR-MCNC: 151.4 MG/DL
PROT SERPL-MCNC: 8.7 G/DL (ref 6–8.5)
PROT UR QL STRIP: ABNORMAL
RBC # BLD AUTO: 5.69 10*6/MM3 (ref 3.77–5.28)
RBC # UR STRIP: ABNORMAL /HPF
REF LAB TEST METHOD: ABNORMAL
SARS-COV-2 RNA RESP QL NAA+PROBE: NOT DETECTED
SODIUM SERPL-SCNC: 135 MMOL/L (ref 136–145)
SP GR UR STRIP: 1.02 (ref 1–1.03)
SQUAMOUS #/AREA URNS HPF: ABNORMAL /HPF
T4 FREE SERPL-MCNC: 1.66 NG/DL (ref 0.93–1.7)
TSH SERPL DL<=0.05 MIU/L-ACNC: 6.2 UIU/ML (ref 0.27–4.2)
UROBILINOGEN UR QL STRIP: ABNORMAL
WBC # UR STRIP: ABNORMAL /HPF
WBC NRBC COR # BLD: 12.18 10*3/MM3 (ref 3.4–10.8)

## 2023-05-17 PROCEDURE — 81001 URINALYSIS AUTO W/SCOPE: CPT | Performed by: STUDENT IN AN ORGANIZED HEALTH CARE EDUCATION/TRAINING PROGRAM

## 2023-05-17 PROCEDURE — 85025 COMPLETE CBC W/AUTO DIFF WBC: CPT | Performed by: EMERGENCY MEDICINE

## 2023-05-17 PROCEDURE — 87077 CULTURE AEROBIC IDENTIFY: CPT | Performed by: STUDENT IN AN ORGANIZED HEALTH CARE EDUCATION/TRAINING PROGRAM

## 2023-05-17 PROCEDURE — 82550 ASSAY OF CK (CPK): CPT | Performed by: EMERGENCY MEDICINE

## 2023-05-17 PROCEDURE — 86140 C-REACTIVE PROTEIN: CPT | Performed by: EMERGENCY MEDICINE

## 2023-05-17 PROCEDURE — 87635 SARS-COV-2 COVID-19 AMP PRB: CPT | Performed by: STUDENT IN AN ORGANIZED HEALTH CARE EDUCATION/TRAINING PROGRAM

## 2023-05-17 PROCEDURE — 25010000002 HEPARIN (PORCINE) PER 1000 UNITS: Performed by: STUDENT IN AN ORGANIZED HEALTH CARE EDUCATION/TRAINING PROGRAM

## 2023-05-17 PROCEDURE — 99223 1ST HOSP IP/OBS HIGH 75: CPT | Performed by: INTERNAL MEDICINE

## 2023-05-17 PROCEDURE — 99285 EMERGENCY DEPT VISIT HI MDM: CPT

## 2023-05-17 PROCEDURE — 87186 SC STD MICRODIL/AGAR DIL: CPT | Performed by: STUDENT IN AN ORGANIZED HEALTH CARE EDUCATION/TRAINING PROGRAM

## 2023-05-17 PROCEDURE — 83735 ASSAY OF MAGNESIUM: CPT | Performed by: EMERGENCY MEDICINE

## 2023-05-17 PROCEDURE — 84156 ASSAY OF PROTEIN URINE: CPT | Performed by: STUDENT IN AN ORGANIZED HEALTH CARE EDUCATION/TRAINING PROGRAM

## 2023-05-17 PROCEDURE — 82948 REAGENT STRIP/BLOOD GLUCOSE: CPT

## 2023-05-17 PROCEDURE — 80053 COMPREHEN METABOLIC PANEL: CPT | Performed by: EMERGENCY MEDICINE

## 2023-05-17 PROCEDURE — 74176 CT ABD & PELVIS W/O CONTRAST: CPT

## 2023-05-17 PROCEDURE — 84439 ASSAY OF FREE THYROXINE: CPT | Performed by: EMERGENCY MEDICINE

## 2023-05-17 PROCEDURE — 36415 COLL VENOUS BLD VENIPUNCTURE: CPT

## 2023-05-17 PROCEDURE — 74176 CT ABD & PELVIS W/O CONTRAST: CPT | Performed by: RADIOLOGY

## 2023-05-17 PROCEDURE — 85652 RBC SED RATE AUTOMATED: CPT | Performed by: EMERGENCY MEDICINE

## 2023-05-17 PROCEDURE — 82570 ASSAY OF URINE CREATININE: CPT | Performed by: STUDENT IN AN ORGANIZED HEALTH CARE EDUCATION/TRAINING PROGRAM

## 2023-05-17 PROCEDURE — 87086 URINE CULTURE/COLONY COUNT: CPT | Performed by: STUDENT IN AN ORGANIZED HEALTH CARE EDUCATION/TRAINING PROGRAM

## 2023-05-17 PROCEDURE — 82009 KETONE BODYS QUAL: CPT | Performed by: STUDENT IN AN ORGANIZED HEALTH CARE EDUCATION/TRAINING PROGRAM

## 2023-05-17 PROCEDURE — 84443 ASSAY THYROID STIM HORMONE: CPT | Performed by: EMERGENCY MEDICINE

## 2023-05-17 RX ORDER — PRAMIPEXOLE DIHYDROCHLORIDE 0.25 MG/1
0.25 TABLET ORAL NIGHTLY
COMMUNITY

## 2023-05-17 RX ORDER — AMOXICILLIN 250 MG
2 CAPSULE ORAL 2 TIMES DAILY
Status: DISCONTINUED | OUTPATIENT
Start: 2023-05-17 | End: 2023-05-19 | Stop reason: HOSPADM

## 2023-05-17 RX ORDER — SODIUM CHLORIDE 0.9 % (FLUSH) 0.9 %
10 SYRINGE (ML) INJECTION AS NEEDED
Status: DISCONTINUED | OUTPATIENT
Start: 2023-05-17 | End: 2023-05-19 | Stop reason: HOSPADM

## 2023-05-17 RX ORDER — GLUCAGON 1 MG/ML
1 KIT INJECTION
Status: DISCONTINUED | OUTPATIENT
Start: 2023-05-17 | End: 2023-05-19 | Stop reason: HOSPADM

## 2023-05-17 RX ORDER — HYDROXYZINE HYDROCHLORIDE 25 MG/1
25 TABLET, FILM COATED ORAL 3 TIMES DAILY PRN
Status: DISCONTINUED | OUTPATIENT
Start: 2023-05-17 | End: 2023-05-19 | Stop reason: HOSPADM

## 2023-05-17 RX ORDER — PRAMIPEXOLE DIHYDROCHLORIDE 0.12 MG/1
0.25 TABLET ORAL NIGHTLY
Status: DISCONTINUED | OUTPATIENT
Start: 2023-05-17 | End: 2023-05-19 | Stop reason: HOSPADM

## 2023-05-17 RX ORDER — LEVOTHYROXINE SODIUM 0.07 MG/1
75 TABLET ORAL DAILY
Status: DISCONTINUED | OUTPATIENT
Start: 2023-05-18 | End: 2023-05-19 | Stop reason: HOSPADM

## 2023-05-17 RX ORDER — SODIUM CHLORIDE 9 MG/ML
40 INJECTION, SOLUTION INTRAVENOUS AS NEEDED
Status: DISCONTINUED | OUTPATIENT
Start: 2023-05-17 | End: 2023-05-19 | Stop reason: HOSPADM

## 2023-05-17 RX ORDER — HEPARIN SODIUM 5000 [USP'U]/ML
5000 INJECTION, SOLUTION INTRAVENOUS; SUBCUTANEOUS EVERY 8 HOURS SCHEDULED
Status: DISCONTINUED | OUTPATIENT
Start: 2023-05-17 | End: 2023-05-19 | Stop reason: HOSPADM

## 2023-05-17 RX ORDER — ONDANSETRON 4 MG/1
4 TABLET, FILM COATED ORAL EVERY 6 HOURS PRN
Status: DISCONTINUED | OUTPATIENT
Start: 2023-05-17 | End: 2023-05-19 | Stop reason: HOSPADM

## 2023-05-17 RX ORDER — BISACODYL 10 MG
10 SUPPOSITORY, RECTAL RECTAL DAILY PRN
Status: DISCONTINUED | OUTPATIENT
Start: 2023-05-17 | End: 2023-05-19 | Stop reason: HOSPADM

## 2023-05-17 RX ORDER — PROMETHAZINE HYDROCHLORIDE 12.5 MG/1
12.5 TABLET ORAL EVERY 6 HOURS PRN
Status: DISCONTINUED | OUTPATIENT
Start: 2023-05-17 | End: 2023-05-19 | Stop reason: HOSPADM

## 2023-05-17 RX ORDER — ACETAMINOPHEN 325 MG/1
650 TABLET ORAL EVERY 4 HOURS PRN
Status: DISCONTINUED | OUTPATIENT
Start: 2023-05-17 | End: 2023-05-19 | Stop reason: HOSPADM

## 2023-05-17 RX ORDER — DEXTROSE MONOHYDRATE 25 G/50ML
25 INJECTION, SOLUTION INTRAVENOUS
Status: DISCONTINUED | OUTPATIENT
Start: 2023-05-17 | End: 2023-05-19 | Stop reason: HOSPADM

## 2023-05-17 RX ORDER — SODIUM CHLORIDE 0.9 % (FLUSH) 0.9 %
10 SYRINGE (ML) INJECTION EVERY 12 HOURS SCHEDULED
Status: DISCONTINUED | OUTPATIENT
Start: 2023-05-17 | End: 2023-05-19 | Stop reason: HOSPADM

## 2023-05-17 RX ORDER — LISINOPRIL 10 MG/1
30 TABLET ORAL DAILY
Status: CANCELLED | OUTPATIENT
Start: 2023-05-18

## 2023-05-17 RX ORDER — POLYETHYLENE GLYCOL 3350 17 G/17G
17 POWDER, FOR SOLUTION ORAL DAILY PRN
Status: DISCONTINUED | OUTPATIENT
Start: 2023-05-17 | End: 2023-05-19 | Stop reason: HOSPADM

## 2023-05-17 RX ORDER — BUSPIRONE HYDROCHLORIDE 15 MG/1
15 TABLET ORAL 2 TIMES DAILY PRN
COMMUNITY

## 2023-05-17 RX ORDER — NITROGLYCERIN 0.4 MG/1
0.4 TABLET SUBLINGUAL
Status: DISCONTINUED | OUTPATIENT
Start: 2023-05-17 | End: 2023-05-19 | Stop reason: HOSPADM

## 2023-05-17 RX ORDER — BISACODYL 5 MG/1
5 TABLET, DELAYED RELEASE ORAL DAILY PRN
Status: DISCONTINUED | OUTPATIENT
Start: 2023-05-17 | End: 2023-05-19 | Stop reason: HOSPADM

## 2023-05-17 RX ORDER — PANTOPRAZOLE SODIUM 40 MG/1
40 TABLET, DELAYED RELEASE ORAL
Status: DISCONTINUED | OUTPATIENT
Start: 2023-05-18 | End: 2023-05-19 | Stop reason: HOSPADM

## 2023-05-17 RX ORDER — NICOTINE POLACRILEX 4 MG
15 LOZENGE BUCCAL
Status: DISCONTINUED | OUTPATIENT
Start: 2023-05-17 | End: 2023-05-19 | Stop reason: HOSPADM

## 2023-05-17 RX ORDER — METOPROLOL SUCCINATE 50 MG/1
50 TABLET, EXTENDED RELEASE ORAL DAILY
Status: DISCONTINUED | OUTPATIENT
Start: 2023-05-18 | End: 2023-05-19 | Stop reason: HOSPADM

## 2023-05-17 RX ORDER — DICYCLOMINE HYDROCHLORIDE 10 MG/1
20 CAPSULE ORAL ONCE
Status: COMPLETED | OUTPATIENT
Start: 2023-05-17 | End: 2023-05-17

## 2023-05-17 RX ORDER — ATORVASTATIN CALCIUM 40 MG/1
40 TABLET, FILM COATED ORAL DAILY
Status: DISCONTINUED | OUTPATIENT
Start: 2023-05-18 | End: 2023-05-19 | Stop reason: HOSPADM

## 2023-05-17 RX ORDER — PANTOPRAZOLE SODIUM 40 MG/10ML
40 INJECTION, POWDER, LYOPHILIZED, FOR SOLUTION INTRAVENOUS ONCE
Status: COMPLETED | OUTPATIENT
Start: 2023-05-17 | End: 2023-05-17

## 2023-05-17 RX ADMIN — HEPARIN SODIUM 5000 UNITS: 5000 INJECTION INTRAVENOUS; SUBCUTANEOUS at 21:42

## 2023-05-17 RX ADMIN — SODIUM BICARBONATE 75 MEQ: 84 INJECTION INTRAVENOUS at 20:37

## 2023-05-17 RX ADMIN — HYDROXYZINE HYDROCHLORIDE 25 MG: 25 TABLET, FILM COATED ORAL at 22:31

## 2023-05-17 RX ADMIN — PANTOPRAZOLE SODIUM 40 MG: 40 INJECTION, POWDER, FOR SOLUTION INTRAVENOUS at 17:44

## 2023-05-17 RX ADMIN — SODIUM CHLORIDE 1000 ML: 9 INJECTION, SOLUTION INTRAVENOUS at 17:44

## 2023-05-17 RX ADMIN — SODIUM CHLORIDE 1000 ML: 9 INJECTION, SOLUTION INTRAVENOUS at 19:31

## 2023-05-17 RX ADMIN — PRAMIPEXOLE DIHYDROCHLORIDE 0.25 MG: 0.12 TABLET ORAL at 23:44

## 2023-05-17 RX ADMIN — DICYCLOMINE HYDROCHLORIDE 20 MG: 10 CAPSULE ORAL at 17:44

## 2023-05-17 NOTE — ED PROVIDER NOTES
Subjective   History of Present Illness  47-year-old female with past medical history of diabetes and hypertension presents to the ER due to concerns for persistent diarrhea, nausea, and vomiting.  Symptoms been present for the last 3 to 4 days.  No hematemesis or hematochezia.  No changes in bowel habits.  Decreased urinary output noted over the last 12 to 24 hours.  No chest pain.  No shortness of breath.  No obvious aggravating or alleviating factors.  Vital signs stable.        Review of Systems   Gastrointestinal: Positive for diarrhea, nausea and vomiting.   All other systems reviewed and are negative.      Past Medical History:   Diagnosis Date   • Anxiety    • Depression    • Diabetes mellitus    • History of pulmonary embolus (PE)    • Hypertension    • Hypothyroidism        Allergies   Allergen Reactions   • Codeine    • Prozac [Fluoxetine Hcl]        Past Surgical History:   Procedure Laterality Date   • CHOLECYSTECTOMY     • EYE SURGERY     • PULMONARY EMBOLISM SURGERY         Family History   Problem Relation Age of Onset   • Stroke Mother    • Diabetes Father    • Heart disease Father    • Hypertension Father        Social History     Socioeconomic History   • Marital status:    Tobacco Use   • Smoking status: Never   • Smokeless tobacco: Never   Vaping Use   • Vaping Use: Never used   Substance and Sexual Activity   • Alcohol use: No   • Drug use: No   • Sexual activity: Defer           Objective   Physical Exam  Constitutional:       General: She is not in acute distress.     Appearance: Normal appearance. She is not ill-appearing.   HENT:      Head: Normocephalic and atraumatic.      Right Ear: External ear normal.      Left Ear: External ear normal.      Nose: Nose normal.      Mouth/Throat:      Mouth: Mucous membranes are moist.   Eyes:      Extraocular Movements: Extraocular movements intact.      Pupils: Pupils are equal, round, and reactive to light.   Cardiovascular:      Rate and  Rhythm: Normal rate and regular rhythm.      Heart sounds: No murmur heard.  Pulmonary:      Effort: Pulmonary effort is normal. No respiratory distress.      Breath sounds: Normal breath sounds. No wheezing.   Abdominal:      General: Bowel sounds are normal.      Palpations: Abdomen is soft.      Tenderness: There is no abdominal tenderness.   Musculoskeletal:         General: No deformity or signs of injury. Normal range of motion.      Cervical back: Normal range of motion and neck supple.   Skin:     General: Skin is warm and dry.      Findings: No erythema.   Neurological:      General: No focal deficit present.      Mental Status: She is alert and oriented to person, place, and time. Mental status is at baseline.      Cranial Nerves: No cranial nerve deficit.   Psychiatric:         Mood and Affect: Mood normal.         Behavior: Behavior normal.         Thought Content: Thought content normal.         Procedures           ED Course      CT Abdomen Pelvis Without Contrast    Result Date: 5/17/2023  No acute process in the abdomen or pelvis. Right ovarian cystic structure measuring 2.6 cm.  This report was finalized on 5/17/2023 6:14 PM by Alex Pallas, DO.        Results for orders placed or performed during the hospital encounter of 05/17/23   Comprehensive Metabolic Panel    Specimen: Blood   Result Value Ref Range    Glucose 183 (H) 65 - 99 mg/dL    BUN 40 (H) 6 - 20 mg/dL    Creatinine 3.66 (H) 0.57 - 1.00 mg/dL    Sodium 135 (L) 136 - 145 mmol/L    Potassium 4.2 3.5 - 5.2 mmol/L    Chloride 100 98 - 107 mmol/L    CO2 19.2 (L) 22.0 - 29.0 mmol/L    Calcium 9.7 8.6 - 10.5 mg/dL    Total Protein 8.7 (H) 6.0 - 8.5 g/dL    Albumin 4.6 3.5 - 5.2 g/dL    ALT (SGPT) 40 (H) 1 - 33 U/L    AST (SGOT) 32 1 - 32 U/L    Alkaline Phosphatase 97 39 - 117 U/L    Total Bilirubin 0.4 0.0 - 1.2 mg/dL    Globulin 4.1 gm/dL    A/G Ratio 1.1 g/dL    BUN/Creatinine Ratio 10.9 7.0 - 25.0    Anion Gap 15.8 (H) 5.0 - 15.0 mmol/L     eGFR 14.6 (L) >60.0 mL/min/1.73   C-reactive Protein    Specimen: Blood   Result Value Ref Range    C-Reactive Protein 1.46 (H) 0.00 - 0.50 mg/dL   Sedimentation Rate    Specimen: Blood   Result Value Ref Range    Sed Rate 15 0 - 20 mm/hr   T4, Free    Specimen: Blood   Result Value Ref Range    Free T4 1.66 0.93 - 1.70 ng/dL   TSH    Specimen: Blood   Result Value Ref Range    TSH 6.200 (H) 0.270 - 4.200 uIU/mL   Magnesium    Specimen: Blood   Result Value Ref Range    Magnesium 2.2 1.6 - 2.6 mg/dL   CK    Specimen: Blood   Result Value Ref Range    Creatine Kinase 185 (H) 20 - 180 U/L   CBC Auto Differential    Specimen: Blood   Result Value Ref Range    WBC 12.18 (H) 3.40 - 10.80 10*3/mm3    RBC 5.69 (H) 3.77 - 5.28 10*6/mm3    Hemoglobin 16.3 (H) 12.0 - 15.9 g/dL    Hematocrit 48.7 (H) 34.0 - 46.6 %    MCV 85.6 79.0 - 97.0 fL    MCH 28.6 26.6 - 33.0 pg    MCHC 33.5 31.5 - 35.7 g/dL    RDW 13.2 12.3 - 15.4 %    RDW-SD 41.1 37.0 - 54.0 fl    MPV 8.8 6.0 - 12.0 fL    Platelets 378 140 - 450 10*3/mm3    Neutrophil % 61.2 42.7 - 76.0 %    Lymphocyte % 26.0 19.6 - 45.3 %    Monocyte % 11.7 5.0 - 12.0 %    Eosinophil % 0.5 0.3 - 6.2 %    Basophil % 0.3 0.0 - 1.5 %    Immature Grans % 0.3 0.0 - 0.5 %    Neutrophils, Absolute 7.45 (H) 1.70 - 7.00 10*3/mm3    Lymphocytes, Absolute 3.17 (H) 0.70 - 3.10 10*3/mm3    Monocytes, Absolute 1.42 (H) 0.10 - 0.90 10*3/mm3    Eosinophils, Absolute 0.06 0.00 - 0.40 10*3/mm3    Basophils, Absolute 0.04 0.00 - 0.20 10*3/mm3    Immature Grans, Absolute 0.04 0.00 - 0.05 10*3/mm3    nRBC 0.0 0.0 - 0.2 /100 WBC                                          Medical Decision Making  CBC unremarkable.  CMP notes an elevated creatinine of 3.0.  Previous creatinine normal.  CT imaging of the abdomen pelvis noted no acute abnormality.  Patient received IV fluid resuscitation.  Concerns for dehydration and acute renal failure.  Recommend admission for further work up and treatment.  Hospitalist team  consulted and made aware of the patient.  Consults and orders placed per hospitalist request.  Patient was agreeable to admission plan.  Vitals stable on admission.    Acute renal failure, unspecified acute renal failure type: complicated acute illness or injury  Dehydration: complicated acute illness or injury  Amount and/or Complexity of Data Reviewed  Labs: ordered. Decision-making details documented in ED Course.  Radiology: ordered. Decision-making details documented in ED Course.      Risk  Prescription drug management.  Decision regarding hospitalization.          Final diagnoses:   Acute renal failure, unspecified acute renal failure type   Dehydration       ED Disposition  ED Disposition     ED Disposition   Decision to Admit    Condition   --    Comment   Level of Care: Telemetry [5]   Diagnosis: JESÚS (acute kidney injury) [372645]   Certification: I Certify That Inpatient Hospital Services Are Medically Necessary For Greater Than 2 Midnights               No follow-up provider specified.       Medication List      No changes were made to your prescriptions during this visit.          Brett Butts,   05/17/23 0180

## 2023-05-17 NOTE — ED NOTES
MEDICAL SCREENING:    Reason for Visit: Nausea/Vomiting/Diarrhea    Patient initially seen in triage.  The patient was advised further evaluation and diagnostic testing will be needed, some of the treatment and testing will be initiated in the lobby in order to begin the process.  The patient will be returned to the waiting area for the time being and possibly be re-assessed by a subsequent ED provider.  The patient will be brought back to the treatment area in as timely manner as possible.       Fernando Bellamy MD  05/17/23 [id number]

## 2023-05-18 LAB
ADV 40+41 DNA STL QL NAA+NON-PROBE: NOT DETECTED
ANION GAP SERPL CALCULATED.3IONS-SCNC: 12.3 MMOL/L (ref 5–15)
ASTRO TYP 1-8 RNA STL QL NAA+NON-PROBE: NOT DETECTED
BUN SERPL-MCNC: 40 MG/DL (ref 6–20)
BUN/CREAT SERPL: 14.9 (ref 7–25)
C CAYETANENSIS DNA STL QL NAA+NON-PROBE: NOT DETECTED
C COLI+JEJ+UPSA DNA STL QL NAA+NON-PROBE: NOT DETECTED
CALCIUM SPEC-SCNC: 8.1 MG/DL (ref 8.6–10.5)
CHLORIDE SERPL-SCNC: 103 MMOL/L (ref 98–107)
CO2 SERPL-SCNC: 17.7 MMOL/L (ref 22–29)
CREAT SERPL-MCNC: 2.69 MG/DL (ref 0.57–1)
CREAT UR-MCNC: 369.6 MG/DL
CRYPTOSP DNA STL QL NAA+NON-PROBE: NOT DETECTED
D-LACTATE SERPL-SCNC: 1.7 MMOL/L (ref 0.5–2)
DEPRECATED RDW RBC AUTO: 41.2 FL (ref 37–54)
E HISTOLYT DNA STL QL NAA+NON-PROBE: NOT DETECTED
EAEC PAA PLAS AGGR+AATA ST NAA+NON-PRB: DETECTED
EC STX1+STX2 GENES STL QL NAA+NON-PROBE: NOT DETECTED
EGFRCR SERPLBLD CKD-EPI 2021: 21.1 ML/MIN/1.73
EPEC EAE GENE STL QL NAA+NON-PROBE: NOT DETECTED
ERYTHROCYTE [DISTWIDTH] IN BLOOD BY AUTOMATED COUNT: 13.2 % (ref 12.3–15.4)
ETEC LTA+ST1A+ST1B TOX ST NAA+NON-PROBE: NOT DETECTED
G LAMBLIA DNA STL QL NAA+NON-PROBE: NOT DETECTED
GLUCOSE BLDC GLUCOMTR-MCNC: 115 MG/DL (ref 70–130)
GLUCOSE BLDC GLUCOMTR-MCNC: 116 MG/DL (ref 70–130)
GLUCOSE BLDC GLUCOMTR-MCNC: 120 MG/DL (ref 70–130)
GLUCOSE BLDC GLUCOMTR-MCNC: 72 MG/DL (ref 70–130)
GLUCOSE SERPL-MCNC: 100 MG/DL (ref 65–99)
HCT VFR BLD AUTO: 38.9 % (ref 34–46.6)
HGB BLD-MCNC: 12.9 G/DL (ref 12–15.9)
HYPOCHROMIA BLD QL: ABNORMAL
LYMPHOCYTES # BLD MANUAL: 2.19 10*3/MM3 (ref 0.7–3.1)
LYMPHOCYTES NFR BLD MANUAL: 5 % (ref 5–12)
MAGNESIUM SERPL-MCNC: 1.8 MG/DL (ref 1.6–2.6)
MCH RBC QN AUTO: 28.4 PG (ref 26.6–33)
MCHC RBC AUTO-ENTMCNC: 33.2 G/DL (ref 31.5–35.7)
MCV RBC AUTO: 85.7 FL (ref 79–97)
MONOCYTES # BLD: 0.48 10*3/MM3 (ref 0.1–0.9)
NEUTROPHILS # BLD AUTO: 6.84 10*3/MM3 (ref 1.7–7)
NEUTROPHILS NFR BLD MANUAL: 66 % (ref 42.7–76)
NEUTS BAND NFR BLD MANUAL: 6 % (ref 0–5)
NOROVIRUS GI+II RNA STL QL NAA+NON-PROBE: NOT DETECTED
P SHIGELLOIDES DNA STL QL NAA+NON-PROBE: NOT DETECTED
PLAT MORPH BLD: NORMAL
PLATELET # BLD AUTO: 262 10*3/MM3 (ref 140–450)
PMV BLD AUTO: 9.1 FL (ref 6–12)
POTASSIUM SERPL-SCNC: 3.5 MMOL/L (ref 3.5–5.2)
PROT ?TM UR-MCNC: 121.9 MG/DL
PROT/CREAT UR: 329.8 MG/G CREA (ref 0–200)
RBC # BLD AUTO: 4.54 10*6/MM3 (ref 3.77–5.28)
RVA RNA STL QL NAA+NON-PROBE: DETECTED
S ENT+BONG DNA STL QL NAA+NON-PROBE: NOT DETECTED
SAPO I+II+IV+V RNA STL QL NAA+NON-PROBE: NOT DETECTED
SHIGELLA SP+EIEC IPAH ST NAA+NON-PROBE: NOT DETECTED
SODIUM SERPL-SCNC: 133 MMOL/L (ref 136–145)
V CHOL+PARA+VUL DNA STL QL NAA+NON-PROBE: NOT DETECTED
V CHOLERAE DNA STL QL NAA+NON-PROBE: NOT DETECTED
VARIANT LYMPHS NFR BLD MANUAL: 23 % (ref 19.6–45.3)
WBC NRBC COR # BLD: 9.5 10*3/MM3 (ref 3.4–10.8)
Y ENTEROCOL DNA STL QL NAA+NON-PROBE: NOT DETECTED

## 2023-05-18 PROCEDURE — 0 MAGNESIUM SULFATE 4 GM/100ML SOLUTION: Performed by: STUDENT IN AN ORGANIZED HEALTH CARE EDUCATION/TRAINING PROGRAM

## 2023-05-18 PROCEDURE — 25010000002 HEPARIN (PORCINE) PER 1000 UNITS: Performed by: STUDENT IN AN ORGANIZED HEALTH CARE EDUCATION/TRAINING PROGRAM

## 2023-05-18 PROCEDURE — 82948 REAGENT STRIP/BLOOD GLUCOSE: CPT

## 2023-05-18 PROCEDURE — 25010000002 CEFTRIAXONE PER 250 MG: Performed by: STUDENT IN AN ORGANIZED HEALTH CARE EDUCATION/TRAINING PROGRAM

## 2023-05-18 PROCEDURE — 85007 BL SMEAR W/DIFF WBC COUNT: CPT | Performed by: STUDENT IN AN ORGANIZED HEALTH CARE EDUCATION/TRAINING PROGRAM

## 2023-05-18 PROCEDURE — 99233 SBSQ HOSP IP/OBS HIGH 50: CPT | Performed by: STUDENT IN AN ORGANIZED HEALTH CARE EDUCATION/TRAINING PROGRAM

## 2023-05-18 PROCEDURE — 83605 ASSAY OF LACTIC ACID: CPT | Performed by: STUDENT IN AN ORGANIZED HEALTH CARE EDUCATION/TRAINING PROGRAM

## 2023-05-18 PROCEDURE — 87040 BLOOD CULTURE FOR BACTERIA: CPT | Performed by: STUDENT IN AN ORGANIZED HEALTH CARE EDUCATION/TRAINING PROGRAM

## 2023-05-18 PROCEDURE — 87507 IADNA-DNA/RNA PROBE TQ 12-25: CPT | Performed by: STUDENT IN AN ORGANIZED HEALTH CARE EDUCATION/TRAINING PROGRAM

## 2023-05-18 PROCEDURE — 85025 COMPLETE CBC W/AUTO DIFF WBC: CPT | Performed by: STUDENT IN AN ORGANIZED HEALTH CARE EDUCATION/TRAINING PROGRAM

## 2023-05-18 PROCEDURE — 80048 BASIC METABOLIC PNL TOTAL CA: CPT | Performed by: STUDENT IN AN ORGANIZED HEALTH CARE EDUCATION/TRAINING PROGRAM

## 2023-05-18 PROCEDURE — 83735 ASSAY OF MAGNESIUM: CPT | Performed by: STUDENT IN AN ORGANIZED HEALTH CARE EDUCATION/TRAINING PROGRAM

## 2023-05-18 RX ORDER — MAGNESIUM SULFATE HEPTAHYDRATE 40 MG/ML
4 INJECTION, SOLUTION INTRAVENOUS ONCE
Status: COMPLETED | OUTPATIENT
Start: 2023-05-18 | End: 2023-05-18

## 2023-05-18 RX ADMIN — HEPARIN SODIUM 5000 UNITS: 5000 INJECTION INTRAVENOUS; SUBCUTANEOUS at 21:08

## 2023-05-18 RX ADMIN — SODIUM BICARBONATE 75 MEQ: 84 INJECTION INTRAVENOUS at 10:44

## 2023-05-18 RX ADMIN — HYDROXYZINE HYDROCHLORIDE 25 MG: 25 TABLET, FILM COATED ORAL at 21:19

## 2023-05-18 RX ADMIN — HEPARIN SODIUM 5000 UNITS: 5000 INJECTION INTRAVENOUS; SUBCUTANEOUS at 05:14

## 2023-05-18 RX ADMIN — PRAMIPEXOLE DIHYDROCHLORIDE 0.25 MG: 0.12 TABLET ORAL at 21:08

## 2023-05-18 RX ADMIN — PANTOPRAZOLE SODIUM 40 MG: 40 TABLET, DELAYED RELEASE ORAL at 05:14

## 2023-05-18 RX ADMIN — MAGNESIUM SULFATE HEPTAHYDRATE 4 G: 40 INJECTION, SOLUTION INTRAVENOUS at 10:10

## 2023-05-18 RX ADMIN — CEFTRIAXONE 1 G: 1 INJECTION, POWDER, FOR SOLUTION INTRAMUSCULAR; INTRAVENOUS at 09:09

## 2023-05-18 RX ADMIN — ATORVASTATIN CALCIUM 40 MG: 40 TABLET, FILM COATED ORAL at 08:53

## 2023-05-18 RX ADMIN — Medication 10 ML: at 21:13

## 2023-05-18 RX ADMIN — HEPARIN SODIUM 5000 UNITS: 5000 INJECTION INTRAVENOUS; SUBCUTANEOUS at 16:37

## 2023-05-18 RX ADMIN — SODIUM BICARBONATE 75 MEQ: 84 INJECTION INTRAVENOUS at 03:43

## 2023-05-18 RX ADMIN — Medication 10 ML: at 08:53

## 2023-05-18 RX ADMIN — LEVOTHYROXINE SODIUM 75 MCG: 0.07 TABLET ORAL at 08:52

## 2023-05-18 NOTE — PLAN OF CARE
Goal Outcome Evaluation:  Plan of Care Reviewed With: patient, significant other  Progress: no change   Pt resting in bed, watching television. Pt has tolerated all interventions. No complaints/concerns. No acute distress noted. Will continue to follow the plan of care.

## 2023-05-18 NOTE — PLAN OF CARE
Goal Outcome Evaluation:   Pt admitted to [facility] over night. No indicators of acute distress noted. VSS on RA, SR on the monitor. Call light within reach.

## 2023-05-18 NOTE — H&P
[facility]   HISTORY AND PHYSICAL    Patient Name: Arabella Rose  : 1975  MRN: [id number]  Primary Care Physician:  Ascencion, No Known  Date of admission: 2023    Subjective   Subjective     Chief Complaint: Nausea, vomiting and diarrhea    History of Present Illness the patient is a 47-year-old female with past medical history significant for type II non-insulin-dependent diabetes mellitus, history of PE, hypertension, anxiety/depression, hypothyroidism, GERD and hyperlipidemia who presents to the emergency department for the above-mentioned complaints.    The patient was seen and examined in .  The patient states that her symptoms started on .  The patient states that several children and other family members had similar symptoms.  She reported subjective fever earlier during the course of her illness but states that they have resolved.    She reports nausea, vomiting and diarrhea.  She denies any hematemesis.  She denies abdominal pain.  She states that her diarrhea is watery and denies any hematochezia or melena.    The patient reports a poor appetite and inability to eat.  The patient states that she noted decreased urine output today.  She reports that she takes lisinopril at home.  She states that she has taken only one 200 mg tablet of ibuprofen during the course of her illness.  No alcohol use.    Review of Systems   Constitutional: Positive for appetite change, fatigue and fever. Negative for chills.   HENT: Negative for dental problem, hearing loss, rhinorrhea and sore throat.    Eyes: Negative for visual disturbance.   Respiratory: Negative for cough and shortness of breath.    Cardiovascular: Negative for chest pain.   Gastrointestinal: Positive for diarrhea, nausea and vomiting. Negative for abdominal pain and constipation.   Endocrine: Negative for polyuria.   Genitourinary: Positive for decreased urine volume. Negative for dysuria.        Personal History     Past  Medical History:   Diagnosis Date   • Anxiety    • Depression    • Diabetes mellitus    • History of pulmonary embolus (PE)    • Hypertension    • Hypothyroidism        Past Surgical History:   Procedure Laterality Date   • CHOLECYSTECTOMY     • EYE SURGERY     • PULMONARY EMBOLISM SURGERY         Family History: family history includes Diabetes in her father; Heart disease in her father; Hypertension in her father; Stroke in her mother. Otherwise pertinent FHx was reviewed and not pertinent to current issue.    Social History:  reports that she has never smoked. She has never used smokeless tobacco. She reports that she does not drink alcohol and does not use drugs.    Home Medications:  atorvastatin, busPIRone, hydrOXYzine pamoate, levothyroxine, lisinopril, metFORMIN, metoprolol succinate XL, mirtazapine, and omeprazole    Allergies:  Allergies   Allergen Reactions   • Codeine    • Prozac [Fluoxetine Hcl]        Objective    Objective     Vitals:   Temp:  [97.1 °F (36.2 °C)] 97.1 °F (36.2 °C)  Heart Rate:  [86] 86  Resp:  [18] 18  BP: (108)/(62) 108/62    Physical Exam  Constitutional:       General: She is not in acute distress.     Appearance: She is well-developed.   HENT:      Head: Normocephalic and atraumatic.   Eyes:      Conjunctiva/sclera: Conjunctivae normal.   Neck:      Trachea: No tracheal deviation.   Cardiovascular:      Rate and Rhythm: Normal rate and regular rhythm.      Pulses:           Posterior tibial pulses are 2+ on the right side and 2+ on the left side.      Heart sounds: No murmur heard.    No friction rub. No gallop.   Pulmonary:      Effort: No respiratory distress.      Breath sounds: Normal breath sounds. No wheezing or rales.   Abdominal:      General: Bowel sounds are decreased. There is no distension.      Palpations: Abdomen is soft.      Tenderness: There is no abdominal tenderness. There is no guarding.   Musculoskeletal:         General: No tenderness. Normal range of motion.    Skin:     General: Skin is warm and dry.      Findings: No erythema or rash.   Neurological:      Mental Status: She is alert and oriented to person, place, and time.      Cranial Nerves: No cranial nerve deficit.         Result Review    Result Review:  I have personally reviewed the results from the time of this admission to 5/17/2023 20:01 EDT and agree with these findings:  [x]  Laboratory list / accordion  []  Microbiology  [x]  Radiology  [x]  EKG/Telemetry   []  Cardiology/Vascular   []  Pathology  []  Old records  []  Other:  Most notable findings include:     Results from last 7 days   Lab Units 05/17/23  1657   WBC 10*3/mm3 12.18*   HEMOGLOBIN g/dL 16.3*   HEMATOCRIT % 48.7*   PLATELETS 10*3/mm3 378     Results from last 7 days   Lab Units 05/17/23  1657   SODIUM mmol/L 135*   POTASSIUM mmol/L 4.2   CHLORIDE mmol/L 100   CO2 mmol/L 19.2*   BUN mg/dL 40*   CREATININE mg/dL 3.66*   CALCIUM mg/dL 9.7   BILIRUBIN mg/dL 0.4   ALK PHOS U/L 97   ALT (SGPT) U/L 40*   AST (SGOT) U/L 32   GLUCOSE mg/dL 183*     CT abdomen/pelvis without contrast:  FINDINGS:  Visualized lung bases appear unremarkable.     The liver, spleen, pancreas and adrenal glands appear unremarkable. No  renal calculus or hydronephrosis. Gallbladder is surgically absent.     No evidence of bowel obstruction/colitis/appendicitis. No free air. No  drainable fluid collection. Prominent, nonspecific mesenteric lymph  nodes.     The urinary bladder appears unremarkable. Right ovarian cystic structure  measuring 2.6 cm. No acute osseous abnormality evident.     IMPRESSION:  No acute process in the abdomen or pelvis. Right ovarian cystic  structure measuring 2.6 cm.     Assessment / Plan     Brief Patient Summary:  Arabella Rose is a 47 y.o. female who has PMHx significant for NIDDM type II, HTN, Hypothyroidism, Hx of PE and GERD who presents to the emergency department with a 3-day history of nausea vomiting and diarrhea; work-up in the  emergency department has revealed acute kidney injury.     #Acute kidney injury, suspect prerenal/hypovolemic in the setting of decreased oral intake and GI losses, POA  #Anion gap metabolic acidosis likely due to above  #Probable acute viral gastroenteritis  #Noninsulin-dependent type 2 diabetes mellitus with hyperglycemia  #Erythrocytosis likely due to volume depletion/dehydration  #Mild leukocytosis  #Mild ALT elevation  #Mild TSH elevation with normal free T4  #Incidentally noted right ovarian cystic structure  #Morbid obesity per BMI 48.06 kg/m², complicates all aspects of care  -Patient has been admitted to the telemetry unit  -Renal failure likely due to dehydration and poor oral intake in the setting of GI losses  -CK level not significantly elevated  -No obstruction noted on admission CT  -Patient has been started on a sodium bicarbonate infusion; plan to continue for now  -Repeat chemistry panel and CBC in a.m.  -Antibiotic therapy not felt to be indicated at this time  -Hold nephrotoxic medications; per review of patient's home medication list which has currently not been reconciled by pharmacy, I will plan to hold her lisinopril  -Hold metformin for now  -Accu-Cheks and the hypoglycemia protocol; can add low-dose sliding scale insulin as needed    Chronic medical conditions:  -Hypothyroidism with mild elevation of TSH at 6.2: Plan to resume levothyroxine  -GERD: Plan to resume PPI  -Hyperlipidemia: Plan to resume medications as indicated  -Anxiety/Depression: Plan to resume home medications as indicated after completion of her home medication list  -Reported hx of PE; details unknown    DVT prophylaxis:  [facility]    CODE STATUS:    Code Status (Patient has no pulse and is not breathing): CPR (Attempt to Resuscitate)  Medical Interventions (Patient has pulse or is breathing): Full Support    Admission Status:  I believe this patient meets inpatient status.    Darlene Orozco, DO

## 2023-05-18 NOTE — PROGRESS NOTES
[facility] HOSPITALIST PROGRESS NOTE     Patient Identification:  Name:  Arabella Rose  Age:  47 y.o.  Sex:  female  :  1975  MRN:  [id number]  Visit Number:  [id number]  ROOM: [address] [place] [address]     Primary Care Provider:  Provider, No Known    Length of stay in inpatient status:  1    Subjective     Chief Compliant:    Chief Complaint   Patient presents with   • Vomiting   • Diarrhea       History of Presenting Illness:    Patient seen in follow-up for nausea/vomiting and JESÚS.  Patient is awake, alert and oriented x3.  Says she feels much better.  States diarrhea has slowed down and stool starting to be formed.  Afebrile overnight.  Tolerating oral intake.  No adverse events noted.    Objective     Current Hospital Meds:atorvastatin, 40 mg, Oral, Daily  cefTRIAXone, 1 g, Intravenous, Q24H  heparin (porcine), 5,000 Units, Subcutaneous, Q8H  levothyroxine, 75 mcg, Oral, Daily  magnesium sulfate, 4 g, Intravenous, Once  metoprolol succinate XL, 50 mg, Oral, Daily  pantoprazole, 40 mg, Oral, Q AM  Pharmacy Meds to Bed Consult, , Does not apply, Daily  pramipexole, 0.25 mg, Oral, Nightly  senna-docusate sodium, 2 tablet, Oral, BID  sodium chloride, 10 mL, Intravenous, Q12H    sodium bicarbonate drip (greater than 75 mEq/bag), 75 mEq, Last Rate: 75 mEq (23 1044)        Current Antimicrobial Therapy:  Anti-Infectives (From admission, onward)    Ordered     Dose/Rate Route Frequency Start Stop    23 0811  cefTRIAXone (ROCEPHIN) 1 g in sodium chloride 0.9 % 100 mL IVPB-VTB        Ordering Provider: Max Bullard,     1 g  200 mL/hr over 30 Minutes Intravenous Every 24 Hours 23 0900 23 0859        Current Diuretic Therapy:  Diuretics (From admission, onward)    None        ----------------------------------------------------------------------------------------------------------------------  Vital Signs:  Temp:  [97.1 °F (36.2 °C)-98.6 °F (37 °C)] 98 °F (36.7 °C)  Heart  Rate:  [68-88] 80  Resp:  [18] 18  BP: ()/(47-69) 111/64  SpO2:  [95 %-99 %] 99 %  on   ;   Device (Oxygen Therapy): room air  Body mass index is 47.29 kg/m².    Wt Readings from Last 3 Encounters:   05/18/23 125 kg (275 lb 8 oz)   02/12/23 124 kg (273 lb)   07/19/22 120 kg (264 lb)     Intake & Output (last 3 days)       05/15 0701 05/16 0700 05/16 0701  05/17 0700 05/17 0701 05/18 0700 05/18 0701  05/19 0700    P.O.   240     IV Piggyback   1000     Total Intake(mL/kg)   1240 (9.9)     Urine (mL/kg/hr)   330     Total Output   330     Net   +910                 Diet: Cardiac Diets, Diabetic Diets, Renal Diets; Healthy Heart (2-3 Na+); Consistent Carbohydrate; Low Sodium (2-3g), Low Potassium, Low Phosphorus; Texture: Regular Texture (IDDSI 7); Fluid Consistency: Thin (IDDSI 0)  ----------------------------------------------------------------------------------------------------------------------  Physical exam:  Constitutional: Older female, nontoxic, Well-developed and well-nourished, resting comfortably in bed, no acute distress.      HENT:  Head:  Normocephalic and atraumatic.  Mouth:  Moist mucous membranes.    Eyes:  Conjunctivae and EOM are normal. No scleral icterus.   Cardiovascular:  Normal rate, regular rhythm and normal heart sounds with no murmur. No JVD.   Pulmonary/Chest:  No respiratory distress, no wheezes, no crackles, with normal breath sounds and good air movement. Unlabored. No accessory muscle use.  Abdominal:  Soft. No distension and no tenderness.  Bowel sounds present. No rebound or guarding.   Musculoskeletal:  No tenderness, and no deformity.  No red or swollen joints anywhere.    Neurological:  Alert and oriented to person, place, and time.  No cranial nerve deficit.   Nonfocal.   Skin:  Skin is warm and dry. No rash noted. No pallor.   Peripheral vascular:  No clubbing, no cyanosis, no edema. Pedal and tibial pulses 2 out of 4 bilaterally.      ----------------------------------------------------------------------------------------------------------------------  Results from last 7 days   Lab Units 05/18/23  0832 05/18/23  0128 05/17/23  1657   CRP mg/dL  --   --  1.46*   LACTATE mmol/L 1.7  --   --    WBC 10*3/mm3  --  9.50 12.18*   HEMOGLOBIN g/dL  --  12.9 16.3*   HEMATOCRIT %  --  38.9 48.7*   MCV fL  --  85.7 85.6   MCHC g/dL  --  33.2 33.5   PLATELETS 10*3/mm3  --  262 378         Results from last 7 days   Lab Units 05/18/23  0128 05/17/23  1657   SODIUM mmol/L 133* 135*   POTASSIUM mmol/L 3.5 4.2   MAGNESIUM mg/dL 1.8 2.2   CHLORIDE mmol/L 103 100   CO2 mmol/L 17.7* 19.2*   BUN mg/dL 40* 40*   CREATININE mg/dL 2.69* 3.66*   CALCIUM mg/dL 8.1* 9.7   GLUCOSE mg/dL 100* 183*   ALBUMIN g/dL  --  4.6   BILIRUBIN mg/dL  --  0.4   ALK PHOS U/L  --  97   AST (SGOT) U/L  --  32   ALT (SGPT) U/L  --  40*   Estimated Creatinine Clearance: 33.1 mL/min (A) (by C-G formula based on SCr of 2.69 mg/dL (H)).  No results found for: AMMONIA  Results from last 7 days   Lab Units 05/17/23  1657   CK TOTAL U/L 185*             Glucose   Date/Time Value Ref Range Status   05/18/2023 1014 115 70 - 130 mg/dL Final     Comment:     Meter: [id number] : [id number] RAYMOND LYNN   05/18/2023 0614 72 70 - 130 mg/dL Final     Comment:     Meter: [id number] : [id number] RAYMOND BAILEY   05/17/2023 2034 119 70 - 130 mg/dL Final     Comment:     Meter: [id number] : [id number] TIMUR LAL     Lab Results   Component Value Date    TSH 6.200 (H) 05/17/2023    FREET4 1.66 05/17/2023     Lab Results   Component Value Date    PREGTESTUR Negative 02/14/2022     Pain Management Panel         Latest Ref Rng & Units 10/21/2021 2/27/2017   Pain Management Panel   Amphetamine, Urine Qual Negative Negative   Negative     Barbiturates Screen, Urine Negative Negative   Negative     Benzodiazepine Screen, Urine Negative Negative   Negative     Buprenorphine, Screen, Urine Negative Negative       Cocaine Screen, Urine Negative Negative   Negative     Methadone Screen , Urine Negative Negative   Negative     Methamphetamine, Ur Negative Negative                 Brief Urine Lab Results  (Last result in the past 365 days)      Color   Clarity   Blood   Leuk Est   Nitrite   Protein   CREAT   Urine HCG        05/17/23 2143 Dark Yellow   Turbid   Large (3+)   Small (1+)   Positive   100 mg/dL (2+)               No results found for: BLOODCX  No results found for: URINECX  No results found for: WOUNDCX  No results found for: STOOLCX  No results found for: RESPCX  No results found for: AFBCX  Results from last 7 days   Lab Units 05/18/23  0832 05/17/23  1657   LACTATE mmol/L 1.7  --    SED RATE mm/hr  --  15   CRP mg/dL  --  1.46*       I have personally looked at the labs and they are summarized above.  ----------------------------------------------------------------------------------------------------------------------  Detailed radiology reports for the last 24 hours:  Imaging Results (Last 24 Hours)     Procedure Component Value Units Date/Time    CT Abdomen Pelvis Without Contrast [[id number]] Collected: 05/17/23 1811     Updated: 05/17/23 1816    Narrative:      INDICATION: Abdominal pain.     COMPARISON: No relevant priors available     TECHNIQUE: Axial CT images of the abdomen and pelvis were obtained  without IV contrast administration. Coronal and sagittal reformations  were reviewed.     FINDINGS:  Visualized lung bases appear unremarkable.     The liver, spleen, pancreas and adrenal glands appear unremarkable. No  renal calculus or hydronephrosis. Gallbladder is surgically absent.     No evidence of bowel obstruction/colitis/appendicitis. No free air. No  drainable fluid collection. Prominent, nonspecific mesenteric lymph  nodes.     The urinary bladder appears unremarkable. Right ovarian cystic structure  measuring 2.6 cm. No acute osseous abnormality evident.       Impression:      No acute process  in the abdomen or pelvis. Right ovarian cystic  structure measuring 2.6 cm.      This report was finalized on 5/17/2023 6:14 PM by Alex Pallas, DO.           Assessment & Plan      Patient is a 47-year-old female with history significant for DIOMEDES/depression, type 2 diabetes mellitus and history of PE who presented to the ER with complaints of persistent nausea/vomiting/diarrhea 4-40 8 to 72 hours.    #Acute kidney injury with ATN due to prerenal azotemia/GI loss  #Proteinuria/hematuria  #Suspected viral gastroenteritis  #Possible UTI, POA  #High anion gap metabolic acidosis due to JESÚS  #Hemoconcentration due to volume depletion  #Intractable nausea/vomiting  #Right ovarian cyst, 2.6 cm  #Hypothyroidism    --Patient admitted with 48 to 72-hour history of nausea/vomiting with diarrhea episodes.  No history of CKD.  Serum acetone negative.  Lactic acid normal.  -- Admit labs noted hemoconcentration due to volume depletion (hemoglobin 16), BUN/creatinine 40/3.6, anion gap of 16 with a serum bicarb of 19,   --CT abdomen and pelvis negative for obstruction  --Check urine protein/creatinine ratio  --Received 2 L IV fluid bolus, will continue sodium bicarb/half-normal saline  --As needed antiemetics  --Send GI PCR stool to rule out Campylobacter  --Hold metformin, avoid ACE/ARB's, NSAIDs  --Given JESÚS/urinalysis with symptoms, will treat with Rocephin x3 days  --Continue monitoring on telemetry, if renal function continues to improve at this rate on a.m. labs and she is able to tolerate p.o. intake, likely DC in a.m.    CHECKLIST:  Abx: Rocephin  VTE: Heparin  GI ppx: PPI  Diet: Consistent carb, renal  Code: CPR, full  Dispo: Patient has improved since admission.  Pending clinical course, anticipate discharge home in 24 to 48 hours.    Max Bullard DO  [facility]  05/18/23  10:45 EDT

## 2023-05-18 NOTE — PAYOR COMM NOTE
"CONTACT: YAMINI HOLLINS RN  UTILIZATION MANAGEMENT DEPT.  [facility]  [address] [place] [address]  PHONE: [phone]  FAX: [phone]    INPT AUTH REQUEST, REF# [id number]    Jose Davis (47 y.o. Female)     Date of Birth   1975    Social Security Number       Address   [address] [place] [id number]    Home Phone   [phone]    MRN   [id number]       Mosque   Presybeterian    Marital Status                               Admission Date   23    Admission Type   Emergency    Admitting Provider   Max Bullard DO    Attending Provider   Max Bullard DO    Department, Room/Bed   [address], [facility]/       Discharge Date       Discharge Disposition       Discharge Destination                               Attending Provider: Max Bullard DO    Allergies: Codeine, Prozac [Fluoxetine Hcl]    Isolation: None   Infection: None   Code Status: CPR    Ht: 162.6 cm (64\")   Wt: 125 kg (275 lb 8 oz)    Admission Cmt: None   Principal Problem: JESÚS (acute kidney injury) [N17.9]                 Active Insurance as of 2023     Primary Coverage     Payor Plan Insurance Group Employer/Plan Group    [facility] COMMUNITY PLAN [facility] COMMUNITY PLAN [facility]     Payor Plan Address Payor Plan Phone Number Payor Plan Fax Number Effective Dates    [address]   2021 - None Entered    [facility] [address]       Subscriber Name Subscriber Birth Date Member ID       JOSE DAVIS 1975 [id number]                 Emergency Contacts      (Rel.) Home Phone Work Phone Mobile Phone    NASRIN MCNAMARA (Significant Other) [phone] -- --    MICHELLE ALLEN (Son) [phone] -- --               History & Physical      Darlene Orozco DO at 23          [facility]   HISTORY AND PHYSICAL    Patient Name: Jose Davis  : 1975  MRN: [id number]  Primary Care Physician:  " Provider, No Known  Date of admission: 5/17/2023    Subjective   Subjective     Chief Complaint: Nausea, vomiting and diarrhea    History of Present Illness the patient is a 47-year-old female with past medical history significant for type II non-insulin-dependent diabetes mellitus, history of PE, hypertension, anxiety/depression, hypothyroidism, GERD and hyperlipidemia who presents to the emergency department for the above-mentioned complaints.    The patient was seen and examined in .  The patient states that her symptoms started on Sunday.  The patient states that several children and other family members had similar symptoms.  She reported subjective fever earlier during the course of her illness but states that they have resolved.    She reports nausea, vomiting and diarrhea.  She denies any hematemesis.  She denies abdominal pain.  She states that her diarrhea is watery and denies any hematochezia or melena.    The patient reports a poor appetite and inability to eat.  The patient states that she noted decreased urine output today.  She reports that she takes lisinopril at home.  She states that she has taken only one 200 mg tablet of ibuprofen during the course of her illness.  No alcohol use.    Review of Systems   Constitutional: Positive for appetite change, fatigue and fever. Negative for chills.   HENT: Negative for dental problem, hearing loss, rhinorrhea and sore throat.    Eyes: Negative for visual disturbance.   Respiratory: Negative for cough and shortness of breath.    Cardiovascular: Negative for chest pain.   Gastrointestinal: Positive for diarrhea, nausea and vomiting. Negative for abdominal pain and constipation.   Endocrine: Negative for polyuria.   Genitourinary: Positive for decreased urine volume. Negative for dysuria.        Personal History     Past Medical History:   Diagnosis Date   • Anxiety    • Depression    • Diabetes mellitus    • History of pulmonary embolus (PE)    •  Hypertension    • Hypothyroidism        Past Surgical History:   Procedure Laterality Date   • CHOLECYSTECTOMY     • EYE SURGERY     • PULMONARY EMBOLISM SURGERY         Family History: family history includes Diabetes in her father; Heart disease in her father; Hypertension in her father; Stroke in her mother. Otherwise pertinent FHx was reviewed and not pertinent to current issue.    Social History:  reports that she has never smoked. She has never used smokeless tobacco. She reports that she does not drink alcohol and does not use drugs.    Home Medications:  atorvastatin, busPIRone, hydrOXYzine pamoate, levothyroxine, lisinopril, metFORMIN, metoprolol succinate XL, mirtazapine, and omeprazole    Allergies:  Allergies   Allergen Reactions   • Codeine    • Prozac [Fluoxetine Hcl]        Objective    Objective     Vitals:   Temp:  [97.1 °F (36.2 °C)] 97.1 °F (36.2 °C)  Heart Rate:  [86] 86  Resp:  [18] 18  BP: (108)/(62) 108/62    Physical Exam  Constitutional:       General: She is not in acute distress.     Appearance: She is well-developed.   HENT:      Head: Normocephalic and atraumatic.   Eyes:      Conjunctiva/sclera: Conjunctivae normal.   Neck:      Trachea: No tracheal deviation.   Cardiovascular:      Rate and Rhythm: Normal rate and regular rhythm.      Pulses:           Posterior tibial pulses are 2+ on the right side and 2+ on the left side.      Heart sounds: No murmur heard.    No friction rub. No gallop.   Pulmonary:      Effort: No respiratory distress.      Breath sounds: Normal breath sounds. No wheezing or rales.   Abdominal:      General: Bowel sounds are decreased. There is no distension.      Palpations: Abdomen is soft.      Tenderness: There is no abdominal tenderness. There is no guarding.   Musculoskeletal:         General: No tenderness. Normal range of motion.   Skin:     General: Skin is warm and dry.      Findings: No erythema or rash.   Neurological:      Mental Status: She is alert  and oriented to person, place, and time.      Cranial Nerves: No cranial nerve deficit.         Result Review    Result Review:  I have personally reviewed the results from the time of this admission to 5/17/2023 20:01 EDT and agree with these findings:  [x]  Laboratory list / accordion  []  Microbiology  [x]  Radiology  [x]  EKG/Telemetry   []  Cardiology/Vascular   []  Pathology  []  Old records  []  Other:  Most notable findings include:     Results from last 7 days   Lab Units 05/17/23  1657   WBC 10*3/mm3 12.18*   HEMOGLOBIN g/dL 16.3*   HEMATOCRIT % 48.7*   PLATELETS 10*3/mm3 378     Results from last 7 days   Lab Units 05/17/23  1657   SODIUM mmol/L 135*   POTASSIUM mmol/L 4.2   CHLORIDE mmol/L 100   CO2 mmol/L 19.2*   BUN mg/dL 40*   CREATININE mg/dL 3.66*   CALCIUM mg/dL 9.7   BILIRUBIN mg/dL 0.4   ALK PHOS U/L 97   ALT (SGPT) U/L 40*   AST (SGOT) U/L 32   GLUCOSE mg/dL 183*     CT abdomen/pelvis without contrast:  FINDINGS:  Visualized lung bases appear unremarkable.     The liver, spleen, pancreas and adrenal glands appear unremarkable. No  renal calculus or hydronephrosis. Gallbladder is surgically absent.     No evidence of bowel obstruction/colitis/appendicitis. No free air. No  drainable fluid collection. Prominent, nonspecific mesenteric lymph  nodes.     The urinary bladder appears unremarkable. Right ovarian cystic structure  measuring 2.6 cm. No acute osseous abnormality evident.     IMPRESSION:  No acute process in the abdomen or pelvis. Right ovarian cystic  structure measuring 2.6 cm.     Assessment / Plan     Brief Patient Summary:  Arabella Rose is a 47 y.o. female who has PMHx significant for NIDDM type II, HTN, Hypothyroidism, Hx of PE and GERD who presents to the emergency department with a 3-day history of nausea vomiting and diarrhea; work-up in the emergency department has revealed acute kidney injury.     #Acute kidney injury, suspect prerenal/hypovolemic in the setting of  decreased oral intake and GI losses, POA  #Anion gap metabolic acidosis likely due to above  #Probable acute viral gastroenteritis  #Noninsulin-dependent type 2 diabetes mellitus with hyperglycemia  #Erythrocytosis likely due to volume depletion/dehydration  #Mild leukocytosis  #Mild ALT elevation  #Mild TSH elevation with normal free T4  #Incidentally noted right ovarian cystic structure  #Morbid obesity per BMI 48.06 kg/m², complicates all aspects of care  -Patient has been admitted to the telemetry unit  -Renal failure likely due to dehydration and poor oral intake in the setting of GI losses  -CK level not significantly elevated  -No obstruction noted on admission CT  -Patient has been started on a sodium bicarbonate infusion; plan to continue for now  -Repeat chemistry panel and CBC in a.m.  -Antibiotic therapy not felt to be indicated at this time  -Hold nephrotoxic medications; per review of patient's home medication list which has currently not been reconciled by pharmacy, I will plan to hold her lisinopril  -Hold metformin for now  -Accu-Cheks and the hypoglycemia protocol; can add low-dose sliding scale insulin as needed    Chronic medical conditions:  -Hypothyroidism with mild elevation of TSH at 6.2: Plan to resume levothyroxine  -GERD: Plan to resume PPI  -Hyperlipidemia: Plan to resume medications as indicated  -Anxiety/Depression: Plan to resume home medications as indicated after completion of her home medication list  -Reported hx of PE; details unknown    DVT prophylaxis:  [facility]    CODE STATUS:    Code Status (Patient has no pulse and is not breathing): CPR (Attempt to Resuscitate)  Medical Interventions (Patient has pulse or is breathing): Full Support    Admission Status:  I believe this patient meets inpatient status.    Darlene Orozco DO    Electronically signed by Darlene Orozco DO at 05/17/23 2052          Emergency Department Notes      Fernando Bellamy MD at 05/17/23  1630                 MEDICAL SCREENING:    Reason for Visit: Nausea/Vomiting/Diarrhea    Patient initially seen in triage.  The patient was advised further evaluation and diagnostic testing will be needed, some of the treatment and testing will be initiated in the lobby in order to begin the process.  The patient will be returned to the waiting area for the time being and possibly be re-assessed by a subsequent ED provider.  The patient will be brought back to the treatment area in as timely manner as possible.       Fernando Bellamy MD  05/17/23 1630      Electronically signed by Fernando Bellamy MD at 05/17/23 1630     Brett Butts DO at 05/17/23 2285          Subjective   History of Present Illness  47-year-old female with past medical history of diabetes and hypertension presents to the ER due to concerns for persistent diarrhea, nausea, and vomiting.  Symptoms been present for the last 3 to 4 days.  No hematemesis or hematochezia.  No changes in bowel habits.  Decreased urinary output noted over the last 12 to 24 hours.  No chest pain.  No shortness of breath.  No obvious aggravating or alleviating factors.  Vital signs stable.        Review of Systems   Gastrointestinal: Positive for diarrhea, nausea and vomiting.   All other systems reviewed and are negative.      Past Medical History:   Diagnosis Date   • Anxiety    • Depression    • Diabetes mellitus    • History of pulmonary embolus (PE)    • Hypertension    • Hypothyroidism        Allergies   Allergen Reactions   • Codeine    • Prozac [Fluoxetine Hcl]        Past Surgical History:   Procedure Laterality Date   • CHOLECYSTECTOMY     • EYE SURGERY     • PULMONARY EMBOLISM SURGERY         Family History   Problem Relation Age of Onset   • Stroke Mother    • Diabetes Father    • Heart disease Father    • Hypertension Father        Social History     Socioeconomic History   • Marital status:    Tobacco Use   • Smoking status:  Never   • Smokeless tobacco: Never   Vaping Use   • Vaping Use: Never used   Substance and Sexual Activity   • Alcohol use: No   • Drug use: No   • Sexual activity: Defer           Objective   Physical Exam  Constitutional:       General: She is not in acute distress.     Appearance: Normal appearance. She is not ill-appearing.   HENT:      Head: Normocephalic and atraumatic.      Right Ear: External ear normal.      Left Ear: External ear normal.      Nose: Nose normal.      Mouth/Throat:      Mouth: Mucous membranes are moist.   Eyes:      Extraocular Movements: Extraocular movements intact.      Pupils: Pupils are equal, round, and reactive to light.   Cardiovascular:      Rate and Rhythm: Normal rate and regular rhythm.      Heart sounds: No murmur heard.  Pulmonary:      Effort: Pulmonary effort is normal. No respiratory distress.      Breath sounds: Normal breath sounds. No wheezing.   Abdominal:      General: Bowel sounds are normal.      Palpations: Abdomen is soft.      Tenderness: There is no abdominal tenderness.   Musculoskeletal:         General: No deformity or signs of injury. Normal range of motion.      Cervical back: Normal range of motion and neck supple.   Skin:     General: Skin is warm and dry.      Findings: No erythema.   Neurological:      General: No focal deficit present.      Mental Status: She is alert and oriented to person, place, and time. Mental status is at baseline.      Cranial Nerves: No cranial nerve deficit.   Psychiatric:         Mood and Affect: Mood normal.         Behavior: Behavior normal.         Thought Content: Thought content normal.         Procedures          ED Course      CT Abdomen Pelvis Without Contrast    Result Date: 5/17/2023  No acute process in the abdomen or pelvis. Right ovarian cystic structure measuring 2.6 cm.  This report was finalized on 5/17/2023 6:14 PM by Alex Pallas, DO.        Results for orders placed or performed during the hospital encounter  of 05/17/23   Comprehensive Metabolic Panel    Specimen: Blood   Result Value Ref Range    Glucose 183 (H) 65 - 99 mg/dL    BUN 40 (H) 6 - 20 mg/dL    Creatinine 3.66 (H) 0.57 - 1.00 mg/dL    Sodium 135 (L) 136 - 145 mmol/L    Potassium 4.2 3.5 - 5.2 mmol/L    Chloride 100 98 - 107 mmol/L    CO2 19.2 (L) 22.0 - 29.0 mmol/L    Calcium 9.7 8.6 - 10.5 mg/dL    Total Protein 8.7 (H) 6.0 - 8.5 g/dL    Albumin 4.6 3.5 - 5.2 g/dL    ALT (SGPT) 40 (H) 1 - 33 U/L    AST (SGOT) 32 1 - 32 U/L    Alkaline Phosphatase 97 39 - 117 U/L    Total Bilirubin 0.4 0.0 - 1.2 mg/dL    Globulin 4.1 gm/dL    A/G Ratio 1.1 g/dL    BUN/Creatinine Ratio 10.9 7.0 - 25.0    Anion Gap 15.8 (H) 5.0 - 15.0 mmol/L    eGFR 14.6 (L) >60.0 mL/min/1.73   C-reactive Protein    Specimen: Blood   Result Value Ref Range    C-Reactive Protein 1.46 (H) 0.00 - 0.50 mg/dL   Sedimentation Rate    Specimen: Blood   Result Value Ref Range    Sed Rate 15 0 - 20 mm/hr   T4, Free    Specimen: Blood   Result Value Ref Range    Free T4 1.66 0.93 - 1.70 ng/dL   TSH    Specimen: Blood   Result Value Ref Range    TSH 6.200 (H) 0.270 - 4.200 uIU/mL   Magnesium    Specimen: Blood   Result Value Ref Range    Magnesium 2.2 1.6 - 2.6 mg/dL   CK    Specimen: Blood   Result Value Ref Range    Creatine Kinase 185 (H) 20 - 180 U/L   CBC Auto Differential    Specimen: Blood   Result Value Ref Range    WBC 12.18 (H) 3.40 - 10.80 10*3/mm3    RBC 5.69 (H) 3.77 - 5.28 10*6/mm3    Hemoglobin 16.3 (H) 12.0 - 15.9 g/dL    Hematocrit 48.7 (H) 34.0 - 46.6 %    MCV 85.6 79.0 - 97.0 fL    MCH 28.6 26.6 - 33.0 pg    MCHC 33.5 31.5 - 35.7 g/dL    RDW 13.2 12.3 - 15.4 %    RDW-SD 41.1 37.0 - 54.0 fl    MPV 8.8 6.0 - 12.0 fL    Platelets 378 140 - 450 10*3/mm3    Neutrophil % 61.2 42.7 - 76.0 %    Lymphocyte % 26.0 19.6 - 45.3 %    Monocyte % 11.7 5.0 - 12.0 %    Eosinophil % 0.5 0.3 - 6.2 %    Basophil % 0.3 0.0 - 1.5 %    Immature Grans % 0.3 0.0 - 0.5 %    Neutrophils, Absolute 7.45 (H) 1.70 -  7.00 10*3/mm3    Lymphocytes, Absolute 3.17 (H) 0.70 - 3.10 10*3/mm3    Monocytes, Absolute 1.42 (H) 0.10 - 0.90 10*3/mm3    Eosinophils, Absolute 0.06 0.00 - 0.40 10*3/mm3    Basophils, Absolute 0.04 0.00 - 0.20 10*3/mm3    Immature Grans, Absolute 0.04 0.00 - 0.05 10*3/mm3    nRBC 0.0 0.0 - 0.2 /100 WBC                                          Medical Decision Making  CBC unremarkable.  CMP notes an elevated creatinine of 3.0.  Previous creatinine normal.  CT imaging of the abdomen pelvis noted no acute abnormality.  Patient received IV fluid resuscitation.  Concerns for dehydration and acute renal failure.  Recommend admission for further work up and treatment.  Hospitalist team consulted and made aware of the patient.  Consults and orders placed per hospitalist request.  Patient was agreeable to admission plan.  Vitals stable on admission.    Acute renal failure, unspecified acute renal failure type: complicated acute illness or injury  Dehydration: complicated acute illness or injury  Amount and/or Complexity of Data Reviewed  Labs: ordered. Decision-making details documented in ED Course.  Radiology: ordered. Decision-making details documented in ED Course.      Risk  Prescription drug management.  Decision regarding hospitalization.          Final diagnoses:   Acute renal failure, unspecified acute renal failure type   Dehydration       ED Disposition  ED Disposition     ED Disposition   Decision to Admit    Condition   --    Comment   Level of Care: Telemetry [5]   Diagnosis: JESÚS (acute kidney injury) [983952]   Certification: I Certify That Inpatient Hospital Services Are Medically Necessary For Greater Than 2 Midnights               No follow-up provider specified.       Medication List      No changes were made to your prescriptions during this visit.          Brett Butts DO  05/17/23 1857      Electronically signed by Brett Butts DO at 05/17/23 1857         Facility-Administered Medications as  of 5/18/2023   Medication Dose Route Frequency Provider Last Rate Last Admin   • acetaminophen (TYLENOL) tablet 650 mg  650 mg Oral Q4H PRN Max Bullard DO       • atorvastatin (LIPITOR) tablet 40 mg  40 mg Oral Daily Darlene Orozco DO   40 mg at 05/18/23 0853   • sennosides-docusate (PERICOLACE) 8.6-50 MG per tablet 2 tablet  2 tablet Oral BID Max Bullard DO        And   • polyethylene glycol (MIRALAX) packet 17 g  17 g Oral Daily PRN Max Bullard DO        And   • bisacodyl (DULCOLAX) EC tablet 5 mg  5 mg Oral Daily PRN Max Bullard DO        And   • bisacodyl (DULCOLAX) suppository 10 mg  10 mg Rectal Daily PRN Max Bullard DO       • busPIRone (BUSPAR) tablet 15 mg  15 mg Oral BID PRN Darlene Orozco DO       • cefTRIAXone (ROCEPHIN) 1 g in sodium chloride 0.9 % 100 mL IVPB-VTB  1 g Intravenous Q24H Max Bullard  mL/hr at 05/18/23 0909 1 g at 05/18/23 0909   • dextrose (D50W) (25 g/50 mL) IV injection 25 g  25 g Intravenous Q15 Min PRN Darlene Orozco DO       • dextrose (GLUTOSE) oral gel 15 g  15 g Oral Q15 Min PRN Darlene Orozco DO       • [COMPLETED] dicyclomine (BENTYL) capsule 20 mg  20 mg Oral Once Brett Butts DO   20 mg at 05/17/23 1744   • glucagon HCl (Diagnostic) injection 1 mg  1 mg Subcutaneous Q15 Min PRN Darlene Orozco DO       • heparin (porcine) 5000 UNIT/ML injection 5,000 Units  5,000 Units Subcutaneous Q8H Max Bullard DO   5,000 Units at 05/18/23 0514   • hydrOXYzine (ATARAX) tablet 25 mg  25 mg Oral TID PRN Alona Bueno PA-C   25 mg at 05/17/23 2231   • levothyroxine (SYNTHROID, LEVOTHROID) tablet 75 mcg  75 mcg Oral Daily Darlene Orozco DO   75 mcg at 05/18/23 0852   • magnesium sulfate 4g/100mL (PREMIX) infusion  4 g Intravenous Once Max Bullard DO       • metoprolol succinate XL (TOPROL-XL) 24 hr tablet 50 mg  50 mg Oral  Daily Darlene Orozco DO       • nitroglycerin (NITROSTAT) SL tablet 0.4 mg  0.4 mg Sublingual Q5 Min PRN Max Bullard DO       • ondansetron (ZOFRAN) tablet 4 mg  4 mg Oral Q6H PRN Max Bullard DO       • pantoprazole (PROTONIX) EC tablet 40 mg  40 mg Oral Q AM Darlene Orozco DO   40 mg at 05/18/23 0514   • [COMPLETED] pantoprazole (PROTONIX) injection 40 mg  40 mg Intravenous Once Brett Butts DO   40 mg at 05/17/23 1744   • Pharmacy Meds to Bed Consult   Does not apply Daily Darlene Orozco DO       • pramipexole (MIRAPEX) tablet 0.25 mg  0.25 mg Oral Nightly Darlene Orozco DO   0.25 mg at 05/17/23 2344   • promethazine (PHENERGAN) tablet 12.5 mg  12.5 mg Oral Q6H PRN Max Bullard DO       • sodium bicarbonate 8.4 % 75 mEq in sodium chloride 0.45 % 1,000 mL infusion (greater than 75 mEq)  75 mEq Intravenous Continuous Max Bullard  mL/hr at 05/18/23 0343 75 mEq at 05/18/23 0343   • [COMPLETED] sodium chloride 0.9 % bolus 1,000 mL  1,000 mL Intravenous Once Brett Butts DO   Stopped at 05/17/23 1814   • [COMPLETED] sodium chloride 0.9 % bolus 1,000 mL  1,000 mL Intravenous Once Max Bullard DO 2,000 mL/hr at 05/17/23 1931 1,000 mL at 05/17/23 1931   • sodium chloride 0.9 % flush 10 mL  10 mL Intravenous PRN Max Bullard DO       • sodium chloride 0.9 % flush 10 mL  10 mL Intravenous Q12H Max Bullard DO   10 mL at 05/18/23 0853   • sodium chloride 0.9 % flush 10 mL  10 mL Intravenous PRN Max Bullard DO       • sodium chloride 0.9 % infusion 40 mL  40 mL Intravenous PRN Max Bullard DO         Lab Results (all)     Procedure Component Value Units Date/Time    Lactic Acid, Plasma [[id number]]  (Normal) Collected: 05/18/23 0832    Specimen: Blood Updated: 05/18/23 0911     Lactate 1.7 mmol/L     Blood Culture - Blood, Wrist, Left [[id number]] Collected: 05/18/23  0832    Specimen: Blood from Wrist, Left Updated: 05/18/23 0850    Blood Culture - Blood, Arm, Left [[id number]] Collected: 05/18/23 0832    Specimen: Blood from Arm, Left Updated: 05/18/23 0849    Urine Culture - Urine, Urine, Clean Catch [[id number]] Collected: 05/17/23 2143    Specimen: Urine, Clean Catch Updated: 05/18/23 0844    POC Glucose Once [[id number]]  (Normal) Collected: 05/18/23 0614    Specimen: Blood Updated: 05/18/23 0652     Glucose 72 mg/dL      Comment: Meter: [id number] : [id number] RAYMOND BAILEY       CBC & Differential [[id number]] Collected: 05/18/23 0128    Specimen: Blood Updated: 05/18/23 0302    Narrative:      The following orders were created for panel order CBC & Differential.  Procedure                               Abnormality         Status                     ---------                               -----------         ------                     CBC Auto Differential[[id number]]        Normal              Final result               Scan Slide[[id number]]                                                                    Please view results for these tests on the individual orders.    Manual Differential [[id number]]  (Abnormal) Collected: 05/18/23 0128    Specimen: Blood Updated: 05/18/23 0301     Neutrophil % 66.0 %      Lymphocyte % 23.0 %      Monocyte % 5.0 %      Bands %  6.0 %      Neutrophils Absolute 6.84 10*3/mm3      Lymphocytes Absolute 2.19 10*3/mm3      Monocytes Absolute 0.48 10*3/mm3      Hypochromia Slight/1+     Platelet Morphology Normal    CBC Auto Differential [[id number]]  (Normal) Collected: 05/18/23 0128    Specimen: Blood Updated: 05/18/23 0301     WBC 9.50 10*3/mm3      RBC 4.54 10*6/mm3      Hemoglobin 12.9 g/dL      Hematocrit 38.9 %      MCV 85.7 fL      MCH 28.4 pg      MCHC 33.2 g/dL      RDW 13.2 %      RDW-SD 41.2 fl      MPV 9.1 fL      Platelets 262 10*3/mm3     Basic Metabolic Panel [[id number]]  (Abnormal) Collected: 05/18/23 0128    Specimen: Blood  Updated: 05/18/23 0229     Glucose 100 mg/dL      BUN 40 mg/dL      Creatinine 2.69 mg/dL      Sodium 133 mmol/L      Potassium 3.5 mmol/L      Chloride 103 mmol/L      CO2 17.7 mmol/L      Calcium 8.1 mg/dL      BUN/Creatinine Ratio 14.9     Anion Gap 12.3 mmol/L      eGFR 21.1 mL/min/1.73     Narrative:      GFR Normal >60  Chronic Kidney Disease <60  Kidney Failure <15      Magnesium [[id number]]  (Normal) Collected: 05/18/23 0128    Specimen: Blood Updated: 05/18/23 0229     Magnesium 1.8 mg/dL     Urinalysis With Microscopic If Indicated (No Culture) - Urine, Clean Catch [[id number]]  (Abnormal) Collected: 05/17/23 2143    Specimen: Urine, Clean Catch Updated: 05/17/23 2327     Color, UA Dark Yellow     Appearance, UA Turbid     pH, UA <=5.0     Specific Gravity, UA 1.020     Glucose, UA Negative     Ketones, UA Trace     Bilirubin, UA Moderate (2+)     Blood, UA Large (3+)     Protein,  mg/dL (2+)     Leuk Esterase, UA Small (1+)     Nitrite, UA Positive     Urobilinogen, UA 1.0 E.U./dL    Urinalysis, Microscopic Only - Urine, Clean Catch [[id number]]  (Abnormal) Collected: 05/17/23 2143    Specimen: Urine, Clean Catch Updated: 05/17/23 2327     RBC, UA 0-2 /HPF      WBC, UA 13-20 /HPF      Bacteria, UA 4+ /HPF      Squamous Epithelial Cells, UA 13-20 /HPF      Hyaline Casts, UA 3-6 /LPF      Granular Casts, UA 0-2 /LPF      Calcium Oxalate Crystals, UA Small/1+ /HPF      Methodology Manual Light Microscopy    Protein, Urine, Random - Urine, Clean Catch [[id number]] Collected: 05/17/23 2143    Specimen: Urine, Clean Catch Updated: 05/17/23 2313     Total Protein, Urine 151.4 mg/dL     Narrative:      Reference intervals for random urine have not been established.  Clinical usage is dependent upon physician's interpretation in combination with other laboratory tests.       Protein / Creatinine Ratio, Urine - Urine, Clean Catch [[id number]] Collected: 05/17/23 2143    Specimen: Urine, Clean Catch Updated:  05/17/23 2146    Creatinine Urine Random (kidney function) GFR component - Urine, Clean Catch [[id number]] Collected: 05/17/23 2143    Specimen: Urine, Clean Catch Updated: 05/17/23 2146    POC Glucose Once [[id number]]  (Normal) Collected: 05/17/23 2034    Specimen: Blood Updated: 05/17/23 2041     Glucose 119 mg/dL      Comment: Meter: [id number] : [id number] TIMUR LAL       COVID PRE-OP / PRE-PROCEDURE SCREENING ORDER (NO ISOLATION) - Swab, Nasopharynx [[id number]]  (Normal) Collected: 05/17/23 1911    Specimen: Swab from Nasopharynx Updated: 05/17/23 1947    Narrative:      The following orders were created for panel order COVID PRE-OP / PRE-PROCEDURE SCREENING ORDER (NO ISOLATION) - Swab, Nasopharynx.  Procedure                               Abnormality         Status                     ---------                               -----------         ------                     COVID-19,CEPHEID/ADELE,CO...[[id number]]  Normal              Final result                 Please view results for these tests on the individual orders.    COVID-19,CEPHEID/ADELE,COR/TEENA/PAD/BRIANNA/MAD IN-HOUSE(OR EMERGENT/ADD-ON),NP SWAB IN TRANSPORT MEDIA 3-4 HR TAT, RT-PCR - Swab, Nasopharynx [[id number]]  (Normal) Collected: 05/17/23 1911    Specimen: Swab from Nasopharynx Updated: 05/17/23 1947     COVID19 Not Detected    Narrative:      Fact sheet for providers: https://www.fda.gov/media/630434/download     Fact sheet for patients: https://www.fda.gov/media/432452/download  Fact sheet for providers: https://www.fda.gov/media/574448/download    Fact sheet for patients: https://www.fda.gov/media/741213/download    Test performed by PCR.    Acetone [[id number]]  (Normal) Collected: 05/17/23 1910    Specimen: Blood from Arm, Left Updated: 05/17/23 1934     Acetone Negative    TSH [[id number]]  (Abnormal) Collected: 05/17/23 1657    Specimen: Blood Updated: 05/17/23 1741     TSH 6.200 uIU/mL     T4, Free [[id number]]  (Normal) Collected: 05/17/23  1657    Specimen: Blood Updated: 05/17/23 1741     Free T4 1.66 ng/dL     Narrative:      Results may be falsely increased if patient taking Biotin.      Comprehensive Metabolic Panel [[id number]]  (Abnormal) Collected: 05/17/23 1657    Specimen: Blood Updated: 05/17/23 1727     Glucose 183 mg/dL      BUN 40 mg/dL      Creatinine 3.66 mg/dL      Sodium 135 mmol/L      Potassium 4.2 mmol/L      Chloride 100 mmol/L      CO2 19.2 mmol/L      Calcium 9.7 mg/dL      Total Protein 8.7 g/dL      Albumin 4.6 g/dL      ALT (SGPT) 40 U/L      AST (SGOT) 32 U/L      Alkaline Phosphatase 97 U/L      Total Bilirubin 0.4 mg/dL      Globulin 4.1 gm/dL      A/G Ratio 1.1 g/dL      BUN/Creatinine Ratio 10.9     Anion Gap 15.8 mmol/L      eGFR 14.6 mL/min/1.73      Comment: <15 Indicative of kidney failure       Narrative:      GFR Normal >60  Chronic Kidney Disease <60  Kidney Failure <15      Magnesium [[id number]]  (Normal) Collected: 05/17/23 1657    Specimen: Blood Updated: 05/17/23 1727     Magnesium 2.2 mg/dL     CK [[id number]]  (Abnormal) Collected: 05/17/23 1657    Specimen: Blood Updated: 05/17/23 1727     Creatine Kinase 185 U/L     C-reactive Protein [[id number]]  (Abnormal) Collected: 05/17/23 1657    Specimen: Blood Updated: 05/17/23 1727     C-Reactive Protein 1.46 mg/dL     Sedimentation Rate [[id number]]  (Normal) Collected: 05/17/23 1657    Specimen: Blood Updated: 05/17/23 1721     Sed Rate 15 mm/hr     CBC & Differential [[id number]]  (Abnormal) Collected: 05/17/23 1657    Specimen: Blood Updated: 05/17/23 1703    Narrative:      The following orders were created for panel order CBC & Differential.  Procedure                               Abnormality         Status                     ---------                               -----------         ------                     CBC Auto Differential[[id number]]        Abnormal            Final result                 Please view results for these tests on the individual orders.     CBC Auto Differential [[id number]]  (Abnormal) Collected: 05/17/23 1657    Specimen: Blood Updated: 05/17/23 1703     WBC 12.18 10*3/mm3      RBC 5.69 10*6/mm3      Hemoglobin 16.3 g/dL      Hematocrit 48.7 %      MCV 85.6 fL      MCH 28.6 pg      MCHC 33.5 g/dL      RDW 13.2 %      RDW-SD 41.1 fl      MPV 8.8 fL      Platelets 378 10*3/mm3      Neutrophil % 61.2 %      Lymphocyte % 26.0 %      Monocyte % 11.7 %      Eosinophil % 0.5 %      Basophil % 0.3 %      Immature Grans % 0.3 %      Neutrophils, Absolute 7.45 10*3/mm3      Lymphocytes, Absolute 3.17 10*3/mm3      Monocytes, Absolute 1.42 10*3/mm3      Eosinophils, Absolute 0.06 10*3/mm3      Basophils, Absolute 0.04 10*3/mm3      Immature Grans, Absolute 0.04 10*3/mm3      nRBC 0.0 /100 WBC           Orders (all)      Start     Ordered    05/20/23 0810  Auto Discontinue GI Panel in 48 Hours if not Collected  ONCE GI PANEL         05/18/23 0809    05/18/23 1000  magnesium sulfate 4g/100mL (PREMIX) infusion  Once         05/18/23 0809    05/18/23 0900  Pharmacy Meds to Bed Consult  Daily         05/17/23 2139 05/18/23 0900  levothyroxine (SYNTHROID, LEVOTHROID) tablet 75 mcg  Daily         05/17/23 2237 05/18/23 0900  metoprolol succinate XL (TOPROL-XL) 24 hr tablet 50 mg  Daily         05/17/23 2237 05/18/23 0900  atorvastatin (LIPITOR) tablet 40 mg  Daily         05/17/23 2237 05/18/23 0900  cefTRIAXone (ROCEPHIN) 1 g in sodium chloride 0.9 % 100 mL IVPB-VTB  Every 24 Hours         05/18/23 0811 05/18/23 0811  Lactic Acid, Plasma  Once         05/18/23 0811    05/18/23 0811  Blood Culture - Blood, Wrist, Left  Once        See Hyperspace for full Linked Orders Report.    05/18/23 0811    05/18/23 0811  Blood Culture - Blood, Arm, Left  Once        See Hyperspace for full Linked Orders Report.    05/18/23 0811    05/18/23 0811  Urine Culture - Urine, Urine, Clean Catch  Once         05/18/23 0811    05/18/23 0810  Gastrointestinal Panel, PCR -  Stool, Per Rectum  Once         05/18/23 0809    05/18/23 0800  Oral Care  2 Times Daily       05/17/23 2027 05/18/23 0653  POC Glucose Once  PROCEDURE ONCE         05/18/23 0614    05/18/23 0600  CBC & Differential  Daily       05/17/23 2123 05/18/23 0600  Basic Metabolic Panel  Daily       05/17/23 2123 05/18/23 0600  Magnesium  Morning Draw         05/17/23 2123 05/18/23 0600  CBC Auto Differential  PROCEDURE ONCE         05/17/23 2205 05/18/23 0600  pantoprazole (PROTONIX) EC tablet 40 mg  Every Early Morning         05/17/23 2237 05/18/23 0258  Manual Differential  Once         05/18/23 0257 05/18/23 0203  Scan Slide  Once,   Status:  Canceled         05/18/23 0202    05/18/23 0000  Vital Signs  Every 4 Hours       05/17/23 2027 05/17/23 2330  pramipexole (MIRAPEX) tablet 0.25 mg  Nightly         05/17/23 2237 05/17/23 2306  Urinalysis, Microscopic Only - Urine, Clean Catch  Once         05/17/23 2305 05/17/23 2236  busPIRone (BUSPAR) tablet 15 mg  2 Times Daily PRN         05/17/23 2237 05/17/23 2203  hydrOXYzine (ATARAX) tablet 25 mg  3 Times Daily PRN         05/17/23 2203 05/17/23 2200  heparin (porcine) 5000 UNIT/ML injection 5,000 Units  Every 8 Hours Scheduled         05/17/23 2027 05/17/23 2200  POC Glucose Finger 4x Daily AC & at Bedtime  4 Times Daily Before Meals & at Bedtime       05/17/23 2052 05/17/23 2139  Inpatient Case Management  Consult  Once        Provider:  (Not yet assigned)    05/17/23 2139 05/17/23 2124  Protein / Creatinine Ratio, Urine - Urine, Clean Catch  Once         05/17/23 2123 05/17/23 2124  Protein, Urine, Random - Urine, Clean Catch  Once         05/17/23 2123 05/17/23 2124  Creatinine Urine Random (kidney function) GFR component - Urine, Clean Catch  Once         05/17/23 2123 05/17/23 2123  promethazine (PHENERGAN) tablet 12.5 mg  Every 6 Hours PRN         05/17/23 2123 05/17/23 2115  sodium chloride  0.9 % flush 10 mL  Every 12 Hours Scheduled         05/17/23 2027 05/17/23 2115  sennosides-docusate (PERICOLACE) 8.6-50 MG per tablet 2 tablet  2 Times Daily        See Hyperspace for full Linked Orders Report.    05/17/23 2027 05/17/23 2052  dextrose (GLUTOSE) oral gel 15 g  Every 15 Minutes PRN         05/17/23 2052 05/17/23 2052  dextrose (D50W) (25 g/50 mL) IV injection 25 g  Every 15 Minutes PRN         05/17/23 2052 05/17/23 2052  glucagon HCl (Diagnostic) injection 1 mg  Every 15 Minutes PRN         05/17/23 2052 05/17/23 2042  POC Glucose Once  PROCEDURE ONCE         05/17/23 2034 05/17/23 2028  Intake & Output  Every Shift       05/17/23 2027 05/17/23 2028  Weigh Patient  Once         05/17/23 2027 05/17/23 2028  Insert Peripheral IV  Once         05/17/23 2027 05/17/23 2028  Saline Lock & Maintain IV Access  Continuous,   Status:  Canceled         05/17/23 2027 05/17/23 2028  Continuous Cardiac Monitoring  Continuous        Comments: Follow Standing Orders As Outlined in Process Instructions (Open Order Report to View Full Instructions)    05/17/23 2027 05/17/23 2028  Telemetry - Maintain IV Access  Continuous         05/17/23 2027 05/17/23 2028  Telemetry - Place Orders & Notify Provider of Results When Patient Experiences Acute Chest Pain, Dysrhythmia or Respiratory Distress  Until Discontinued         05/17/23 2027 05/17/23 2028  May Be Off Telemetry for Tests  Continuous         05/17/23 2027 05/17/23 2028  Activity - Ad Jordyn  Until Discontinued         05/17/23 2027 05/17/23 2028  Diet: Cardiac Diets, Diabetic Diets, Renal Diets; Healthy Heart (2-3 Na+); Consistent Carbohydrate; Low Sodium (2-3g), Low Potassium, Low Phosphorus; Texture: Regular Texture (IDDSI 7); Fluid Consistency: Thin (IDDSI 0)  Diet Effective Now         05/17/23 2027 05/17/23 2027  sodium chloride 0.9 % flush 10 mL  As Needed         05/17/23 2027 05/17/23 2027  sodium chloride  0.9 % infusion 40 mL  As Needed         05/17/23 2027 05/17/23 2027  polyethylene glycol (MIRALAX) packet 17 g  Daily PRN        See Hyperspace for full Linked Orders Report.    05/17/23 2027 05/17/23 2027  bisacodyl (DULCOLAX) EC tablet 5 mg  Daily PRN        See Hyperspace for full Linked Orders Report.    05/17/23 2027 05/17/23 2027  bisacodyl (DULCOLAX) suppository 10 mg  Daily PRN        See Hyperspace for full Linked Orders Report.    05/17/23 2027 05/17/23 2027  nitroglycerin (NITROSTAT) SL tablet 0.4 mg  Every 5 Minutes PRN         05/17/23 2027 05/17/23 2027  acetaminophen (TYLENOL) tablet 650 mg  Every 4 Hours PRN         05/17/23 2027 05/17/23 2027  ondansetron (ZOFRAN) tablet 4 mg  Every 6 Hours PRN         05/17/23 2027 05/17/23 1922  COVID PRE-OP / PRE-PROCEDURE SCREENING ORDER (NO ISOLATION) - Swab, Nasopharynx  Once,   Status:  Canceled         05/17/23 1922 05/17/23 1856  COVID PRE-OP / PRE-PROCEDURE SCREENING ORDER (NO ISOLATION) - Swab, Nasopharynx  Once         05/17/23 1855 05/17/23 1856  COVID-19,CEPHEID/ADELE,COR/TEENA/PAD/BRIANNA/MAD IN-HOUSE(OR EMERGENT/ADD-ON),NP SWAB IN TRANSPORT MEDIA 3-4 HR TAT, RT-PCR - Swab, Nasopharynx  PROCEDURE ONCE         05/17/23 1855 05/17/23 1851  sodium chloride 0.9 % bolus 1,000 mL  Once         05/17/23 1849 05/17/23 1851  sodium bicarbonate 8.4 % 75 mEq in sodium chloride 0.45 % 1,000 mL infusion (greater than 75 mEq)  Continuous         05/17/23 1849 05/17/23 1851  Code Status and Medical Interventions:  Continuous         05/17/23 1851 05/17/23 1850  Inpatient Admission  Once         05/17/23 1851 05/17/23 1848  Acetone  Once         05/17/23 1847 05/17/23 1737  CT Abdomen Pelvis Without Contrast  1 Time Imaging        Comments: NON-CONTRASTED STUDY      05/17/23 1736    05/17/23 1712  pantoprazole (PROTONIX) injection 40 mg  Once         05/17/23 1710    05/17/23 1712  dicyclomine (BENTYL) capsule 20 mg  Once          05/17/23 1710    05/17/23 1711  sodium chloride 0.9 % bolus 1,000 mL  Once         05/17/23 1709    05/17/23 1710  Insert Peripheral IV  Once        See Hyperspace for full Linked Orders Report.    05/17/23 1709    05/17/23 1709  sodium chloride 0.9 % flush 10 mL  As Needed        See Hyperspace for full Linked Orders Report.    05/17/23 1709    05/17/23 1704  Urinalysis With Microscopic If Indicated (No Culture) - Urine, Clean Catch  Once         05/17/23 1631    05/17/23 1631  CBC & Differential  Once         05/17/23 1631    05/17/23 1631  Comprehensive Metabolic Panel  Once         05/17/23 1631    05/17/23 1631  C-reactive Protein  Once         05/17/23 1631    05/17/23 1631  Sedimentation Rate  Once         05/17/23 1631    05/17/23 1631  T4, Free  Once         05/17/23 1631    05/17/23 1631  TSH  Once         05/17/23 1631    05/17/23 1631  Magnesium  Once         05/17/23 1631    05/17/23 1631  CK  Once         05/17/23 1631    05/17/23 1631  CBC Auto Differential  PROCEDURE ONCE         05/17/23 1631    Unscheduled  Follow Hypoglycemia Standing Orders For Blood Glucose <70 & Notify Provider of Treatment  As Needed      Comments: Follow Hypoglycemia Orders As Outlined in Process Instructions (Open Order Report to View Full Instructions)  Notify Provider Any Time Hypoglycemia Treatment is Administered    05/17/23 2052    --  pramipexole (MIRAPEX) 0.25 MG tablet  Nightly         05/17/23 2222    --  busPIRone (BUSPAR) 15 MG tablet  2 Times Daily PRN         05/17/23 2230    --  SCANNED - TELEMETRY           05/17/23 0000                Physician Progress Notes (all)    No notes of this type exist for this encounter.         Consult Notes (all)    No notes of this type exist for this encounter.

## 2023-05-18 NOTE — CASE MANAGEMENT/SOCIAL WORK
Discharge Planning Assessment  [facility]     Patient Name: Arabella Rose  MRN: [id number]  Today's Date: 5/18/2023    Admit Date: 5/17/2023     Discharge Needs Assessment     Row Name 05/18/23 1625       Living Environment    People in Home significant other;parent(s);child(cristina), adult    Name(s) of People in Home Significant other Brian Hess    Current Living Arrangements other (see comments)  mobile home    Potentially Unsafe Housing Conditions none    Primary Care Provided by self    Provides Primary Care For no one    Family Caregiver if Needed significant other    Quality of Family Relationships helpful;involved;supportive       Resource/Environmental Concerns    Resource/Environmental Concerns none       Transportation Needs    In the past 12 months, has lack of transportation kept you from medical appointments or from getting medications? no    In the past 12 months, has lack of transportation kept you from meetings, work, or from getting things needed for daily living? No       Food Insecurity    Within the past 12 months, you worried that your food would run out before you got the money to buy more. Never true    Within the past 12 months, the food you bought just didn't last and you didn't have money to get more. Never true       Transition Planning    Patient/Family Anticipates Transition to home with family    Transportation Anticipated family or friend will provide       Discharge Needs Assessment    Equipment Currently Used at Home cpap    Concerns to be Addressed discharge planning               Discharge Plan     Row Name 05/18/23 1626       Plan    Plan Pt was admitted on 05/17/23. SS recieved nursing consult for dishcarge planning. SS spoke with Pt at bedside on this date. Pt lives with her significant other, Brian and other relatives and plans to return home at discharge. Pt does not utilize home health services. Pt has a cpap via [facility]. Pt's PCP is Benigno Verduzco,  MARTH[place]. Pt states Physician made her ware of possible discharge date for 05/19/23 and significant other to proivde transportation. SS to follow.                AUGUSTINA BakerW

## 2023-05-19 VITALS
RESPIRATION RATE: 16 BRPM | OXYGEN SATURATION: 97 % | HEIGHT: 64 IN | BODY MASS INDEX: 47.1 KG/M2 | WEIGHT: 275.9 LBS | DIASTOLIC BLOOD PRESSURE: 76 MMHG | HEART RATE: 81 BPM | TEMPERATURE: 98.4 F | SYSTOLIC BLOOD PRESSURE: 178 MMHG

## 2023-05-19 LAB
ANION GAP SERPL CALCULATED.3IONS-SCNC: 8.1 MMOL/L (ref 5–15)
BUN SERPL-MCNC: 21 MG/DL (ref 6–20)
BUN/CREAT SERPL: 19.1 (ref 7–25)
CALCIUM SPEC-SCNC: 8 MG/DL (ref 8.6–10.5)
CHLORIDE SERPL-SCNC: 106 MMOL/L (ref 98–107)
CO2 SERPL-SCNC: 25.9 MMOL/L (ref 22–29)
CREAT SERPL-MCNC: 1.1 MG/DL (ref 0.57–1)
DEPRECATED RDW RBC AUTO: 40.8 FL (ref 37–54)
EGFRCR SERPLBLD CKD-EPI 2021: 61.7 ML/MIN/1.73
EOSINOPHIL # BLD MANUAL: 0.08 10*3/MM3 (ref 0–0.4)
EOSINOPHIL NFR BLD MANUAL: 1 % (ref 0.3–6.2)
ERYTHROCYTE [DISTWIDTH] IN BLOOD BY AUTOMATED COUNT: 13.1 % (ref 12.3–15.4)
GLUCOSE BLDC GLUCOMTR-MCNC: 87 MG/DL (ref 70–130)
GLUCOSE SERPL-MCNC: 95 MG/DL (ref 65–99)
HCT VFR BLD AUTO: 35.4 % (ref 34–46.6)
HGB BLD-MCNC: 11.9 G/DL (ref 12–15.9)
HYPOCHROMIA BLD QL: NORMAL
LYMPHOCYTES # BLD MANUAL: 3.01 10*3/MM3 (ref 0.7–3.1)
LYMPHOCYTES NFR BLD MANUAL: 8 % (ref 5–12)
MCH RBC QN AUTO: 28.6 PG (ref 26.6–33)
MCHC RBC AUTO-ENTMCNC: 33.6 G/DL (ref 31.5–35.7)
MCV RBC AUTO: 85.1 FL (ref 79–97)
MONOCYTES # BLD: 0.62 10*3/MM3 (ref 0.1–0.9)
NEUTROPHILS # BLD AUTO: 4.02 10*3/MM3 (ref 1.7–7)
NEUTROPHILS NFR BLD MANUAL: 51 % (ref 42.7–76)
NEUTS BAND NFR BLD MANUAL: 1 % (ref 0–5)
PLAT MORPH BLD: NORMAL
PLATELET # BLD AUTO: 242 10*3/MM3 (ref 140–450)
PMV BLD AUTO: 9.1 FL (ref 6–12)
POTASSIUM SERPL-SCNC: 3.1 MMOL/L (ref 3.5–5.2)
RBC # BLD AUTO: 4.16 10*6/MM3 (ref 3.77–5.28)
SCAN SLIDE: NORMAL
SODIUM SERPL-SCNC: 140 MMOL/L (ref 136–145)
VARIANT LYMPHS NFR BLD MANUAL: 39 % (ref 19.6–45.3)
WBC NRBC COR # BLD: 7.73 10*3/MM3 (ref 3.4–10.8)

## 2023-05-19 PROCEDURE — 80048 BASIC METABOLIC PNL TOTAL CA: CPT | Performed by: STUDENT IN AN ORGANIZED HEALTH CARE EDUCATION/TRAINING PROGRAM

## 2023-05-19 PROCEDURE — 85025 COMPLETE CBC W/AUTO DIFF WBC: CPT | Performed by: STUDENT IN AN ORGANIZED HEALTH CARE EDUCATION/TRAINING PROGRAM

## 2023-05-19 PROCEDURE — 25010000002 HEPARIN (PORCINE) PER 1000 UNITS: Performed by: STUDENT IN AN ORGANIZED HEALTH CARE EDUCATION/TRAINING PROGRAM

## 2023-05-19 PROCEDURE — 99239 HOSP IP/OBS DSCHRG MGMT >30: CPT | Performed by: STUDENT IN AN ORGANIZED HEALTH CARE EDUCATION/TRAINING PROGRAM

## 2023-05-19 PROCEDURE — 82948 REAGENT STRIP/BLOOD GLUCOSE: CPT

## 2023-05-19 PROCEDURE — 85007 BL SMEAR W/DIFF WBC COUNT: CPT | Performed by: STUDENT IN AN ORGANIZED HEALTH CARE EDUCATION/TRAINING PROGRAM

## 2023-05-19 RX ORDER — CEFDINIR 300 MG/1
300 CAPSULE ORAL 2 TIMES DAILY
Qty: 6 CAPSULE | Refills: 0 | Status: SHIPPED | OUTPATIENT
Start: 2023-05-19 | End: 2023-05-22

## 2023-05-19 RX ORDER — LISINOPRIL 10 MG/1
30 TABLET ORAL DAILY
Status: DISCONTINUED | OUTPATIENT
Start: 2023-05-19 | End: 2023-05-19 | Stop reason: HOSPADM

## 2023-05-19 RX ORDER — PROMETHAZINE HYDROCHLORIDE 12.5 MG/1
12.5 TABLET ORAL EVERY 6 HOURS PRN
Qty: 20 TABLET | Refills: 0 | Status: SHIPPED | OUTPATIENT
Start: 2023-05-19

## 2023-05-19 RX ORDER — POTASSIUM CHLORIDE 20 MEQ/1
40 TABLET, EXTENDED RELEASE ORAL ONCE
Status: COMPLETED | OUTPATIENT
Start: 2023-05-19 | End: 2023-05-19

## 2023-05-19 RX ADMIN — HEPARIN SODIUM 5000 UNITS: 5000 INJECTION INTRAVENOUS; SUBCUTANEOUS at 06:32

## 2023-05-19 RX ADMIN — ACETAMINOPHEN 650 MG: 325 TABLET ORAL at 02:53

## 2023-05-19 RX ADMIN — Medication 10 ML: at 08:28

## 2023-05-19 RX ADMIN — METOPROLOL SUCCINATE 50 MG: 50 TABLET, EXTENDED RELEASE ORAL at 08:28

## 2023-05-19 RX ADMIN — ATORVASTATIN CALCIUM 40 MG: 40 TABLET, FILM COATED ORAL at 08:28

## 2023-05-19 RX ADMIN — PANTOPRAZOLE SODIUM 40 MG: 40 TABLET, DELAYED RELEASE ORAL at 06:32

## 2023-05-19 RX ADMIN — POTASSIUM CHLORIDE 40 MEQ: 20 TABLET, EXTENDED RELEASE ORAL at 06:32

## 2023-05-19 RX ADMIN — LEVOTHYROXINE SODIUM 75 MCG: 0.07 TABLET ORAL at 08:28

## 2023-05-19 NOTE — PLAN OF CARE
Goal Outcome Evaluation:  Patient discharged per Juan Miguel DANIELSON. IV and telemetry D/C'd. Discharge paperwork discussed with patient. Patient has tolerated all interventions. No complaints or concerns at this time. No signs of acute distress noted. Will continue to monitor until patient off unit.

## 2023-05-19 NOTE — DISCHARGE SUMMARY
[facility] HOSPITALISTS DISCHARGE SUMMARY    Patient Identification:  Name:  Arabella Rose  Age:  47 y.o.  Sex:  female  :  1975  MRN:  [id number]  Visit Number:  [id number]    Date of Admission: 2023  Date of Discharge:  2023     PCP: Benigno Verduzco PA-C    DISCHARGE DIAGNOSIS  #Acute kidney injury with ATN due to prerenal azotemia/GI loss  #Proteinuria/hematuria  #EAEC/rotavirus gastroenteritis  #UTI, ruled out  #High anion gap metabolic acidosis due to JESÚS  #Hemoconcentration due to volume depletion  #Intractable nausea/vomiting  #Right ovarian cyst, 2.6 cm  #Hypothyroidism    CONSULTS   None    PROCEDURES PERFORMED  None    HOSPITAL COURSE  Patient is a 47 y.o. female [facility] complaining of nausea/vomiting and diarrhea.  Please see the admitting history and physical for further details.  Patient had viral gastroenteritis for 48 to 72 hours leading up to admission.  No history of CKD and BUN/creatinine on admission was 40/3.6.  CT abdomen and pelvis was negative for obstruction.  GI PCR positive for rotavirus/EAEC.  She received several liters of IV fluid bolus and started on bicarb/half-normal saline gtt.  With antiemetics, her symptoms resolved and renal function was approaching baseline at the time of discharge.  She was no longer nauseous and tolerated breakfast without difficulty.  Patient was discharged home with instructions to follow-up outpatient with PCP.    VITAL SIGNS:  Temp:  [98.1 °F (36.7 °C)-98.4 °F (36.9 °C)] 98.4 °F (36.9 °C)  Heart Rate:  [] 81  Resp:  [16-24] 16  BP: (109-178)/(74-90) 178/76  SpO2:  [97 %-99 %] 97 %  on   ;   Device (Oxygen Therapy): room air    Body mass index is 47.36 kg/m².  Wt Readings from Last 3 Encounters:   23 125 kg (275 lb 14.4 oz)   23 124 kg (273 lb)   22 120 kg (264 lb)       PHYSICAL EXAM:  Constitutional: Older female, nontoxic, Well-developed and well-nourished,  resting comfortably in bed, no acute distress.      HENT:  Head:  Normocephalic and atraumatic.  Mouth:  Moist mucous membranes.    Eyes:  Conjunctivae and EOM are normal. No scleral icterus.   Cardiovascular:  Normal rate, regular rhythm and normal heart sounds with no murmur. No JVD.   Pulmonary/Chest:  No respiratory distress, no wheezes, no crackles, with normal breath sounds and good air movement. Unlabored. No accessory muscle use.  Abdominal:  Soft. No distension and no tenderness.  Bowel sounds present. No rebound or guarding.   Musculoskeletal:  No tenderness, and no deformity.  No red or swollen joints anywhere.    Neurological:  Alert and oriented to person, place, and time.  No cranial nerve deficit.   Nonfocal.   Skin:  Skin is warm and dry. No rash noted. No pallor.   Peripheral vascular:  No clubbing, no cyanosis, no edema. Pedal and tibial pulses 2 out of 4 bilaterally.     DISCHARGE DISPOSITION   Stable    DISCHARGE MEDICATIONS:     Discharge Medications      New Medications      Instructions Start Date   promethazine 12.5 MG tablet  Commonly known as: PHENERGAN   12.5 mg, Oral, Every 6 Hours PRN         Continue These Medications      Instructions Start Date   atorvastatin 40 MG tablet  Commonly known as: LIPITOR   40 mg, Oral, Daily      busPIRone 15 MG tablet  Commonly known as: BUSPAR   15 mg, Oral, 2 Times Daily PRN      levothyroxine 75 MCG tablet  Commonly known as: SYNTHROID, LEVOTHROID   75 mcg, Oral, Daily      lisinopril 30 MG tablet  Commonly known as: PRINIVIL,ZESTRIL   30 mg, Oral, Daily      metFORMIN 500 MG tablet  Commonly known as: GLUCOPHAGE   500 mg, Oral, Every 12 Hours      metoprolol succinate XL 50 MG 24 hr tablet  Commonly known as: TOPROL-XL   50 mg, Oral, Daily      omeprazole 20 MG capsule  Commonly known as: priLOSEC   20 mg, Oral, Daily      pramipexole 0.25 MG tablet  Commonly known as: MIRAPEX   0.25 mg, Oral, Nightly             Diet Instructions    Resume diet as  tolerated          Activity Instructions    Resume activity as tolerated          Additional Instructions for the Follow-ups that You Need to Schedule     Discharge Follow-up with PCP   As directed       Currently Documented PCP:    Benigno Verduzco PA-C    PCP Phone Number:    [phone]     Follow Up Details: [address] [place] [address] fu            Follow-up Information     Benigno Verduzco PA-C .    Specialty: Physician Assistant  Why: [address] [place] [address]  Contact information:  [address]  [address]  [facility] [id number] [phone]             Benigno Verduzco PA-C Follow up in 1 week(s).    Specialty: Physician Assistant  Why: May 26th at 11:00  Contact information:  [address]  [address]  [facility] [id number] [phone]                          TEST  RESULTS PENDING AT DISCHARGE  Pending Labs     Order Current Status    Urine Culture - Urine, Urine, Clean Catch In process    Blood Culture - Blood, Arm, Left Preliminary result    Blood Culture - Blood, Wrist, Left Preliminary result           CODE STATUS  Code Status and Medical Interventions:   Ordered at: 05/17/23 1857     Code Status (Patient has no pulse and is not breathing):    CPR (Attempt to Resuscitate)     Medical Interventions (Patient has pulse or is breathing):    Full Support       The 10-year ASCVD risk score (Rome DK, et al., 2019) is: 4.8%    Values used to calculate the score:      Age: 47 years      Sex: Female      Is Non- : No      Diabetic: Yes      Tobacco smoker: No      Systolic Blood Pressure: 178 mmHg      Is BP treated: Yes      HDL Cholesterol: 43 mg/dL      Total Cholesterol: 141 mg/dL     Max Bullard DO  [facility] Hospitalist  05/19/23  13:35 EDT    Please note that this discharge summary required more than 30 minutes to complete.

## 2023-05-19 NOTE — CASE MANAGEMENT/SOCIAL WORK
Discharge Planning Assessment   [facility]     Patient Name: Arabella Rose  MRN: [id number]  Today's Date: 5/19/2023    Admit Date: 5/17/2023       Discharge Plan     Row Name 05/19/23 0835       Plan    Final Discharge Disposition Code 01 - home or self-care    Final Note Pt to be discharged home on this date.              DELMI Mclaughlin

## 2023-05-20 ENCOUNTER — READMISSION MANAGEMENT (OUTPATIENT)
Dept: CALL CENTER | Facility: HOSPITAL | Age: 48
End: 2023-05-20
Payer: MEDICAID

## 2023-05-20 LAB — BACTERIA SPEC AEROBE CULT: ABNORMAL

## 2023-05-20 NOTE — PAYOR COMM NOTE
"[facility]  LETICIA ALTMAN  PHONE  [phone]  FAX  [phone]  NPI:  [id number]    PATIENT D/C 5/19/2023    Jose Davis (47 y.o. Female)     Date of Birth   1975    Social Security Number       Address   [address] [place] [id number]    Home Phone   [phone]    MRN   [id number]       Anabaptist   Sikh    Marital Status                               Admission Date   5/17/23    Admission Type   Emergency    Admitting Provider   Max Bullard DO    Attending Provider       Department, Room/Bed   [facility], 3320/1P       Discharge Date   5/19/2023    Discharge Disposition   Home or Self Care    Discharge Destination                               Attending Provider: (none)   Allergies: Codeine, Prozac [Fluoxetine Hcl]    Isolation: None   Infection: Rotavirus (05/18/23)   Code Status: Prior    Ht: 162.6 cm (64\")   Wt: 125 kg (275 lb 14.4 oz)    Admission Cmt: None   Principal Problem: JESÚS (acute kidney injury) [N17.9]                 Active Insurance as of 5/17/2023     Primary Coverage     Payor Plan Insurance Group Employer/Plan Group    [facility] COMMUNITY PLAN [facility] COMMUNITY PLAN [facility]     Payor Plan Address Payor Plan Phone Number Payor Plan Fax Number Effective Dates    [address]   7/1/2021 - None Entered    [facility] [id number]       Subscriber Name Subscriber Birth Date Member ID       JOSE DAVIS 1975 [id number]                 Emergency Contacts      (Rel.) Home Phone Work Phone Mobile Phone    NASRIN MCNAMARA (Significant Other) [phone] -- --    ALEJANDROMICHELLE (Son) [phone] -- --              "

## 2023-05-20 NOTE — OUTREACH NOTE
Prep Survey      Flowsheet Row Responses   [facility] facility patient discharged from? [facility]   Is LACE score < 7 ? No   Eligibility Readm Mgmt   Discharge diagnosis JESÚS (acute kidney injury   Does the patient have one of the following disease processes/diagnoses(primary or secondary)? Other   Does the patient have Home health ordered? No   Is there a DME ordered? No   Prep survey completed? Yes            Swapna YAÑEZ - Registered Nurse

## 2023-05-23 ENCOUNTER — READMISSION MANAGEMENT (OUTPATIENT)
Dept: CALL CENTER | Facility: HOSPITAL | Age: 48
End: 2023-05-23
Payer: MEDICAID

## 2023-05-23 LAB
BACTERIA SPEC AEROBE CULT: NORMAL
BACTERIA SPEC AEROBE CULT: NORMAL

## 2023-05-23 NOTE — OUTREACH NOTE
Medical Week 1 Survey    Flowsheet Row Responses   [facility] patient discharged from? [facility]   Does the patient have one of the following disease processes/diagnoses(primary or secondary)? Other   Week 1 attempt successful? Yes   Call start time 0916   Call end time 0919   Discharge diagnosis JESÚS (acute kidney injury   Person spoke with today (if not patient) and relationship Shawn, son and pt   Meds reviewed with patient/caregiver? Yes   Is the patient having any side effects they believe may be caused by any medication additions or changes? No   Does the patient have all medications ordered at discharge? Yes   Is the patient taking all medications as directed (includes completed medication regime)? Yes   Does the patient have a primary care provider?  Yes   Does the patient have an appointment with their PCP within 7 days of discharge? Yes   Has the patient kept scheduled appointments due by today? N/A   Has home health visited the patient within 72 hours of discharge? N/A   Psychosocial issues? No   Did the patient receive a copy of their discharge instructions? Yes   Nursing interventions Reviewed instructions with patient   What is the patient's perception of their health status since discharge? Improving   Is the patient/caregiver able to teach back signs and symptoms related to disease process for when to call PCP? Yes   Is the patient/caregiver able to teach back signs and symptoms related to disease process for when to call 911? Yes   Is the patient/caregiver able to teach back the hierarchy of who to call/visit for symptoms/problems? PCP, Specialist, Home health nurse, Urgent Care, ED, 911 Yes   If the patient is a current smoker, are they able to teach back resources for cessation? Not a smoker   Additional teach back comments denies N or V   Week 1 call completed? Yes          Hiral BRONSON - Registered Nurse

## 2023-06-01 ENCOUNTER — READMISSION MANAGEMENT (OUTPATIENT)
Dept: CALL CENTER | Facility: HOSPITAL | Age: 48
End: 2023-06-01

## 2023-06-01 NOTE — OUTREACH NOTE
Medical Week 2 Survey    Flowsheet Row Responses   [facility] patient discharged from? [facility]   Does the patient have one of the following disease processes/diagnoses(primary or secondary)? Other   Week 2 attempt successful? Yes   Call start time 1015   Discharge diagnosis JESÚS (acute kidney injury   Call end time 1017   Meds reviewed with patient/caregiver? Yes   Is the patient having any side effects they believe may be caused by any medication additions or changes? No   Does the patient have all medications ordered at discharge? Yes   Is the patient taking all medications as directed (includes completed medication regime)? Yes   Does the patient have a primary care provider?  Yes   Does the patient have an appointment with their PCP within 7 days of discharge? Yes   Has the patient kept scheduled appointments due by today? Yes   Has home health visited the patient within 72 hours of discharge? N/A   Psychosocial issues? No   Did the patient receive a copy of their discharge instructions? Yes   Nursing interventions Reviewed instructions with patient   What is the patient's perception of their health status since discharge? Improving   Is the patient/caregiver able to teach back signs and symptoms related to disease process for when to call PCP? Yes   Is the patient/caregiver able to teach back signs and symptoms related to disease process for when to call 911? Yes   Is the patient/caregiver able to teach back the hierarchy of who to call/visit for symptoms/problems? PCP, Specialist, Home health nurse, Urgent Care, ED, 911 Yes   Additional teach back comments denies N or V   Week 2 Call Completed? Yes          Hiral BRONSON - Registered Nurse

## 2023-06-09 ENCOUNTER — READMISSION MANAGEMENT (OUTPATIENT)
Dept: CALL CENTER | Facility: HOSPITAL | Age: 48
End: 2023-06-09
Payer: MEDICAID

## 2023-06-09 NOTE — OUTREACH NOTE
Medical Week 3 Survey      Flowsheet Row Responses   [facility] [place] patient discharged from? [facility]   Does the patient have one of the following disease processes/diagnoses(primary or secondary)? Other   Week 3 attempt successful? Yes   Call start time 1646   Call end time 1649   Discharge diagnosis JESÚS, rotavirus gastroenteritis   Person spoke with today (if not patient) and relationship Patient   Meds reviewed with patient/caregiver? No  [No med review today]   Is the patient taking all medications as directed (includes completed medication regime)? Yes   Does the patient have a primary care provider?  Yes   Has the patient kept scheduled appointments due by today? Yes   Has home health visited the patient within 72 hours of discharge? N/A   Psychosocial issues? No   Did the patient receive a copy of their discharge instructions? Yes   What is the patient's perception of their health status since discharge? Improving   Week 3 Call Completed? Yes   Graduated Yes   Is the patient interested in additional calls from an ambulatory ?  NOTE:  applies to high risk patients requiring additional follow-up. No   Graduated/Revoked comments Brief call with patient. Reports doing well and has no questions or needs today.            JOE ROSALES - Registered Nurse

## 2023-07-19 ENCOUNTER — APPOINTMENT (OUTPATIENT)
Dept: CT IMAGING | Facility: HOSPITAL | Age: 48
DRG: 872 | End: 2023-07-19
Payer: MEDICAID

## 2023-07-19 ENCOUNTER — HOSPITAL ENCOUNTER (INPATIENT)
Facility: HOSPITAL | Age: 48
LOS: 3 days | Discharge: HOME | DRG: 872 | End: 2023-07-22
Attending: STUDENT IN AN ORGANIZED HEALTH CARE EDUCATION/TRAINING PROGRAM | Admitting: HOSPITALIST
Payer: MEDICAID

## 2023-07-19 DIAGNOSIS — N39.0 ACUTE UTI: ICD-10-CM

## 2023-07-19 DIAGNOSIS — N17.9 ACUTE RENAL FAILURE, UNSPECIFIED ACUTE RENAL FAILURE TYPE: Primary | ICD-10-CM

## 2023-07-19 DIAGNOSIS — E87.6 HYPOKALEMIA: ICD-10-CM

## 2023-07-19 LAB
ALBUMIN SERPL-MCNC: 4.1 G/DL (ref 3.5–5.2)
ALBUMIN/GLOB SERPL: 1.1 G/DL
ALP SERPL-CCNC: 79 U/L (ref 39–117)
ALT SERPL W P-5'-P-CCNC: 35 U/L (ref 1–33)
ANION GAP SERPL CALCULATED.3IONS-SCNC: 16.7 MMOL/L (ref 5–15)
AST SERPL-CCNC: 24 U/L (ref 1–32)
BACTERIA UR QL AUTO: ABNORMAL /HPF
BASOPHILS # BLD AUTO: 0.08 10*3/MM3 (ref 0–0.2)
BASOPHILS NFR BLD AUTO: 0.6 % (ref 0–1.5)
BILIRUB SERPL-MCNC: 0.6 MG/DL (ref 0–1.2)
BILIRUB UR QL STRIP: ABNORMAL
BUN SERPL-MCNC: 21 MG/DL (ref 6–20)
BUN/CREAT SERPL: 4 (ref 7–25)
CALCIUM SPEC-SCNC: 9.9 MG/DL (ref 8.6–10.5)
CHLORIDE SERPL-SCNC: 97 MMOL/L (ref 98–107)
CK SERPL-CCNC: 193 U/L (ref 20–180)
CLARITY UR: ABNORMAL
CO2 SERPL-SCNC: 22.3 MMOL/L (ref 22–29)
COLOR UR: ABNORMAL
CREAT SERPL-MCNC: 5.2 MG/DL (ref 0.57–1)
CRP SERPL-MCNC: 1.48 MG/DL (ref 0–0.5)
DEPRECATED RDW RBC AUTO: 39.9 FL (ref 37–54)
EGFRCR SERPLBLD CKD-EPI 2021: 9.5 ML/MIN/1.73
EOSINOPHIL # BLD AUTO: 0.15 10*3/MM3 (ref 0–0.4)
EOSINOPHIL NFR BLD AUTO: 1.2 % (ref 0.3–6.2)
ERYTHROCYTE [DISTWIDTH] IN BLOOD BY AUTOMATED COUNT: 13.2 % (ref 12.3–15.4)
ERYTHROCYTE [SEDIMENTATION RATE] IN BLOOD: 17 MM/HR (ref 0–20)
FLUAV RNA RESP QL NAA+PROBE: NOT DETECTED
FLUBV RNA RESP QL NAA+PROBE: NOT DETECTED
GEN 5 2HR TROPONIN T REFLEX: 27 NG/L
GLOBULIN UR ELPH-MCNC: 3.6 GM/DL
GLUCOSE SERPL-MCNC: 130 MG/DL (ref 65–99)
GLUCOSE UR STRIP-MCNC: NEGATIVE MG/DL
HCT VFR BLD AUTO: 39.3 % (ref 34–46.6)
HGB BLD-MCNC: 13.1 G/DL (ref 12–15.9)
HGB UR QL STRIP.AUTO: NEGATIVE
HYALINE CASTS UR QL AUTO: ABNORMAL /LPF
IMM GRANULOCYTES # BLD AUTO: 0.26 10*3/MM3 (ref 0–0.05)
IMM GRANULOCYTES NFR BLD AUTO: 2 % (ref 0–0.5)
KETONES UR QL STRIP: ABNORMAL
LEUKOCYTE ESTERASE UR QL STRIP.AUTO: ABNORMAL
LYMPHOCYTES # BLD AUTO: 3.67 10*3/MM3 (ref 0.7–3.1)
LYMPHOCYTES NFR BLD AUTO: 28.4 % (ref 19.6–45.3)
MAGNESIUM SERPL-MCNC: 1.9 MG/DL (ref 1.6–2.6)
MCH RBC QN AUTO: 27.8 PG (ref 26.6–33)
MCHC RBC AUTO-ENTMCNC: 33.3 G/DL (ref 31.5–35.7)
MCV RBC AUTO: 83.4 FL (ref 79–97)
MONOCYTES # BLD AUTO: 1.13 10*3/MM3 (ref 0.1–0.9)
MONOCYTES NFR BLD AUTO: 8.7 % (ref 5–12)
NEUTROPHILS NFR BLD AUTO: 59.1 % (ref 42.7–76)
NEUTROPHILS NFR BLD AUTO: 7.64 10*3/MM3 (ref 1.7–7)
NITRITE UR QL STRIP: NEGATIVE
NRBC BLD AUTO-RTO: 0 /100 WBC (ref 0–0.2)
NT-PROBNP SERPL-MCNC: 83.4 PG/ML (ref 0–900)
PH UR STRIP.AUTO: <=5 [PH] (ref 5–8)
PLATELET # BLD AUTO: 496 10*3/MM3 (ref 140–450)
PMV BLD AUTO: 8.7 FL (ref 6–12)
POTASSIUM SERPL-SCNC: 3.3 MMOL/L (ref 3.5–5.2)
PROT SERPL-MCNC: 7.7 G/DL (ref 6–8.5)
PROT UR QL STRIP: ABNORMAL
RBC # BLD AUTO: 4.71 10*6/MM3 (ref 3.77–5.28)
RBC # UR STRIP: ABNORMAL /HPF
REF LAB TEST METHOD: ABNORMAL
SARS-COV-2 RNA RESP QL NAA+PROBE: NOT DETECTED
SODIUM SERPL-SCNC: 136 MMOL/L (ref 136–145)
SP GR UR STRIP: 1.02 (ref 1–1.03)
SQUAMOUS #/AREA URNS HPF: ABNORMAL /HPF
T4 FREE SERPL-MCNC: 1.82 NG/DL (ref 0.93–1.7)
TROPONIN T DELTA: -2 NG/L
TROPONIN T SERPL HS-MCNC: 29 NG/L
TSH SERPL DL<=0.05 MIU/L-ACNC: 1.32 UIU/ML (ref 0.27–4.2)
UROBILINOGEN UR QL STRIP: ABNORMAL
WBC # UR STRIP: ABNORMAL /HPF
WBC NRBC COR # BLD: 12.93 10*3/MM3 (ref 3.4–10.8)

## 2023-07-19 PROCEDURE — 84439 ASSAY OF FREE THYROXINE: CPT | Performed by: EMERGENCY MEDICINE

## 2023-07-19 PROCEDURE — 87636 SARSCOV2 & INF A&B AMP PRB: CPT | Performed by: STUDENT IN AN ORGANIZED HEALTH CARE EDUCATION/TRAINING PROGRAM

## 2023-07-19 PROCEDURE — 85025 COMPLETE CBC W/AUTO DIFF WBC: CPT | Performed by: EMERGENCY MEDICINE

## 2023-07-19 PROCEDURE — 83605 ASSAY OF LACTIC ACID: CPT | Performed by: HOSPITALIST

## 2023-07-19 PROCEDURE — 84443 ASSAY THYROID STIM HORMONE: CPT | Performed by: EMERGENCY MEDICINE

## 2023-07-19 PROCEDURE — 83880 ASSAY OF NATRIURETIC PEPTIDE: CPT | Performed by: EMERGENCY MEDICINE

## 2023-07-19 PROCEDURE — 85652 RBC SED RATE AUTOMATED: CPT | Performed by: EMERGENCY MEDICINE

## 2023-07-19 PROCEDURE — 99223 1ST HOSP IP/OBS HIGH 75: CPT

## 2023-07-19 PROCEDURE — 99284 EMERGENCY DEPT VISIT MOD MDM: CPT

## 2023-07-19 PROCEDURE — 81001 URINALYSIS AUTO W/SCOPE: CPT | Performed by: EMERGENCY MEDICINE

## 2023-07-19 PROCEDURE — 87040 BLOOD CULTURE FOR BACTERIA: CPT | Performed by: HOSPITALIST

## 2023-07-19 PROCEDURE — 82550 ASSAY OF CK (CPK): CPT | Performed by: HOSPITALIST

## 2023-07-19 PROCEDURE — 84484 ASSAY OF TROPONIN QUANT: CPT | Performed by: EMERGENCY MEDICINE

## 2023-07-19 PROCEDURE — 93010 ELECTROCARDIOGRAM REPORT: CPT | Performed by: INTERNAL MEDICINE

## 2023-07-19 PROCEDURE — 86140 C-REACTIVE PROTEIN: CPT | Performed by: EMERGENCY MEDICINE

## 2023-07-19 PROCEDURE — 93005 ELECTROCARDIOGRAM TRACING: CPT | Performed by: HOSPITALIST

## 2023-07-19 PROCEDURE — 80053 COMPREHEN METABOLIC PANEL: CPT | Performed by: EMERGENCY MEDICINE

## 2023-07-19 PROCEDURE — 87086 URINE CULTURE/COLONY COUNT: CPT | Performed by: EMERGENCY MEDICINE

## 2023-07-19 PROCEDURE — 74176 CT ABD & PELVIS W/O CONTRAST: CPT

## 2023-07-19 PROCEDURE — 83735 ASSAY OF MAGNESIUM: CPT | Performed by: EMERGENCY MEDICINE

## 2023-07-19 PROCEDURE — 36415 COLL VENOUS BLD VENIPUNCTURE: CPT

## 2023-07-19 RX ORDER — POTASSIUM CHLORIDE 20 MEQ/1
40 TABLET, EXTENDED RELEASE ORAL ONCE
Status: COMPLETED | OUTPATIENT
Start: 2023-07-19 | End: 2023-07-19

## 2023-07-19 RX ADMIN — SODIUM CHLORIDE 1000 ML: 9 INJECTION, SOLUTION INTRAVENOUS at 21:36

## 2023-07-19 RX ADMIN — POTASSIUM CHLORIDE 40 MEQ: 1500 TABLET, EXTENDED RELEASE ORAL at 21:36

## 2023-07-19 NOTE — ED NOTES
MEDICAL SCREENING:    Reason for Visit: Urinary Symptoms    Patient initially seen in triage.  The patient was advised further evaluation and diagnostic testing will be needed, some of the treatment and testing will be initiated in the lobby in order to begin the process.  The patient will be returned to the waiting area for the time being and possibly be re-assessed by a subsequent ED provider.  The patient will be brought back to the treatment area in as timely manner as possible.      Fernando Bellamy MD  07/19/23 1605

## 2023-07-20 LAB
ADV 40+41 DNA STL QL NAA+NON-PROBE: NOT DETECTED
ALBUMIN SERPL-MCNC: 3.5 G/DL (ref 3.5–5.2)
ALBUMIN/GLOB SERPL: 1 G/DL
ALP SERPL-CCNC: 70 U/L (ref 39–117)
ALT SERPL W P-5'-P-CCNC: 32 U/L (ref 1–33)
ANION GAP SERPL CALCULATED.3IONS-SCNC: 15.2 MMOL/L (ref 5–15)
AST SERPL-CCNC: 21 U/L (ref 1–32)
ASTRO TYP 1-8 RNA STL QL NAA+NON-PROBE: NOT DETECTED
BASOPHILS # BLD AUTO: 0.07 10*3/MM3 (ref 0–0.2)
BASOPHILS NFR BLD AUTO: 0.5 % (ref 0–1.5)
BILIRUB SERPL-MCNC: 0.5 MG/DL (ref 0–1.2)
BUN SERPL-MCNC: 21 MG/DL (ref 6–20)
BUN/CREAT SERPL: 4.1 (ref 7–25)
C CAYETANENSIS DNA STL QL NAA+NON-PROBE: NOT DETECTED
C COLI+JEJ+UPSA DNA STL QL NAA+NON-PROBE: NOT DETECTED
CALCIUM SPEC-SCNC: 9.1 MG/DL (ref 8.6–10.5)
CHLORIDE SERPL-SCNC: 98 MMOL/L (ref 98–107)
CO2 SERPL-SCNC: 19.8 MMOL/L (ref 22–29)
CREAT SERPL-MCNC: 5.15 MG/DL (ref 0.57–1)
CRYPTOSP DNA STL QL NAA+NON-PROBE: NOT DETECTED
D-LACTATE SERPL-SCNC: 1 MMOL/L (ref 0.5–2)
DEPRECATED RDW RBC AUTO: 41.3 FL (ref 37–54)
E HISTOLYT DNA STL QL NAA+NON-PROBE: NOT DETECTED
EAEC PAA PLAS AGGR+AATA ST NAA+NON-PRB: NOT DETECTED
EC STX1+STX2 GENES STL QL NAA+NON-PROBE: NOT DETECTED
EGFRCR SERPLBLD CKD-EPI 2021: 9.6 ML/MIN/1.73
EOSINOPHIL # BLD AUTO: 0.18 10*3/MM3 (ref 0–0.4)
EOSINOPHIL NFR BLD AUTO: 1.4 % (ref 0.3–6.2)
EPEC EAE GENE STL QL NAA+NON-PROBE: NOT DETECTED
ERYTHROCYTE [DISTWIDTH] IN BLOOD BY AUTOMATED COUNT: 13.4 % (ref 12.3–15.4)
ETEC LTA+ST1A+ST1B TOX ST NAA+NON-PROBE: NOT DETECTED
G LAMBLIA DNA STL QL NAA+NON-PROBE: NOT DETECTED
GLOBULIN UR ELPH-MCNC: 3.4 GM/DL
GLUCOSE BLDC GLUCOMTR-MCNC: 112 MG/DL (ref 70–130)
GLUCOSE BLDC GLUCOMTR-MCNC: 114 MG/DL (ref 70–130)
GLUCOSE BLDC GLUCOMTR-MCNC: 142 MG/DL (ref 70–130)
GLUCOSE BLDC GLUCOMTR-MCNC: 150 MG/DL (ref 70–130)
GLUCOSE SERPL-MCNC: 124 MG/DL (ref 65–99)
HBA1C MFR BLD: 6 % (ref 4.8–5.6)
HCT VFR BLD AUTO: 35.7 % (ref 34–46.6)
HGB BLD-MCNC: 11.5 G/DL (ref 12–15.9)
IMM GRANULOCYTES # BLD AUTO: 0.28 10*3/MM3 (ref 0–0.05)
IMM GRANULOCYTES NFR BLD AUTO: 2.2 % (ref 0–0.5)
LYMPHOCYTES # BLD AUTO: 3.92 10*3/MM3 (ref 0.7–3.1)
LYMPHOCYTES NFR BLD AUTO: 30.8 % (ref 19.6–45.3)
MCH RBC QN AUTO: 27.2 PG (ref 26.6–33)
MCHC RBC AUTO-ENTMCNC: 32.2 G/DL (ref 31.5–35.7)
MCV RBC AUTO: 84.4 FL (ref 79–97)
MONOCYTES # BLD AUTO: 1.25 10*3/MM3 (ref 0.1–0.9)
MONOCYTES NFR BLD AUTO: 9.8 % (ref 5–12)
NEUTROPHILS NFR BLD AUTO: 55.3 % (ref 42.7–76)
NEUTROPHILS NFR BLD AUTO: 7.03 10*3/MM3 (ref 1.7–7)
NOROVIRUS GI+II RNA STL QL NAA+NON-PROBE: NOT DETECTED
NRBC BLD AUTO-RTO: 0 /100 WBC (ref 0–0.2)
P SHIGELLOIDES DNA STL QL NAA+NON-PROBE: NOT DETECTED
PLATELET # BLD AUTO: 395 10*3/MM3 (ref 140–450)
PMV BLD AUTO: 8.9 FL (ref 6–12)
POTASSIUM SERPL-SCNC: 3.1 MMOL/L (ref 3.5–5.2)
PROT SERPL-MCNC: 6.9 G/DL (ref 6–8.5)
QT INTERVAL: 394 MS
QTC INTERVAL: 462 MS
RBC # BLD AUTO: 4.23 10*6/MM3 (ref 3.77–5.28)
RVA RNA STL QL NAA+NON-PROBE: NOT DETECTED
S ENT+BONG DNA STL QL NAA+NON-PROBE: DETECTED
SAPO I+II+IV+V RNA STL QL NAA+NON-PROBE: NOT DETECTED
SHIGELLA SP+EIEC IPAH ST NAA+NON-PROBE: NOT DETECTED
SODIUM SERPL-SCNC: 133 MMOL/L (ref 136–145)
V CHOL+PARA+VUL DNA STL QL NAA+NON-PROBE: NOT DETECTED
V CHOLERAE DNA STL QL NAA+NON-PROBE: NOT DETECTED
WBC NRBC COR # BLD: 12.73 10*3/MM3 (ref 3.4–10.8)
Y ENTEROCOL DNA STL QL NAA+NON-PROBE: NOT DETECTED

## 2023-07-20 PROCEDURE — 25010000002 CEFTRIAXONE PER 250 MG: Performed by: NURSE PRACTITIONER

## 2023-07-20 PROCEDURE — 82948 REAGENT STRIP/BLOOD GLUCOSE: CPT

## 2023-07-20 PROCEDURE — 94761 N-INVAS EAR/PLS OXIMETRY MLT: CPT

## 2023-07-20 PROCEDURE — 84156 ASSAY OF PROTEIN URINE: CPT | Performed by: INTERNAL MEDICINE

## 2023-07-20 PROCEDURE — 99222 1ST HOSP IP/OBS MODERATE 55: CPT | Performed by: NURSE PRACTITIONER

## 2023-07-20 PROCEDURE — 25010000002 HEPARIN (PORCINE) PER 1000 UNITS: Performed by: HOSPITALIST

## 2023-07-20 PROCEDURE — 94799 UNLISTED PULMONARY SVC/PX: CPT

## 2023-07-20 PROCEDURE — 25010000002 CEFTRIAXONE PER 250 MG: Performed by: HOSPITALIST

## 2023-07-20 PROCEDURE — 87507 IADNA-DNA/RNA PROBE TQ 12-25: CPT

## 2023-07-20 PROCEDURE — 82570 ASSAY OF URINE CREATININE: CPT | Performed by: INTERNAL MEDICINE

## 2023-07-20 PROCEDURE — 85025 COMPLETE CBC W/AUTO DIFF WBC: CPT | Performed by: HOSPITALIST

## 2023-07-20 PROCEDURE — 80053 COMPREHEN METABOLIC PANEL: CPT | Performed by: HOSPITALIST

## 2023-07-20 PROCEDURE — 99232 SBSQ HOSP IP/OBS MODERATE 35: CPT

## 2023-07-20 PROCEDURE — 83036 HEMOGLOBIN GLYCOSYLATED A1C: CPT

## 2023-07-20 RX ORDER — ATORVASTATIN CALCIUM 40 MG/1
40 TABLET, FILM COATED ORAL DAILY
Status: DISCONTINUED | OUTPATIENT
Start: 2023-07-20 | End: 2023-07-22 | Stop reason: HOSPADM

## 2023-07-20 RX ORDER — METOPROLOL SUCCINATE 50 MG/1
50 TABLET, EXTENDED RELEASE ORAL DAILY
Status: DISCONTINUED | OUTPATIENT
Start: 2023-07-20 | End: 2023-07-22 | Stop reason: HOSPADM

## 2023-07-20 RX ORDER — SODIUM CHLORIDE 0.9 % (FLUSH) 0.9 %
10 SYRINGE (ML) INJECTION AS NEEDED
Status: DISCONTINUED | OUTPATIENT
Start: 2023-07-20 | End: 2023-07-22 | Stop reason: HOSPADM

## 2023-07-20 RX ORDER — SODIUM CHLORIDE 9 MG/ML
100 INJECTION, SOLUTION INTRAVENOUS CONTINUOUS
Status: DISCONTINUED | OUTPATIENT
Start: 2023-07-20 | End: 2023-07-20

## 2023-07-20 RX ORDER — HEPARIN SODIUM 5000 [USP'U]/ML
5000 INJECTION, SOLUTION INTRAVENOUS; SUBCUTANEOUS EVERY 12 HOURS SCHEDULED
Status: DISCONTINUED | OUTPATIENT
Start: 2023-07-20 | End: 2023-07-22 | Stop reason: HOSPADM

## 2023-07-20 RX ORDER — AZITHROMYCIN 250 MG/1
500 TABLET, FILM COATED ORAL
Status: DISCONTINUED | OUTPATIENT
Start: 2023-07-21 | End: 2023-07-20

## 2023-07-20 RX ORDER — LEVOTHYROXINE SODIUM 0.07 MG/1
75 TABLET ORAL DAILY
Status: DISCONTINUED | OUTPATIENT
Start: 2023-07-20 | End: 2023-07-22 | Stop reason: HOSPADM

## 2023-07-20 RX ORDER — PANTOPRAZOLE SODIUM 40 MG/1
40 TABLET, DELAYED RELEASE ORAL
Status: DISCONTINUED | OUTPATIENT
Start: 2023-07-20 | End: 2023-07-22 | Stop reason: HOSPADM

## 2023-07-20 RX ORDER — DOXEPIN HYDROCHLORIDE 10 MG/1
10 CAPSULE ORAL NIGHTLY
Status: DISCONTINUED | OUTPATIENT
Start: 2023-07-20 | End: 2023-07-22 | Stop reason: HOSPADM

## 2023-07-20 RX ORDER — POLYETHYLENE GLYCOL 3350 17 G/17G
17 POWDER, FOR SOLUTION ORAL DAILY PRN
Status: DISCONTINUED | OUTPATIENT
Start: 2023-07-20 | End: 2023-07-22 | Stop reason: HOSPADM

## 2023-07-20 RX ORDER — SODIUM CHLORIDE 9 MG/ML
40 INJECTION, SOLUTION INTRAVENOUS AS NEEDED
Status: DISCONTINUED | OUTPATIENT
Start: 2023-07-20 | End: 2023-07-22 | Stop reason: HOSPADM

## 2023-07-20 RX ORDER — PRAMIPEXOLE DIHYDROCHLORIDE 0.12 MG/1
0.25 TABLET ORAL NIGHTLY
Status: CANCELLED | OUTPATIENT
Start: 2023-07-20

## 2023-07-20 RX ORDER — LISINOPRIL 40 MG/1
40 TABLET ORAL DAILY
Status: ON HOLD | COMMUNITY
End: 2023-07-22 | Stop reason: SDUPTHER

## 2023-07-20 RX ORDER — BISACODYL 10 MG
10 SUPPOSITORY, RECTAL RECTAL DAILY PRN
Status: DISCONTINUED | OUTPATIENT
Start: 2023-07-20 | End: 2023-07-22 | Stop reason: HOSPADM

## 2023-07-20 RX ORDER — BISACODYL 5 MG/1
5 TABLET, DELAYED RELEASE ORAL DAILY PRN
Status: DISCONTINUED | OUTPATIENT
Start: 2023-07-20 | End: 2023-07-22 | Stop reason: HOSPADM

## 2023-07-20 RX ORDER — AMOXICILLIN 250 MG
2 CAPSULE ORAL 2 TIMES DAILY
Status: DISCONTINUED | OUTPATIENT
Start: 2023-07-20 | End: 2023-07-22 | Stop reason: HOSPADM

## 2023-07-20 RX ORDER — SODIUM CHLORIDE 0.9 % (FLUSH) 0.9 %
10 SYRINGE (ML) INJECTION EVERY 12 HOURS SCHEDULED
Status: DISCONTINUED | OUTPATIENT
Start: 2023-07-20 | End: 2023-07-22 | Stop reason: HOSPADM

## 2023-07-20 RX ORDER — LISINOPRIL 10 MG/1
40 TABLET ORAL DAILY
Status: CANCELLED | OUTPATIENT
Start: 2023-07-20

## 2023-07-20 RX ORDER — DOXEPIN HYDROCHLORIDE 10 MG/1
10 CAPSULE ORAL NIGHTLY
COMMUNITY

## 2023-07-20 RX ORDER — AZITHROMYCIN 250 MG/1
1000 TABLET, FILM COATED ORAL
Status: COMPLETED | OUTPATIENT
Start: 2023-07-20 | End: 2023-07-20

## 2023-07-20 RX ADMIN — DOXEPIN HYDROCHLORIDE 10 MG: 10 CAPSULE ORAL at 20:14

## 2023-07-20 RX ADMIN — ATORVASTATIN CALCIUM 40 MG: 40 TABLET, FILM COATED ORAL at 09:37

## 2023-07-20 RX ADMIN — HEPARIN SODIUM 5000 UNITS: 5000 INJECTION INTRAVENOUS; SUBCUTANEOUS at 01:13

## 2023-07-20 RX ADMIN — Medication 10 ML: at 09:39

## 2023-07-20 RX ADMIN — SODIUM CHLORIDE 100 ML/HR: 9 INJECTION, SOLUTION INTRAVENOUS at 01:12

## 2023-07-20 RX ADMIN — HEPARIN SODIUM 5000 UNITS: 5000 INJECTION INTRAVENOUS; SUBCUTANEOUS at 09:37

## 2023-07-20 RX ADMIN — LEVOTHYROXINE SODIUM 75 MCG: 0.07 TABLET ORAL at 09:37

## 2023-07-20 RX ADMIN — Medication 10 ML: at 01:13

## 2023-07-20 RX ADMIN — METOPROLOL SUCCINATE 50 MG: 50 TABLET, EXTENDED RELEASE ORAL at 09:37

## 2023-07-20 RX ADMIN — CEFTRIAXONE 1000 MG: 1 INJECTION, POWDER, FOR SOLUTION INTRAMUSCULAR; INTRAVENOUS at 00:06

## 2023-07-20 RX ADMIN — AZITHROMYCIN 1000 MG: 250 TABLET, FILM COATED ORAL at 05:12

## 2023-07-20 RX ADMIN — CEFTRIAXONE 2000 MG: 2 INJECTION, POWDER, FOR SOLUTION INTRAMUSCULAR; INTRAVENOUS at 17:55

## 2023-07-20 RX ADMIN — Medication 10 ML: at 20:14

## 2023-07-20 RX ADMIN — HEPARIN SODIUM 5000 UNITS: 5000 INJECTION INTRAVENOUS; SUBCUTANEOUS at 20:13

## 2023-07-20 RX ADMIN — PANTOPRAZOLE SODIUM 40 MG: 40 TABLET, DELAYED RELEASE ORAL at 05:12

## 2023-07-20 RX ADMIN — SODIUM BICARBONATE 75 MEQ: 84 INJECTION INTRAVENOUS at 11:02

## 2023-07-20 RX ADMIN — DOCUSATE SODIUM 50 MG AND SENNOSIDES 8.6 MG 2 TABLET: 8.6; 5 TABLET, FILM COATED ORAL at 20:14

## 2023-07-20 NOTE — H&P
[facility] Medicine Services  HISTORY & PHYSICAL    Patient Identification:  Name:  Arabella Rose  Age:  48 y.o.  Sex:  female  :  1975  MRN:  [id number]   Visit Number:  [id number]  Admit Date: 2023   Primary Care Physician:  Benigno Verduzco PA-C     Subjective     Chief complaint:   Chief Complaint   Patient presents with    Urinary Retention     History of presenting illness:   Patient is a 48 y.o. female with past medical history significant for type II non-insulin-dependent diabetes mellitus, history of PE, hypertension, anxiety/depression, hypothyroidism, GERD and hyperlipidemia that presented to the [facility] emergency department for evaluation of elevated creatinine.  Patient was seen at an outside facility 6 days ago for an JESÚS and received a 2 L fluid bolus at that time.  On presentation at that facility, her creatinine was initially 1.6, improved to 1.37 after fluids. Patient followed up with her PCP, who sent her to the ED due to her creatinine being elevated.  Arrival to ED creatinine was 5.20. Patient endorses mild abdominal cramping but no other symptoms.  Denies fever, flank pain, dysuria, nausea, vomiting.  Patient was admitted to our service on 2023 for JESÚS secondary to gastroenteritis.    Patient examined at bedside. Patient states when she was visiting family around the  in [place], they cooked clams and she began having nausea, vomiting, and diarrhea shortly after. This led her to going to the ED 6 days ago in [place]. She states since then she has not had any nausea or vomiting, but she is still having abdominal cramping and watery diarrhea, which she states have been improving. Over the past few days she has had approximately 3 episodes of diarrhea a day. She states she has been drinking a lot of fluids including water and Pedialyte. She states she has noticed a decrease in urine output, only urinating 2-3  times today, each being a very small amount.     Upon arrival to the ED, vitals were temperature 97, , RR 18, /77, SPO2 96% on room air.  Labs significant for, potassium 3.3, anion gap 16.7, BUN 21, creatinine 5.2, GFR 9.5, glucose 130, WBC 12.93, platelets 496, CRP 1.48.  UA dark yellow, cloudy, 1+ ketones, trace leukocytes, 2+ protein, 1+ bilirubin, WBCs 6-12, bacteria 2+, squamous epithelial cells 7-12.     CT abdomen/pelvis shows no acute abnormality.    Patient has been admitted to the [facility] unit.  ---------------------------------------------------------------------------------------------------------------------   Review of Systems   Constitutional:  Negative for chills, diaphoresis, fatigue and fever.   Respiratory:  Negative for cough, shortness of breath and wheezing.    Cardiovascular:  Negative for chest pain, palpitations and leg swelling.   Gastrointestinal:  Positive for abdominal pain and diarrhea. Negative for constipation, nausea and vomiting.   Genitourinary:  Positive for decreased urine volume. Negative for difficulty urinating, dysuria and flank pain.   Musculoskeletal:  Negative for arthralgias, myalgias and neck stiffness.   Skin:  Negative for rash and wound.   Neurological:  Negative for dizziness, weakness and light-headedness.   Psychiatric/Behavioral:  Negative for agitation and confusion.     ---------------------------------------------------------------------------------------------------------------------   Past Medical History:   Diagnosis Date    Anxiety     Depression     Diabetes mellitus     High cholesterol     History of pulmonary embolus (PE)     Hypertension     Hypothyroidism     Sleep apnea      Past Surgical History:   Procedure Laterality Date    CHOLECYSTECTOMY      EYE SURGERY      PULMONARY EMBOLISM SURGERY       Family History   Problem Relation Age of Onset    Stroke Mother     Diabetes Father     Heart disease Father     Hypertension Father       Social History     Socioeconomic History    Marital status:    Tobacco Use    Smoking status: Never    Smokeless tobacco: Never   Vaping Use    Vaping Use: Never used   Substance and Sexual Activity    Alcohol use: No    Drug use: No    Sexual activity: Defer     ---------------------------------------------------------------------------------------------------------------------   Allergies:  Codeine and Prozac [fluoxetine hcl]  ---------------------------------------------------------------------------------------------------------------------   Medications below are reported home medications pulling from within the system; at this time, these medications have not been reconciled unless otherwise specified and are in the verification process for further verifcation as current home medications.    Prior to Admission Medications       Prescriptions Last Dose Informant Patient Reported? Taking?    atorvastatin (LIPITOR) 40 MG tablet  Pharmacy Yes No    Take 1 tablet by mouth Daily.    busPIRone (BUSPAR) 15 MG tablet  Pharmacy Yes No    Take 1 tablet by mouth 2 (Two) Times a Day As Needed (anxiety).    levothyroxine (SYNTHROID, LEVOTHROID) 75 MCG tablet  Pharmacy Yes No    Take 1 tablet by mouth Daily.    lisinopril (PRINIVIL,ZESTRIL) 30 MG tablet  Pharmacy Yes No    Take 1 tablet by mouth Daily.    metFORMIN (GLUCOPHAGE) 500 MG tablet  Pharmacy Yes No    Take 1 tablet by mouth Every 12 (Twelve) Hours.    metoprolol succinate XL (TOPROL-XL) 50 MG 24 hr tablet  Pharmacy Yes No    Take 1 tablet by mouth Daily.    omeprazole (priLOSEC) 20 MG capsule  Pharmacy Yes No    Take 1 capsule by mouth Daily.    pramipexole (MIRAPEX) 0.25 MG tablet  Pharmacy Yes No    Take 1 tablet by mouth Every Night.    promethazine (PHENERGAN) 12.5 MG tablet   No No    Take 1 tablet by mouth Every 6 (Six) Hours As Needed for Nausea or Vomiting.           ---------------------------------------------------------------------------------------------------------------------    Objective     Hospital Scheduled Meds:  cefTRIAXone, 1,000 mg, Intravenous, Q24H  heparin (porcine), 5,000 Units, Subcutaneous, Q12H  senna-docusate sodium, 2 tablet, Oral, BID  sodium chloride, 10 mL, Intravenous, Q12H      sodium chloride, 100 mL/hr, Last Rate: 100 mL/hr (07/20/23 0112)        Current listed hospital scheduled medications may not yet reflect those currently placed in orders that are signed and held, awaiting patient's arrival to floor/unit.    ---------------------------------------------------------------------------------------------------------------------   Vital Signs:  Temp:  [97 °F (36.1 °C)-97.7 °F (36.5 °C)] 97.7 °F (36.5 °C)  Heart Rate:  [] 91  Resp:  [18-20] 20  BP: (110-145)/(75-77) 123/77  No data found.  SpO2 Percentage    07/19/23 1702 07/19/23 2309 07/20/23 0032   SpO2: 96% 92% 96%     SpO2:  [92 %-96 %] 96 %  on   ;   Device (Oxygen Therapy): room air    Body mass index is 45.81 kg/m².  Wt Readings from Last 3 Encounters:   07/20/23 121 kg (267 lb)   05/19/23 125 kg (275 lb 14.4 oz)   02/12/23 124 kg (273 lb)     ---------------------------------------------------------------------------------------------------------------------   Physical Exam:\  Physical Exam  Constitutional:       General: She is not in acute distress.     Appearance: Normal appearance.   HENT:      Head: Normocephalic and atraumatic.      Right Ear: External ear normal.      Left Ear: External ear normal.      Nose: No nasal deformity.      Mouth/Throat:      Lips: Pink.      Mouth: Mucous membranes are moist.   Eyes:      Conjunctiva/sclera: Conjunctivae normal.      Pupils: Pupils are equal, round, and reactive to light.   Cardiovascular:      Rate and Rhythm: Normal rate and regular rhythm.      Pulses:           Dorsalis pedis pulses are 2+ on the right side and 2+ on the left  side.      Heart sounds: Normal heart sounds.   Pulmonary:      Effort: Pulmonary effort is normal.      Breath sounds: Normal breath sounds. No wheezing, rhonchi or rales.   Abdominal:      General: Abdomen is flat. Bowel sounds are normal.      Palpations: Abdomen is soft.      Tenderness: There is no guarding or rebound.   Genitourinary:     Comments: No moreland catheter in place   Musculoskeletal:      Cervical back: Neck supple. Normal range of motion.      Right lower leg: No edema.      Left lower leg: No edema.   Skin:     General: Skin is warm and dry.   Neurological:      General: No focal deficit present.      Mental Status: She is alert and oriented to person, place, and time.   Psychiatric:         Mood and Affect: Mood normal.         Behavior: Behavior normal.     ---------------------------------------------------------------------------------------------------------------------    ---------------------------------------------------------------------------------------------------------------------   Results from last 7 days   Lab Units 07/19/23 2025 07/19/23  1803   CK TOTAL U/L 193*  --    HSTROP T ng/L 27* 29*     Results from last 7 days   Lab Units 07/19/23  1803   PROBNP pg/mL 83.4         Results from last 7 days   Lab Units 07/19/23  2338 07/19/23  1803   CRP mg/dL  --  1.48*   LACTATE mmol/L 1.0  --    WBC 10*3/mm3  --  12.93*   HEMOGLOBIN g/dL  --  13.1   HEMATOCRIT %  --  39.3   MCV fL  --  83.4   MCHC g/dL  --  33.3   PLATELETS 10*3/mm3  --  496*     Results from last 7 days   Lab Units 07/19/23  1803   SODIUM mmol/L 136   POTASSIUM mmol/L 3.3*   MAGNESIUM mg/dL 1.9   CHLORIDE mmol/L 97*   CO2 mmol/L 22.3   BUN mg/dL 21*   CREATININE mg/dL 5.20*   CALCIUM mg/dL 9.9   GLUCOSE mg/dL 130*   ALBUMIN g/dL 4.1   BILIRUBIN mg/dL 0.6   ALK PHOS U/L 79   AST (SGOT) U/L 24   ALT (SGPT) U/L 35*   Estimated Creatinine Clearance: 16.6 mL/min (A) (by C-G formula based on SCr of 5.2 mg/dL (H)).  No  results found for: AMMONIA    No results found for: HGBA1C, POCGLU  Lab Results   Component Value Date    HGBA1C 7.20 (H) 10/21/2021     Lab Results   Component Value Date    TSH 1.320 07/19/2023    FREET4 1.82 (H) 07/19/2023       No results found for: BLOODCX  No results found for: URINECX  No results found for: WOUNDCX  No results found for: STOOLCX  No results found for: RESPCX  No results found for: MRSACX  Pain Management Panel  More data exists         Latest Ref Rng & Units 5/17/2023 10/21/2021   Pain Management Panel   Creatinine, Urine mg/dL 369.6  -   Amphetamine, Urine Qual Negative - Negative    Barbiturates Screen, Urine Negative - Negative    Benzodiazepine Screen, Urine Negative - Negative    Buprenorphine, Screen, Urine Negative - Negative    Cocaine Screen, Urine Negative - Negative    Methadone Screen , Urine Negative - Negative    Methamphetamine, Ur Negative - Negative      I have personally reviewed the above laboratory results.   ---------------------------------------------------------------------------------------------------------------------  Imaging Results (Last 7 Days)       Procedure Component Value Units Date/Time    CT Abdomen Pelvis Without Contrast [[id number]] Collected: 07/19/23 2155     Updated: 07/19/23 2159    Narrative:            Procedure: CT abdomen pelvis without intravenous contrast.  Sagittal and  coronal reformations are supplied.     Comparison: CT abdomen pelvis 5/17/2023.     Findings: Lung bases are normal.     Evaluation of solid organs is limited without the use of intravenous  contrast.  Allowing for this, fatty infiltration of the liver noted.   Surgical absence of the gallbladder present.  Noncontrast enhanced  pancreas, spleen, adrenals and kidneys are morphologically unremarkable.   No obstructing uropathy.  Appendix is normal in the right lower  quadrant.  CT appearance of the uterus and adnexa within normal limits.   Urinary bladder partly distended and  morphologically unremarkable.  Mild  diverticulosis of the sigmoid colon.  No inflammatory change.  Aorta and  IVC have a normal appearance.     No ascites, adenopathy or free fluid in the pelvis.     In bone windows, moderate to advanced facet hypertrophic changes at  L3-L4 through L5-S1.       Impression:         No CT evidence of an acute abdominal or pelvic abnormality.     Interval resolution of the right adnexal cyst.     This report was finalized on 7/19/2023 9:57 PM by Pilar Rojas MD.             I have personally reviewed the above radiology results.     Last Echocardiogram:  Results for orders placed during the hospital encounter of 10/20/21    Adult Transthoracic Echo Complete w/ Color, Spectral and Contrast if necessary per protocol    Interpretation Summary  · Normal left ventricular cavity size and wall thickness noted. All left ventricular wall segments contract normally  · Left ventricular ejection fraction appears to be 61 - 65%.  · The aortic valve is structurally normal with no regurgitation or stenosis present.  · The mitral valve is structurally normal with no significant stenosis present. Trace mitral valve regurgitation is present.  · There is no evidence of pericardial effusion.    ---------------------------------------------------------------------------------------------------------------------    Assessment & Plan      Active Hospital Problems    Diagnosis  POA    **JESÚS (acute kidney injury) [N17.9]  Yes      Acute Kidney Injury, POA  Hypokalemia, POA   Possible UTI, POA   SIRS vs Sepsis, POA  Baseline Cr ~1, was up to 5.2 on admission  Patient with multiple JESÚS's in the recent past, associated with volume depletion at those times. Patient is on numerous mediations that are potentially nephrotoxic, including metformin and lisinopril, hold these on admission   CT abdomen/pelvis negative   Continue mIVFs, Trend Cr and urine output, avoid nephrotoxins, NSAIDs, dehydration and contrast  as able.  Repeat BMP in the a.m.   CK mildly elevated, lactate 1.0   UA concerning for UTI, also significant squamous cells, sop possibly contamination, will treat for infection pending cultures in light of significant JESÚS and SIRS vs Sepsis criteria met on arrival with HR >90, WBC >12, CRP elevation  Potassium replacement ordered in ED, continue to monitor with BMP, replace as needed  GI panel ordered due to persistent diarrhea     CHRONIC MEDICAL PROBLEMS    Type II DM  Hemoglobin A1C ordered to evaluate glycemic control.   Hold any oral hypoglycemics to prevent hypoglycemia. Will review home diabetes medications once available per pharmacy.   Accuchecks QAC and QHS.  Can start SSI if indicated   Essential hypertension  BP appears well controlled   Plan to resume home antihypertensive regimen once reconciled per pharmacy with appropriate holding parameters to prevent hypotension and/or bradycardia   Closely monitor BP per hospital protocol, titrate medications as necessary    Hyperlipidemia   Hold statin until renal function improves  Hypothyroidism  Anxiety/depression  GERD  Restart home medications pending med rec    F/E/N: IV NS at 100 mL/h.  Continue to monitor electrolytes and replace as indicated.  Clear liquid diet, advance as tolerated.     ---------------------------------------------------  DVT Prophylaxis: Subcu heparin  GI Prophylaxis: Bowel regimen  Activity: Up with assistance    The patient is considered to be a high risk patient due to: JESÚS    INPATIENT status due to the need for care which can only be reasonably provided in an hospital setting such as aggressive/expedited ancillary services and/or consultation services, the necessity for IV medications, close physician monitoring and/or the possible need for procedures.  In such, I feel patient’s risk for adverse outcomes and need for care warrant INPATIENT evaluation and predict the patient’s care encounter to likely last beyond 2  midnights.      Code Status: Full Code    Disposition/Discharge planning: Pending clinical course     I have discussed the patient's assessment and plan with Dr. Dougherty.      Erin Cabrera PA-C  Hospitalist Service -- [facility]       07/20/23  01:42 EDT    Attending Physician: Jarvis Dougherty MD

## 2023-07-20 NOTE — PLAN OF CARE
Goal Outcome Evaluation:  Plan of Care Reviewed With: patient        Progress: no change  Outcome Evaluation: Pt arrived from ER during the shift. Pt is A/O and independent. VSS on RA. no pt complaints, will continue plan of care.

## 2023-07-20 NOTE — PLAN OF CARE
Goal Outcome Evaluation:  Plan of Care Reviewed With: patient        Progress: no change  Outcome Evaluation: Patient resting in bed at this time. Patient has had no complaints this shift. VSS. No acute distress noted. Will continue with POC.

## 2023-07-20 NOTE — PAYOR COMM NOTE
"CONTACT: YAMINI HOLLINS RN  UTILIZATION MANAGEMENT DEPT.  [facility]  [address] [place] [address]  PHONE: [phone]  FAX: [phone]      INPATIENT AUTH REQUEST    Jose Davis (48 y.o. Female)       Date of Birth   1975    Social Security Number       Address   [address] [place] [id number]    Home Phone   [phone]    MRN   [id number]       Church   [employer]    Marital Status                               Admission Date   7/19/23    Admission Type   Emergency    Admitting Provider   Jarvis Dougherty MD    Attending Provider   Chad Mitchell MD    Department, Room/Bed   [facility] [address], [facility]       Discharge Date       Discharge Disposition       Discharge Destination                                 Attending Provider: Chad Mitchell MD    Allergies: Codeine, Prozac [Fluoxetine Hcl]    Isolation: None   Infection: Salmonella  (07/20/23)   Code Status: CPR    Ht: 162.6 cm (64.02\")   Wt: 121 kg (267 lb)    Admission Cmt: None   Principal Problem: JESÚS (acute kidney injury) [N17.9]                   Active Insurance as of 7/19/2023       Primary Coverage       Payor Plan Insurance Group Employer/Plan Group    [facility] COMMUNITY PLAN [facility] COMMUNITY PLAN [facility]       Payor Plan Address Payor Plan Phone Number Payor Plan Fax Number Effective Dates    [address]   7/1/2021 - None Entered    [facility] [id number]         Subscriber Name Subscriber Birth Date Member ID       JOSE DAVIS 1975 [id number]                     Emergency Contacts        (Rel.) Home Phone Work Phone Mobile Phone    MICHELLE ALLEN (Son) [phone] -- --                 History & Physical        Erin Cabrera PA-C at 07/19/23 8485       Attestation signed by Jarvis Dougherty MD at 07/20/23 2878    I have discussed this patient with Erni Cabrera PA-C, and have reviewed this documentation and agree. GI panel positive " for salmonella; will avoid fluoroquinolones in light of JESÚS and start azithromycin instead. Consult ID for further assistance. Repeat labs this morning show only minor improvement in renal function after IV fluid hydration. BUN not significantly elevated so may have already progressed to ATN. Will consult nephrology for further assistance.                        [facility] Medicine Services  HISTORY & PHYSICAL    Patient Identification:  Name:  Arabella Rose  Age:  48 y.o.  Sex:  female  :  1975  MRN:  [id number]   Visit Number:  [id number]  Admit Date: 2023   Primary Care Physician:  Benigno Verduzco PA-C     Subjective     Chief complaint:   Chief Complaint   Patient presents with    Urinary Retention     History of presenting illness:   Patient is a 48 y.o. female with past medical history significant for type II non-insulin-dependent diabetes mellitus, history of PE, hypertension, anxiety/depression, hypothyroidism, GERD and hyperlipidemia that presented to the [facility] emergency department for evaluation of elevated creatinine.  Patient was seen at an outside facility 6 days ago for an JESÚS and received a 2 L fluid bolus at that time.  On presentation at that facility, her creatinine was initially 1.6, improved to 1.37 after fluids. Patient followed up with her PCP, who sent her to the ED due to her creatinine being elevated.  Arrival to ED creatinine was 5.20. Patient endorses mild abdominal cramping but no other symptoms.  Denies fever, flank pain, dysuria, nausea, vomiting.  Patient was admitted to our service on 2023 for JESÚS secondary to gastroenteritis.    Patient examined at bedside. Patient states when she was visiting family around the  in [place], they cooked clams and she began having nausea, vomiting, and diarrhea shortly after. This led her to going to the ED 6 days ago in [place]. She states since then she has not had any  nausea or vomiting, but she is still having abdominal cramping and watery diarrhea, which she states have been improving. Over the past few days she has had approximately 3 episodes of diarrhea a day. She states she has been drinking a lot of fluids including water and Pedialyte. She states she has noticed a decrease in urine output, only urinating 2-3 times today, each being a very small amount.     Upon arrival to the ED, vitals were temperature 97, , RR 18, /77, SPO2 96% on room air.  Labs significant for, potassium 3.3, anion gap 16.7, BUN 21, creatinine 5.2, GFR 9.5, glucose 130, WBC 12.93, platelets 496, CRP 1.48.  UA dark yellow, cloudy, 1+ ketones, trace leukocytes, 2+ protein, 1+ bilirubin, WBCs 6-12, bacteria 2+, squamous epithelial cells 7-12.     CT abdomen/pelvis shows no acute abnormality.    Patient has been admitted to the Medr unit.  ---------------------------------------------------------------------------------------------------------------------   Review of Systems   Constitutional:  Negative for chills, diaphoresis, fatigue and fever.   Respiratory:  Negative for cough, shortness of breath and wheezing.    Cardiovascular:  Negative for chest pain, palpitations and leg swelling.   Gastrointestinal:  Positive for abdominal pain and diarrhea. Negative for constipation, nausea and vomiting.   Genitourinary:  Positive for decreased urine volume. Negative for difficulty urinating, dysuria and flank pain.   Musculoskeletal:  Negative for arthralgias, myalgias and neck stiffness.   Skin:  Negative for rash and wound.   Neurological:  Negative for dizziness, weakness and light-headedness.   Psychiatric/Behavioral:  Negative for agitation and confusion.     ---------------------------------------------------------------------------------------------------------------------   Past Medical History:   Diagnosis Date    Anxiety     Depression     Diabetes mellitus     High cholesterol      History of pulmonary embolus (PE)     Hypertension     Hypothyroidism     Sleep apnea      Past Surgical History:   Procedure Laterality Date    CHOLECYSTECTOMY      EYE SURGERY      PULMONARY EMBOLISM SURGERY       Family History   Problem Relation Age of Onset    Stroke Mother     Diabetes Father     Heart disease Father     Hypertension Father      Social History     Socioeconomic History    Marital status:    Tobacco Use    Smoking status: Never    Smokeless tobacco: Never   Vaping Use    Vaping Use: Never used   Substance and Sexual Activity    Alcohol use: No    Drug use: No    Sexual activity: Defer     ---------------------------------------------------------------------------------------------------------------------   Allergies:  Codeine and Prozac [fluoxetine hcl]  ---------------------------------------------------------------------------------------------------------------------   Medications below are reported home medications pulling from within the system; at this time, these medications have not been reconciled unless otherwise specified and are in the verification process for further verifcation as current home medications.    Prior to Admission Medications       Prescriptions Last Dose Informant Patient Reported? Taking?    atorvastatin (LIPITOR) 40 MG tablet  Pharmacy Yes No    Take 1 tablet by mouth Daily.    busPIRone (BUSPAR) 15 MG tablet  Pharmacy Yes No    Take 1 tablet by mouth 2 (Two) Times a Day As Needed (anxiety).    levothyroxine (SYNTHROID, LEVOTHROID) 75 MCG tablet  Pharmacy Yes No    Take 1 tablet by mouth Daily.    lisinopril (PRINIVIL,ZESTRIL) 30 MG tablet  Pharmacy Yes No    Take 1 tablet by mouth Daily.    metFORMIN (GLUCOPHAGE) 500 MG tablet  Pharmacy Yes No    Take 1 tablet by mouth Every 12 (Twelve) Hours.    metoprolol succinate XL (TOPROL-XL) 50 MG 24 hr tablet  Pharmacy Yes No    Take 1 tablet by mouth Daily.    omeprazole (priLOSEC) 20 MG capsule  Pharmacy Yes No     Take 1 capsule by mouth Daily.    pramipexole (MIRAPEX) 0.25 MG tablet  Pharmacy Yes No    Take 1 tablet by mouth Every Night.    promethazine (PHENERGAN) 12.5 MG tablet   No No    Take 1 tablet by mouth Every 6 (Six) Hours As Needed for Nausea or Vomiting.          ---------------------------------------------------------------------------------------------------------------------    Objective     Hospital Scheduled Meds:  cefTRIAXone, 1,000 mg, Intravenous, Q24H  heparin (porcine), 5,000 Units, Subcutaneous, Q12H  senna-docusate sodium, 2 tablet, Oral, BID  sodium chloride, 10 mL, Intravenous, Q12H      sodium chloride, 100 mL/hr, Last Rate: 100 mL/hr (07/20/23 0112)        Current listed hospital scheduled medications may not yet reflect those currently placed in orders that are signed and held, awaiting patient's arrival to floor/unit.    ---------------------------------------------------------------------------------------------------------------------   Vital Signs:  Temp:  [97 °F (36.1 °C)-97.7 °F (36.5 °C)] 97.7 °F (36.5 °C)  Heart Rate:  [] 91  Resp:  [18-20] 20  BP: (110-145)/(75-77) 123/77  No data found.  SpO2 Percentage    07/19/23 1702 07/19/23 2309 07/20/23 0032   SpO2: 96% 92% 96%     SpO2:  [92 %-96 %] 96 %  on   ;   Device (Oxygen Therapy): room air    Body mass index is 45.81 kg/m².  Wt Readings from Last 3 Encounters:   07/20/23 121 kg (267 lb)   05/19/23 125 kg (275 lb 14.4 oz)   02/12/23 124 kg (273 lb)     ---------------------------------------------------------------------------------------------------------------------   Physical Exam:\  Physical Exam  Constitutional:       General: She is not in acute distress.     Appearance: Normal appearance.   HENT:      Head: Normocephalic and atraumatic.      Right Ear: External ear normal.      Left Ear: External ear normal.      Nose: No nasal deformity.      Mouth/Throat:      Lips: Pink.      Mouth: Mucous membranes are moist.   Eyes:       Conjunctiva/sclera: Conjunctivae normal.      Pupils: Pupils are equal, round, and reactive to light.   Cardiovascular:      Rate and Rhythm: Normal rate and regular rhythm.      Pulses:           Dorsalis pedis pulses are 2+ on the right side and 2+ on the left side.      Heart sounds: Normal heart sounds.   Pulmonary:      Effort: Pulmonary effort is normal.      Breath sounds: Normal breath sounds. No wheezing, rhonchi or rales.   Abdominal:      General: Abdomen is flat. Bowel sounds are normal.      Palpations: Abdomen is soft.      Tenderness: There is no guarding or rebound.   Genitourinary:     Comments: No moreland catheter in place   Musculoskeletal:      Cervical back: Neck supple. Normal range of motion.      Right lower leg: No edema.      Left lower leg: No edema.   Skin:     General: Skin is warm and dry.   Neurological:      General: No focal deficit present.      Mental Status: She is alert and oriented to person, place, and time.   Psychiatric:         Mood and Affect: Mood normal.         Behavior: Behavior normal.     ---------------------------------------------------------------------------------------------------------------------    ---------------------------------------------------------------------------------------------------------------------   Results from last 7 days   Lab Units 07/19/23 2025 07/19/23  1803   CK TOTAL U/L 193*  --    HSTROP T ng/L 27* 29*     Results from last 7 days   Lab Units 07/19/23  1803   PROBNP pg/mL 83.4         Results from last 7 days   Lab Units 07/19/23  2338 07/19/23  1803   CRP mg/dL  --  1.48*   LACTATE mmol/L 1.0  --    WBC 10*3/mm3  --  12.93*   HEMOGLOBIN g/dL  --  13.1   HEMATOCRIT %  --  39.3   MCV fL  --  83.4   MCHC g/dL  --  33.3   PLATELETS 10*3/mm3  --  496*     Results from last 7 days   Lab Units 07/19/23  1803   SODIUM mmol/L 136   POTASSIUM mmol/L 3.3*   MAGNESIUM mg/dL 1.9   CHLORIDE mmol/L 97*   CO2 mmol/L 22.3   BUN mg/dL 21*    CREATININE mg/dL 5.20*   CALCIUM mg/dL 9.9   GLUCOSE mg/dL 130*   ALBUMIN g/dL 4.1   BILIRUBIN mg/dL 0.6   ALK PHOS U/L 79   AST (SGOT) U/L 24   ALT (SGPT) U/L 35*   Estimated Creatinine Clearance: 16.6 mL/min (A) (by C-G formula based on SCr of 5.2 mg/dL (H)).  No results found for: AMMONIA    No results found for: HGBA1C, POCGLU  Lab Results   Component Value Date    HGBA1C 7.20 (H) 10/21/2021     Lab Results   Component Value Date    TSH 1.320 07/19/2023    FREET4 1.82 (H) 07/19/2023       No results found for: BLOODCX  No results found for: URINECX  No results found for: WOUNDCX  No results found for: STOOLCX  No results found for: RESPCX  No results found for: MRSACX  Pain Management Panel  More data exists         Latest Ref Rng & Units 5/17/2023 10/21/2021   Pain Management Panel   Creatinine, Urine mg/dL 369.6  -   Amphetamine, Urine Qual Negative - Negative    Barbiturates Screen, Urine Negative - Negative    Benzodiazepine Screen, Urine Negative - Negative    Buprenorphine, Screen, Urine Negative - Negative    Cocaine Screen, Urine Negative - Negative    Methadone Screen , Urine Negative - Negative    Methamphetamine, Ur Negative - Negative      I have personally reviewed the above laboratory results.   ---------------------------------------------------------------------------------------------------------------------  Imaging Results (Last 7 Days)       Procedure Component Value Units Date/Time    CT Abdomen Pelvis Without Contrast [[id number]] Collected: 07/19/23 2155     Updated: 07/19/23 2159    Narrative:            Procedure: CT abdomen pelvis without intravenous contrast.  Sagittal and  coronal reformations are supplied.     Comparison: CT abdomen pelvis 5/17/2023.     Findings: Lung bases are normal.     Evaluation of solid organs is limited without the use of intravenous  contrast.  Allowing for this, fatty infiltration of the liver noted.   Surgical absence of the gallbladder present.   Noncontrast enhanced  pancreas, spleen, adrenals and kidneys are morphologically unremarkable.   No obstructing uropathy.  Appendix is normal in the right lower  quadrant.  CT appearance of the uterus and adnexa within normal limits.   Urinary bladder partly distended and morphologically unremarkable.  Mild  diverticulosis of the sigmoid colon.  No inflammatory change.  Aorta and  IVC have a normal appearance.     No ascites, adenopathy or free fluid in the pelvis.     In bone windows, moderate to advanced facet hypertrophic changes at  L3-L4 through L5-S1.       Impression:         No CT evidence of an acute abdominal or pelvic abnormality.     Interval resolution of the right adnexal cyst.     This report was finalized on 7/19/2023 9:57 PM by Pilar Rojas MD.             I have personally reviewed the above radiology results.     Last Echocardiogram:  Results for orders placed during the hospital encounter of 10/20/21    Adult Transthoracic Echo Complete w/ Color, Spectral and Contrast if necessary per protocol    Interpretation Summary  · Normal left ventricular cavity size and wall thickness noted. All left ventricular wall segments contract normally  · Left ventricular ejection fraction appears to be 61 - 65%.  · The aortic valve is structurally normal with no regurgitation or stenosis present.  · The mitral valve is structurally normal with no significant stenosis present. Trace mitral valve regurgitation is present.  · There is no evidence of pericardial effusion.    ---------------------------------------------------------------------------------------------------------------------    Assessment & Plan      Active Hospital Problems    Diagnosis  POA    **JESÚS (acute kidney injury) [N17.9]  Yes      Acute Kidney Injury, POA  Hypokalemia, POA   Possible UTI, POA   SIRS vs Sepsis, POA  Baseline Cr ~1, was up to 5.2 on admission  Patient with multiple JESÚS's in the recent past, associated with volume depletion  at those times. Patient is on numerous mediations that are potentially nephrotoxic, including metformin and lisinopril, hold these on admission   CT abdomen/pelvis negative   Continue mIVFs, Trend Cr and urine output, avoid nephrotoxins, NSAIDs, dehydration and contrast as able.  Repeat BMP in the a.m.   CK mildly elevated, lactate 1.0   UA concerning for UTI, also significant squamous cells, sop possibly contamination, will treat for infection pending cultures in light of significant JESÚS and SIRS vs Sepsis criteria met on arrival with HR >90, WBC >12, CRP elevation  Potassium replacement ordered in ED, continue to monitor with BMP, replace as needed  GI panel ordered due to persistent diarrhea     CHRONIC MEDICAL PROBLEMS    Type II DM  Hemoglobin A1C ordered to evaluate glycemic control.   Hold any oral hypoglycemics to prevent hypoglycemia. Will review home diabetes medications once available per pharmacy.   Accuchecks QAC and QHS.  Can start SSI if indicated   Essential hypertension  BP appears well controlled   Plan to resume home antihypertensive regimen once reconciled per pharmacy with appropriate holding parameters to prevent hypotension and/or bradycardia   Closely monitor BP per hospital protocol, titrate medications as necessary    Hyperlipidemia   Hold statin until renal function improves  Hypothyroidism  Anxiety/depression  GERD  Restart home medications pending med rec    F/E/N: IV NS at 100 mL/h.  Continue to monitor electrolytes and replace as indicated.  Clear liquid diet, advance as tolerated.     ---------------------------------------------------  DVT Prophylaxis: Subcu heparin  GI Prophylaxis: Bowel regimen  Activity: Up with assistance    The patient is considered to be a high risk patient due to: JESÚS    INPATIENT status due to the need for care which can only be reasonably provided in an hospital setting such as aggressive/expedited ancillary services and/or consultation services, the  necessity for IV medications, close physician monitoring and/or the possible need for procedures.  In such, I feel patient’s risk for adverse outcomes and need for care warrant INPATIENT evaluation and predict the patient’s care encounter to likely last beyond 2 midnights.      Code Status: Full Code    Disposition/Discharge planning: Pending clinical course     I have discussed the patient's assessment and plan with Dr. Dougherty.      Erin Cabrera PA-C  Hospitalist Service -- [facility]       07/20/23  01:42 EDT    Attending Physician: Jarvis Dougherty MD        Electronically signed by Jarvis Dougherty MD at 07/20/23 0526          Emergency Department Notes        Rosanna Zapata, RN at 07/20/23 0027          Transported by ed tech via wheelchair to . Iv dated, and initialed. Wrist band on right wrist.     Electronically signed by Rosanna Zapata RN at 07/20/23 0027       Rosanna Zapata RN at 07/20/23 0004          Report given to trent slater.     Electronically signed by Rosanna Zapata RN at 07/20/23 0005       Symes, Heather at 07/19/23 2352          EKG completed by me @2352, and was given to Dr. Reddy.     Electronically signed by Symes, Heather at 07/20/23 0016       Symes, Heather at 07/19/23 2103          Went to collect covid swab. Patient is not in the bed at this time.     Electronically signed by Symes, Heather at 07/19/23 2103       Ambrose Reddy MD at 07/19/23 2016            [facility]  emergency department encounter        Pt Name: Arabella Rose  MRN: [id number]  Birthdate 1975  Date of evaluation: 7/20/2023    Chief Complaint   Patient presents with    Urinary Retention             HISTORY OF PRESENT ILLNESS:   Arabella Rose is a 48 y.o. female PMH HTN, HLD, DM, hypothyroid, sleep apnea, PE.    Patient presents for elevated creatinine.  Patient had recent JESÚS, seen at outside facility 6 days ago and received 2 L fluid bolus.  Follow-up  with primary care provider sent her here due to her elevated creatinine.  Patient endorses mild nonfocal abdominal cramping but no other significant symptoms.  Denies fever flank pain dysuria nausea vomiting.              PCP: Benigno Verduzco PA-C          REVIEW OF SYSTEMS:     Review of Systems   Constitutional:  Negative for fever.   HENT:  Negative for congestion and rhinorrhea.    Eyes:  Negative for visual disturbance.   Respiratory:  Negative for cough and shortness of breath.    Cardiovascular:  Negative for chest pain.   Gastrointestinal:  Negative for abdominal pain, nausea and vomiting.        Abdominal cramping   Genitourinary:  Negative for dysuria.   Musculoskeletal:  Negative for myalgias.   Skin:  Negative for rash.   Neurological:  Negative for headaches.   Psychiatric/Behavioral:  Negative for confusion.      Review of systems otherwise as per HPI.          PREVIOUS HISTORY:         Past Medical History:   Diagnosis Date    Anxiety     Depression     Diabetes mellitus     High cholesterol     History of pulmonary embolus (PE)     Hypertension     Hypothyroidism     Sleep apnea            Past Surgical History:   Procedure Laterality Date    CHOLECYSTECTOMY      EYE SURGERY      PULMONARY EMBOLISM SURGERY             Social History     Socioeconomic History    Marital status:    Tobacco Use    Smoking status: Never    Smokeless tobacco: Never   Vaping Use    Vaping Use: Never used   Substance and Sexual Activity    Alcohol use: No    Drug use: No    Sexual activity: Defer           Family History   Problem Relation Age of Onset    Stroke Mother     Diabetes Father     Heart disease Father     Hypertension Father          Current Outpatient Medications   Medication Instructions    atorvastatin (LIPITOR) 40 mg, Oral, Daily    busPIRone (BUSPAR) 15 mg, Oral, 3 Times Daily PRN    doxepin (SINEQUAN) 10 mg, Oral, Nightly    levothyroxine (SYNTHROID, LEVOTHROID) 75 mcg, Oral, Daily     "lisinopril (PRINIVIL,ZESTRIL) 40 mg, Oral, Daily    metFORMIN (GLUCOPHAGE) 500 mg, Oral, Every 12 Hours    metoprolol succinate XL (TOPROL-XL) 50 mg, Oral, Daily    omeprazole (PRILOSEC) 20 mg, Oral, Daily    pramipexole (MIRAPEX) 0.25 mg, Oral, Nightly         Allergies:  Codeine and Prozac [fluoxetine hcl]          PHYSICAL EXAM:     Patient Vitals for the past 24 hrs:   BP Temp Temp src Pulse Resp SpO2 Height Weight   07/20/23 0032 123/77 97.7 °F (36.5 °C) Oral 91 20 96 % -- 121 kg (267 lb)   07/19/23 2309 110/75 97 °F (36.1 °C) -- 97 20 92 % 162.6 cm (64.02\") 125 kg (275 lb)   07/19/23 1704 145/77 -- -- -- -- -- -- --   07/19/23 1702 -- 97 °F (36.1 °C) Infrared 105 18 96 % 162.6 cm (64\") --       Physical Exam  Vitals and nursing note reviewed.   Constitutional:       General: She is not in acute distress.  HENT:      Head: Normocephalic and atraumatic.   Eyes:      Extraocular Movements: Extraocular movements intact.      Conjunctiva/sclera: Conjunctivae normal.   Pulmonary:      Effort: Pulmonary effort is normal. No respiratory distress.   Abdominal:      General: Abdomen is flat. There is no distension.      Palpations: Abdomen is soft.      Tenderness: There is no abdominal tenderness. There is no guarding or rebound.      Comments: Bedside physician ultrasound performed, no urinary retention   Musculoskeletal:         General: No deformity. Normal range of motion.      Cervical back: Normal range of motion. No rigidity.   Skin:     Coloration: Skin is not jaundiced.      Findings: No rash.   Neurological:      General: No focal deficit present.      Mental Status: She is alert and oriented to person, place, and time.   Psychiatric:         Mood and Affect: Mood normal.         Behavior: Behavior normal.           COMPLETED DIAGNOSTIC STUDIES AND INTERVENTIONS:     Lab Results (last 24 hours)       Procedure Component Value Units Date/Time    CBC & Differential [[id number]]  (Abnormal) Collected: 07/19/23 " 1803    Specimen: Blood Updated: 07/19/23 1810    Narrative:      The following orders were created for panel order CBC & Differential.  Procedure                               Abnormality         Status                     ---------                               -----------         ------                     CBC Auto Differential[[id number]]        Abnormal            Final result                 Please view results for these tests on the individual orders.    Comprehensive Metabolic Panel [[id number]]  (Abnormal) Collected: 07/19/23 1803    Specimen: Blood Updated: 07/19/23 1842     Glucose 130 mg/dL      BUN 21 mg/dL      Creatinine 5.20 mg/dL      Sodium 136 mmol/L      Potassium 3.3 mmol/L      Chloride 97 mmol/L      CO2 22.3 mmol/L      Calcium 9.9 mg/dL      Total Protein 7.7 g/dL      Albumin 4.1 g/dL      ALT (SGPT) 35 U/L      AST (SGOT) 24 U/L      Alkaline Phosphatase 79 U/L      Total Bilirubin 0.6 mg/dL      Globulin 3.6 gm/dL      A/G Ratio 1.1 g/dL      BUN/Creatinine Ratio 4.0     Anion Gap 16.7 mmol/L      eGFR 9.5 mL/min/1.73      Comment: <15 Indicative of kidney failure       Narrative:      GFR Normal >60  Chronic Kidney Disease <60  Kidney Failure <15      Urinalysis With Culture If Indicated - Urine, Clean Catch [[id number]]  (Abnormal) Collected: 07/19/23 1803    Specimen: Urine, Clean Catch Updated: 07/19/23 1814     Color, UA Dark Yellow     Appearance, UA Cloudy     pH, UA <=5.0     Specific Gravity, UA 1.018     Glucose, UA Negative     Ketones, UA 15 mg/dL (1+)     Bilirubin, UA Small (1+)     Blood, UA Negative     Protein,  mg/dL (2+)     Leuk Esterase, UA Trace     Nitrite, UA Negative     Urobilinogen, UA 0.2 E.U./dL    Narrative:      In absence of clinical symptoms, the presence of pyuria, bacteria, and/or nitrites on the urinalysis result does not correlate with infection.    BNP [[id number]]  (Normal) Collected: 07/19/23 1803    Specimen: Blood Updated: 07/19/23 1843      proBNP 83.4 pg/mL     Narrative:      Among patients with dyspnea, NT-proBNP is highly sensitive for the detection of acute congestive heart failure. In addition NT-proBNP of <300 pg/ml effectively rules out acute congestive heart failure with 99% negative predictive value.    Results may be falsely decreased if patient taking Biotin.      High Sensitivity Troponin T [[id number]]  (Abnormal) Collected: 07/19/23 1803    Specimen: Blood Updated: 07/19/23 1843     HS Troponin T 29 ng/L     Narrative:      High Sensitive Troponin T Reference Range:  <10.0 ng/L- Negative Female for AMI  <15.0 ng/L- Negative Male for AMI  >=10 - Abnormal Female indicating possible myocardial injury.  >=15 - Abnormal Male indicating possible myocardial injury.   Clinicians would have to utilize clinical acumen, EKG, Troponin, and serial changes to determine if it is an Acute Myocardial Infarction or myocardial injury due to an underlying chronic condition.         Sedimentation Rate [[id number]]  (Normal) Collected: 07/19/23 1803    Specimen: Blood Updated: 07/19/23 1813     Sed Rate 17 mm/hr     C-reactive Protein [[id number]]  (Abnormal) Collected: 07/19/23 1803    Specimen: Blood Updated: 07/19/23 1842     C-Reactive Protein 1.48 mg/dL     T4, Free [[id number]]  (Abnormal) Collected: 07/19/23 1803    Specimen: Blood Updated: 07/19/23 1843     Free T4 1.82 ng/dL     Narrative:      Results may be falsely increased if patient taking Biotin.      TSH [[id number]]  (Normal) Collected: 07/19/23 1803    Specimen: Blood Updated: 07/19/23 1843     TSH 1.320 uIU/mL     Magnesium [[id number]]  (Normal) Collected: 07/19/23 1803    Specimen: Blood Updated: 07/19/23 1842     Magnesium 1.9 mg/dL     CBC Auto Differential [[id number]]  (Abnormal) Collected: 07/19/23 1803    Specimen: Blood Updated: 07/19/23 1810     WBC 12.93 10*3/mm3      RBC 4.71 10*6/mm3      Hemoglobin 13.1 g/dL      Hematocrit 39.3 %      MCV 83.4 fL      MCH 27.8 pg      MCHC 33.3 g/dL       RDW 13.2 %      RDW-SD 39.9 fl      MPV 8.7 fL      Platelets 496 10*3/mm3      Neutrophil % 59.1 %      Lymphocyte % 28.4 %      Monocyte % 8.7 %      Eosinophil % 1.2 %      Basophil % 0.6 %      Immature Grans % 2.0 %      Neutrophils, Absolute 7.64 10*3/mm3      Lymphocytes, Absolute 3.67 10*3/mm3      Monocytes, Absolute 1.13 10*3/mm3      Eosinophils, Absolute 0.15 10*3/mm3      Basophils, Absolute 0.08 10*3/mm3      Immature Grans, Absolute 0.26 10*3/mm3      nRBC 0.0 /100 WBC     Urinalysis, Microscopic Only - Urine, Clean Catch [[id number]]  (Abnormal) Collected: 07/19/23 1803    Specimen: Urine, Clean Catch Updated: 07/19/23 1827     RBC, UA 0-2 /HPF      WBC, UA 6-12 /HPF      Bacteria, UA 2+ /HPF      Squamous Epithelial Cells, UA 7-12 /HPF      Hyaline Casts, UA 7-12 /LPF      Methodology Manual Light Microscopy    Urine Culture - Urine, Urine, Clean Catch [[id number]] Collected: 07/19/23 1803    Specimen: Urine, Clean Catch Updated: 07/19/23 1827    High Sensitivity Troponin T 2Hr [[id number]]  (Abnormal) Collected: 07/19/23 2025    Specimen: Blood Updated: 07/19/23 2053     HS Troponin T 27 ng/L      Troponin T Delta -2 ng/L     Narrative:      High Sensitive Troponin T Reference Range:  <10.0 ng/L- Negative Female for AMI  <15.0 ng/L- Negative Male for AMI  >=10 - Abnormal Female indicating possible myocardial injury.  >=15 - Abnormal Male indicating possible myocardial injury.   Clinicians would have to utilize clinical acumen, EKG, Troponin, and serial changes to determine if it is an Acute Myocardial Infarction or myocardial injury due to an underlying chronic condition.         CK [[id number]]  (Abnormal) Collected: 07/19/23 2025    Specimen: Blood Updated: 07/19/23 2326     Creatine Kinase 193 U/L     COVID PRE-OP / PRE-PROCEDURE SCREENING ORDER (NO ISOLATION) - Swab, Nasopharynx [[id number]]  (Normal) Collected: 07/19/23 2139    Specimen: Swab from Nasopharynx Updated: 07/19/23 2202     Narrative:      The following orders were created for panel order COVID PRE-OP / PRE-PROCEDURE SCREENING ORDER (NO ISOLATION) - Swab, Nasopharynx.  Procedure                               Abnormality         Status                     ---------                               -----------         ------                     COVID-19 and FLU A/B PCR...[[id number]]  Normal              Final result                 Please view results for these tests on the individual orders.    COVID-19 and FLU A/B PCR - Swab, Nasopharynx [[id number]]  (Normal) Collected: 07/19/23 2139    Specimen: Swab from Nasopharynx Updated: 07/19/23 2202     COVID19 Not Detected     Influenza A PCR Not Detected     Influenza B PCR Not Detected    Narrative:      Fact sheet for providers: https://www.fda.gov/media/317275/download    Fact sheet for patients: https://www.fda.gov/media/697759/download    Test performed by PCR.    Lactic Acid, Plasma [[id number]]  (Normal) Collected: 07/19/23 2338    Specimen: Blood from Arm, Right Updated: 07/20/23 0006     Lactate 1.0 mmol/L     Blood Culture - Blood, Arm, Right [[id number]] Collected: 07/19/23 2338    Specimen: Blood from Arm, Right Updated: 07/19/23 2347    Blood Culture - Blood, Arm, Left [[id number]] Collected: 07/19/23 2344    Specimen: Blood from Arm, Left Updated: 07/19/23 2346    Hemoglobin A1c [[id number]]  (Abnormal) Collected: 07/20/23 0144    Specimen: Blood Updated: 07/20/23 0218     Hemoglobin A1C 6.00 %     Narrative:      Hemoglobin A1C Ranges:    Increased Risk for Diabetes  5.7% to 6.4%  Diabetes                     >= 6.5%  Diabetic Goal                < 7.0%    CBC Auto Differential [[id number]]  (Abnormal) Collected: 07/20/23 0144    Specimen: Blood Updated: 07/20/23 0202     WBC 12.73 10*3/mm3      RBC 4.23 10*6/mm3      Hemoglobin 11.5 g/dL      Hematocrit 35.7 %      MCV 84.4 fL      MCH 27.2 pg      MCHC 32.2 g/dL      RDW 13.4 %      RDW-SD 41.3 fl      MPV 8.9 fL      Platelets 395  10*3/mm3      Neutrophil % 55.3 %      Lymphocyte % 30.8 %      Monocyte % 9.8 %      Eosinophil % 1.4 %      Basophil % 0.5 %      Immature Grans % 2.2 %      Neutrophils, Absolute 7.03 10*3/mm3      Lymphocytes, Absolute 3.92 10*3/mm3      Monocytes, Absolute 1.25 10*3/mm3      Eosinophils, Absolute 0.18 10*3/mm3      Basophils, Absolute 0.07 10*3/mm3      Immature Grans, Absolute 0.28 10*3/mm3      nRBC 0.0 /100 WBC     Comprehensive Metabolic Panel [[id number]]  (Abnormal) Collected: 07/20/23 0144    Specimen: Blood Updated: 07/20/23 0232     Glucose 124 mg/dL      BUN 21 mg/dL      Creatinine 5.15 mg/dL      Sodium 133 mmol/L      Potassium 3.1 mmol/L      Chloride 98 mmol/L      CO2 19.8 mmol/L      Calcium 9.1 mg/dL      Total Protein 6.9 g/dL      Albumin 3.5 g/dL      ALT (SGPT) 32 U/L      AST (SGOT) 21 U/L      Alkaline Phosphatase 70 U/L      Total Bilirubin 0.5 mg/dL      Globulin 3.4 gm/dL      A/G Ratio 1.0 g/dL      BUN/Creatinine Ratio 4.1     Anion Gap 15.2 mmol/L      eGFR 9.6 mL/min/1.73      Comment: <15 Indicative of kidney failure       Narrative:      GFR Normal >60  Chronic Kidney Disease <60  Kidney Failure <15      Gastrointestinal Panel, PCR - Stool, Per Rectum [[id number]]  (Abnormal) Collected: 07/20/23 0208    Specimen: Stool from Per Rectum Updated: 07/20/23 0345     Campylobacter Not Detected     Plesiomonas shigelloides Not Detected     Salmonella Detected     Vibrio Not Detected     Vibrio cholerae Not Detected     Yersinia enterocolitica Not Detected     Enteroaggregative E. coli (EAEC) Not Detected     Enteropathogenic E. coli (EPEC) Not Detected     Enterotoxigenic E. coli (ETEC) lt/st Not Detected     Shiga-like toxin-producing E. coli (STEC) stx1/stx2 Not Detected     Shigella/Enteroinvasive E. coli (EIEC) Not Detected     Cryptosporidium Not Detected     Cyclospora cayetanensis Not Detected     Entamoeba histolytica Not Detected     Giardia lamblia Not Detected     Adenovirus  F40/41 Not Detected     Astrovirus Not Detected     Norovirus GI/GII Not Detected     Rotavirus A Not Detected     Sapovirus (I, II, IV or V) Not Detected    Stool Culture, Targeted - Stool, Per Rectum [[id number]] Collected: 07/20/23 0208    Specimen: Stool from Per Rectum Updated: 07/20/23 0345              CT Abdomen Pelvis Without Contrast   Final Result       No CT evidence of an acute abdominal or pelvic abnormality.       Interval resolution of the right adnexal cyst.       This report was finalized on 7/19/2023 9:57 PM by Pilar Rojas MD.                New Medications Ordered This Visit   Medications    sodium chloride 0.9 % bolus 1,000 mL    potassium chloride (K-DUR,KLOR-CON) CR tablet 40 mEq    sodium chloride 0.9 % flush 10 mL    sodium chloride 0.9 % flush 10 mL    sodium chloride 0.9 % infusion 40 mL    AND Linked Order Group     sennosides-docusate (PERICOLACE) 8.6-50 MG per tablet 2 tablet     polyethylene glycol (MIRALAX) packet 17 g     bisacodyl (DULCOLAX) EC tablet 5 mg     bisacodyl (DULCOLAX) suppository 10 mg    heparin (porcine) 5000 UNIT/ML injection 5,000 Units    sodium chloride 0.9 % infusion    cefTRIAXone (ROCEPHIN) 1,000 mg in sodium chloride 0.9 % 100 mL IVPB-VTB    Magnesium Low Dose Replacement - Follow Nurse / BPA Driven Protocol    levothyroxine (SYNTHROID, LEVOTHROID) tablet 75 mcg    metoprolol succinate XL (TOPROL-XL) 24 hr tablet 50 mg    atorvastatin (LIPITOR) tablet 40 mg    busPIRone (BUSPAR) tablet 15 mg    doxepin (SINEquan) capsule 10 mg    pantoprazole (PROTONIX) EC tablet 40 mg    azithromycin (ZITHROMAX) tablet 1,000 mg    azithromycin (ZITHROMAX) tablet 500 mg         Procedures            MEDICAL DECISION-MAKING AND ED COURSE:     ED Course as of 07/20/23 0453   Wed Jul 19, 2023   2036 48-year-old female presents in acute renal failure, mild abdominal cramping but no other significant complaints.  Administer 1 L NS IVF [KP]   2036 Creatinine(!): 5.20  1.1 x 2  months ago [KP]   2037 Potassium(!): 3.3  Replete 40 mEq p.o. [KP]   2037 HS Troponin T(!): 29  No symptoms of ACS [KP]   2246 CT Abdomen Pelvis Without Contrast  No CT evidence of an acute abdominal or pelvic abnormality.     Interval resolution of the right adnexal cyst.   [KP]   2309 Case discussed with and patient admitted to hospitalist Dr. Camejo. [KP]   2317 WBC, UA(!): 6-12 [KP]   2317 Bacteria, UA(!): 2+ [KP]   2317 Ceftriaxone ordered by Dr. Camejo. []   Thu Jul 20, 2023   0009 EKG at 2352 hrs. NSR 93 bpm, , QRS 96, QTc 462, regular axis, poor R wave progression, no significant ST deviation, no STEMI. [KP]      ED Course User Index  [KP] Ambrose Reddy MD       ?      FINAL IMPRESSION:       1. Acute renal failure, unspecified acute renal failure type    2. Hypokalemia    3. Acute UTI         The complaints listed here are new problems to this examiner.       Medication List        ASK your doctor about these medications      lisinopril 40 MG tablet  Commonly known as: PRINIVIL,ZESTRIL  Ask about: Which instructions should I use?              FOLLOW-UP  No follow-up provider specified.    DISPOSITION  ED Disposition       ED Disposition   Decision to Admit    Condition   --    Comment   Level of Care: Med/Surg [1]   Diagnosis: JESÚS (acute kidney injury) [609087]   Admitting Physician: AGATHA CAMEJO [[id number]]   Attending Physician: AGATHA CAMEJO [[id number]]   Certification: I Certify That Inpatient Hospital Services Are Medically Necessary For Greater Than 2 Midnights                     Please note that portions of this note were completed with a voice recognition program.      Ambrose Reddy MD  04:53 EDT  7/20/2023               Ambrose Reddy MD  07/19/23 7377       Ambrose Reddy MD  07/20/23 2320      Electronically signed by Ambrose Reddy MD at 07/20/23 2916       Fernando Bellamy MD at 07/19/23 4291                   MEDICAL SCREENING:    Reason for Visit:  Urinary Symptoms    Patient initially seen in triage.  The patient was advised further evaluation and diagnostic testing will be needed, some of the treatment and testing will be initiated in the lobby in order to begin the process.  The patient will be returned to the waiting area for the time being and possibly be re-assessed by a subsequent ED provider.  The patient will be brought back to the treatment area in as timely manner as possible.      Fernando Bellamy MD  07/19/23 1732      Electronically signed by Fernando Bellamy MD at 07/19/23 1732       Facility-Administered Medications as of 7/20/2023   Medication Dose Route Frequency Provider Last Rate Last Admin    atorvastatin (LIPITOR) tablet 40 mg  40 mg Oral Daily Erin Cabrera PA-C        [COMPLETED] azithromycin (ZITHROMAX) tablet 1,000 mg  1,000 mg Oral Q24H Erin Cabrera PA-C   1,000 mg at 07/20/23 0512    [START ON 7/21/2023] azithromycin (ZITHROMAX) tablet 500 mg  500 mg Oral Q24H Erin Cabrera PA-C        sennosides-docusate (PERICOLACE) 8.6-50 MG per tablet 2 tablet  2 tablet Oral BID Jarvis Dougherty MD        And    polyethylene glycol (MIRALAX) packet 17 g  17 g Oral Daily PRN Jarvis Dougherty MD        And    bisacodyl (DULCOLAX) EC tablet 5 mg  5 mg Oral Daily PRN Jarvis Dougherty MD        And    bisacodyl (DULCOLAX) suppository 10 mg  10 mg Rectal Daily PRN Jarvis Dougherty MD        busPIRone (BUSPAR) tablet 15 mg  15 mg Oral TID PRN Erin Cabrera PA-C        cefTRIAXone (ROCEPHIN) 1,000 mg in sodium chloride 0.9 % 100 mL IVPB-VTB  1,000 mg Intravenous Q24H Jarvis Dougherty  mL/hr at 07/20/23 0006 1,000 mg at 07/20/23 0006    doxepin (SINEquan) capsule 10 mg  10 mg Oral Nightly Erin Cabrera PA-C        heparin (porcine) 5000 UNIT/ML injection 5,000 Units  5,000 Units Subcutaneous Q12H Jarvis Dougherty MD   5,000 Units at 07/20/23 0113    levothyroxine (SYNTHROID,  LEVOTHROID) tablet 75 mcg  75 mcg Oral Daily Erin Cabrera PA-C        Magnesium Low Dose Replacement - Follow Nurse / BPA Driven Protocol   Does not apply PRN Jarvis Dougherty MD        metoprolol succinate XL (TOPROL-XL) 24 hr tablet 50 mg  50 mg Oral Daily Erin Cabrera PA-C        pantoprazole (PROTONIX) EC tablet 40 mg  40 mg Oral Q AM Erin Cabrera PA-C   40 mg at 07/20/23 0512    [COMPLETED] potassium chloride (K-DUR,KLOR-CON) CR tablet 40 mEq  40 mEq Oral Once Ambrose Reddy MD   40 mEq at 07/19/23 2136    [COMPLETED] sodium chloride 0.9 % bolus 1,000 mL  1,000 mL Intravenous Once Ambrose Reddy MD 2,000 mL/hr at 07/19/23 2136 1,000 mL at 07/19/23 2136    sodium chloride 0.9 % flush 10 mL  10 mL Intravenous Q12H Jarvis Dougherty MD   10 mL at 07/20/23 0113    sodium chloride 0.9 % flush 10 mL  10 mL Intravenous PRN Jarvis Dougherty MD        sodium chloride 0.9 % infusion 40 mL  40 mL Intravenous PRN Jarvis Dougherty MD        sodium chloride 0.9 % infusion  100 mL/hr Intravenous Continuous Jarvis Dougherty  mL/hr at 07/20/23 0112 100 mL/hr at 07/20/23 0112     Orders (all)        Start     Ordered    07/22/23 0133  Auto Discontinue GI Panel in 48 Hours if not Collected  ONCE GI PANEL         07/20/23 0132    07/21/23 0900  azithromycin (ZITHROMAX) tablet 500 mg  Every 24 Hours Scheduled         07/20/23 0353    07/20/23 2100  doxepin (SINEquan) capsule 10 mg  Nightly         07/20/23 0225    07/20/23 0900  levothyroxine (SYNTHROID, LEVOTHROID) tablet 75 mcg  Daily         07/20/23 0225    07/20/23 0900  metoprolol succinate XL (TOPROL-XL) 24 hr tablet 50 mg  Daily         07/20/23 0225    07/20/23 0900  atorvastatin (LIPITOR) tablet 40 mg  Daily         07/20/23 0225 07/20/23 0800  Vital Signs  Every 8 Hours        Comments: Per per hospital policy    07/20/23 0037    07/20/23 0800  Oral Care  2 Times Daily       07/20/23 0037    07/20/23 0700  POC Glucose  Finger 4x Daily AC & at Bedtime  4 Times Daily Before Meals & at Bedtime       07/20/23 0028    07/20/23 0658  POC Glucose Once  PROCEDURE ONCE         07/20/23 0651    07/20/23 0600  Strict Intake & Output  Every 6 Hours         07/20/23 0037    07/20/23 0600  CBC Auto Differential  Morning Draw         07/20/23 0037    07/20/23 0600  Comprehensive Metabolic Panel  Morning Draw         07/20/23 0037    07/20/23 0600  pantoprazole (PROTONIX) EC tablet 40 mg  Every Early Morning         07/20/23 0225    07/20/23 0525  Inpatient Nephrology Consult  Once        Specialty:  Nephrology  Provider:  Yovana Rosales MD    07/20/23 0524    07/20/23 0445  azithromycin (ZITHROMAX) tablet 1,000 mg  Every 24 Hours Scheduled         07/20/23 0353    07/20/23 0354  Inpatient Infectious Diseases Consult  Once        Specialty:  Infectious Diseases  Provider:  (Not yet assigned)    07/20/23 0353    07/20/23 0346  Stool Culture, Targeted - Stool, Per Rectum  Once         07/20/23 0345    07/20/23 0225  busPIRone (BUSPAR) tablet 15 mg  3 Times Daily PRN         07/20/23 0225    07/20/23 0133  Gastrointestinal Panel, PCR - Stool, Per Rectum  Once         07/20/23 0132    07/20/23 0130  sodium chloride 0.9 % flush 10 mL  Every 12 Hours Scheduled         07/20/23 0037    07/20/23 0130  sennosides-docusate (PERICOLACE) 8.6-50 MG per tablet 2 tablet  2 Times Daily        See Hyperspace for full Linked Orders Report.    07/20/23 0037    07/20/23 0130  heparin (porcine) 5000 UNIT/ML injection 5,000 Units  Every 12 Hours Scheduled         07/20/23 0037    07/20/23 0130  sodium chloride 0.9 % infusion  Continuous         07/20/23 0037    07/20/23 0038  Notify Physician (with Parameters)  Until Discontinued         07/20/23 0037    07/20/23 0038  Insert Peripheral IV  Once         07/20/23 0037    07/20/23 0038  Saline Lock & Maintain IV Access  Continuous         07/20/23 0037    07/20/23 0038  Pulse Oximetry,  Spot  Once         07/20/23 0037     07/20/23 0038  Pulse Oximetry, Continuous  Continuous         07/20/23 0037    07/20/23 0038  Activity - Bed Rest With Exceptions (Specify)  Until Discontinued         07/20/23 0037    07/20/23 0038  Daily Weights  Daily       07/20/23 0037    07/20/23 0038  Diet: Liquid Diets; Clear Liquid; Texture: Regular Texture (IDDSI 7); Fluid Consistency: Thin (IDDSI 0)  Diet Effective Now         07/20/23 0037    07/20/23 0037  sodium chloride 0.9 % flush 10 mL  As Needed         07/20/23 0037    07/20/23 0037  sodium chloride 0.9 % infusion 40 mL  As Needed         07/20/23 0037    07/20/23 0037  polyethylene glycol (MIRALAX) packet 17 g  Daily PRN        See Hyperspace for full Linked Orders Report.    07/20/23 0037    07/20/23 0037  bisacodyl (DULCOLAX) EC tablet 5 mg  Daily PRN        See Hyperspace for full Linked Orders Report.    07/20/23 0037 07/20/23 0037  bisacodyl (DULCOLAX) suppository 10 mg  Daily PRN        See Hyperspace for full Linked Orders Report.    07/20/23 0037    07/20/23 0029  Hemoglobin A1c  Once         07/20/23 0028    07/20/23 0000  cefTRIAXone (ROCEPHIN) 1,000 mg in sodium chloride 0.9 % 100 mL IVPB-VTB  Every 24 Hours         07/19/23 2314 07/19/23 2315  Blood Culture - Blood, Arm, Right  STAT        See Hyperspace for full Linked Orders Report.    07/19/23 2314 07/19/23 2315  Blood Culture - Blood, Arm, Left  STAT        See Hyperspace for full Linked Orders Report.    07/19/23 2314 07/19/23 2314  Magnesium Low Dose Replacement - Follow Nurse / BPA Driven Protocol  As Needed         07/19/23 2314 07/19/23 2314  ECG 12 Lead QT Measurement  STAT         07/19/23 2313 07/19/23 2314  CK  STAT         07/19/23 2313 07/19/23 2311  Inpatient Admission  Once         07/19/23 2312 07/19/23 2311  Code Status and Medical Interventions:  Continuous         07/19/23 2312 07/19/23 2304  Lactic Acid, Plasma  STAT         07/19/23 2303    07/19/23 2052  potassium chloride  (K-DUR,KLOR-CON) CR tablet 40 mEq  Once         07/19/23 2036 07/19/23 2050  sodium chloride 0.9 % bolus 1,000 mL  Once         07/19/23 2034 07/19/23 2037  COVID PRE-OP / PRE-PROCEDURE SCREENING ORDER (NO ISOLATION) - Swab, Nasopharynx  Once         07/19/23 2036 07/19/23 2037  COVID-19 and FLU A/B PCR - Swab, Nasopharynx  PROCEDURE ONCE         07/19/23 2036 07/19/23 2035  CT Abdomen Pelvis Without Contrast  1 Time Imaging         07/19/23 2034 07/19/23 2003  High Sensitivity Troponin T 2Hr  PROCEDURE ONCE         07/19/23 1843    07/19/23 1828  Urine Culture - Urine, Urine, Clean Catch  Once         07/19/23 1827    07/19/23 1812  Urinalysis, Microscopic Only - Urine, Clean Catch  Once         07/19/23 1811    07/19/23 1734  BNP  Once         07/19/23 1733    07/19/23 1734  High Sensitivity Troponin T  Once         07/19/23 1733    07/19/23 1734  Sedimentation Rate  Once         07/19/23 1733    07/19/23 1734  C-reactive Protein  Once         07/19/23 1733    07/19/23 1734  T4, Free  Once         07/19/23 1733    07/19/23 1734  TSH  Once         07/19/23 1733    07/19/23 1734  Magnesium  Once         07/19/23 1733    07/19/23 1733  CBC & Differential  Once         07/19/23 1733    07/19/23 1733  Comprehensive Metabolic Panel  Once         07/19/23 1733    07/19/23 1733  Urinalysis With Culture If Indicated - Urine, Clean Catch  Once         07/19/23 1733    07/19/23 1733  CBC Auto Differential  PROCEDURE ONCE         07/19/23 1733    Unscheduled  Up With Assistance  As Needed       07/20/23 0037    --  lisinopril (PRINIVIL,ZESTRIL) 40 MG tablet  Daily         07/20/23 0045    --  doxepin (SINEquan) 10 MG capsule  Nightly         07/20/23 0049                  Physician Progress Notes (all)    No notes of this type exist for this encounter.       Consult Notes (all)    No notes of this type exist for this encounter.

## 2023-07-20 NOTE — CONSULTS
INFECTIOUS DISEASE CONSULTATION REPORT        Patient Identification:  Name:  Arabella Rose  Age:  48 y.o.  Sex:  female  :  1975  MRN:  [id number]   Visit Number:  [id number]  Primary Care Physician:  Benigno Verduzco PA-C        Referring Provider:  Dr. Mitchell    Reason for consult: Salmonella infection       LOS: 1 day        Subjective       Subjective     History of present illness:      Thank you Dr. Mitchell for allowing us to participate in the care of your patient.  As you well know, Ms. Arabella Rose is a 48 y.o. female with past medical history significant for type 2 diabetes, PE, hypertension, anxiety/depression, hypothyroidism, GERD, hyperlipidemia, who presented to [facility] Emergency Department on 2023 for elevated creatinine.  WBC stable at 12.73.  CRP 1.48.  Lactic acid normal at 1.0.  GI panel PCR detected Salmonella.  Urinalysis from 2023 slightly positive with 6-12 WBCs, however contaminated specimen with 7-12 squamous cells, culture showing no growth thus far.  COVID-19 and influenza PCR negative.  Blood cultures from 2023 currently in process.  Stool culture currently in progress.  CT of the abdomen and pelvis from 2023 reports no evidence of acute abdominal or pelvic abnormality, interval resolution of right adnexal cyst.    Patient reports she was visiting family around  in [place] [employer] and began having nausea vomiting and diarrhea shortly after.  Patient went to the ED in [place].  Since arrival at home patient denies any further nausea or vomiting but is still having abdominal cramping and watery diarrhea that is slowly been improving.  Today patient is resting comfortably in bed.  Currently on room air with no apparent distress.  Lungs clear to auscultation bilaterally.  Abdomen mildly distended, mild cramping reported.  Patient reports stools becoming more formed.      Infectious Disease consultation was  requested for antimicrobial management.      ---------------------------------------------------------------------------------------------------------------------     Review Of Systems:    Constitutional: no fever, chills and night sweats. No appetite change or unexpected weight change. No fatigue.  Eyes: no eye drainage, itching or redness.  HEENT: no mouth sores, dysphagia or nose bleed.  Respiratory: no for shortness of breath, cough or production of sputum.  Cardiovascular: no chest pain, no palpitations, no orthopnea.  Gastrointestinal: no nausea, vomiting. Positive diarrhea. No abdominal pain, hematemesis or rectal bleeding.  Genitourinary: no dysuria or polyuria.  Positive decreased urinary output.  Hematologic/lymphatic: no lymph node abnormalities, no easy bruising or easy bleeding.  Musculoskeletal: no muscle or joint pain.  Skin: No rash and no itching.  Neurological: no loss of consciousness, no seizure, no headache.  Behavioral/Psych: no depression or suicidal ideation.  Endocrine: no hot flashes.  Immunologic: negative.    ---------------------------------------------------------------------------------------------------------------------     Past Medical History    Past Medical History:   Diagnosis Date    Anxiety     Depression     Diabetes mellitus     High cholesterol     History of pulmonary embolus (PE)     Hypertension     Hypothyroidism     Sleep apnea        Past Surgical History    Past Surgical History:   Procedure Laterality Date    CHOLECYSTECTOMY      EYE SURGERY      PULMONARY EMBOLISM SURGERY         Family History    Family History   Problem Relation Age of Onset    Stroke Mother     Diabetes Father     Heart disease Father     Hypertension Father        Social History    Social History     Tobacco Use    Smoking status: Never    Smokeless tobacco: Never   Vaping Use    Vaping Use: Never used   Substance Use Topics    Alcohol use: No    Drug use: No       Allergies    Codeine and  Prozac [fluoxetine hcl]  ---------------------------------------------------------------------------------------------------------------------     Home Medications:    Prior to Admission Medications       Prescriptions Last Dose Informant Patient Reported? Taking?    atorvastatin (LIPITOR) 40 MG tablet 7/19/2023 Self Yes Yes    Take 1 tablet by mouth Daily.    busPIRone (BUSPAR) 15 MG tablet 7/19/2023 Self Yes Yes    Take 1 tablet by mouth 3 (Three) Times a Day As Needed (anxiety).    levothyroxine (SYNTHROID, LEVOTHROID) 75 MCG tablet 7/19/2023 Self Yes Yes    Take 1 tablet by mouth Daily.    lisinopril (PRINIVIL,ZESTRIL) 40 MG tablet 7/19/2023 Self Yes Yes    Take 1 tablet by mouth Daily.    metFORMIN (GLUCOPHAGE) 500 MG tablet 7/19/2023 Self Yes Yes    Take 1 tablet by mouth Every 12 (Twelve) Hours.    metoprolol succinate XL (TOPROL-XL) 50 MG 24 hr tablet 7/19/2023 Self Yes Yes    Take 1 tablet by mouth Daily.    omeprazole (priLOSEC) 20 MG capsule 7/19/2023 Self Yes Yes    Take 1 capsule by mouth Daily.    doxepin (SINEquan) 10 MG capsule 7/18/2023 Self Yes No    Take 1 capsule by mouth Every Night.    pramipexole (MIRAPEX) 0.25 MG tablet 7/18/2023 Self Yes No    Take 1 tablet by mouth Every Night.          ---------------------------------------------------------------------------------------------------------------------    Objective       Objective     Hospital Scheduled Meds:  atorvastatin, 40 mg, Oral, Daily  [START ON 7/21/2023] cefTRIAXone, 2,000 mg, Intravenous, Q24H  doxepin, 10 mg, Oral, Nightly  heparin (porcine), 5,000 Units, Subcutaneous, Q12H  levothyroxine, 75 mcg, Oral, Daily  metoprolol succinate XL, 50 mg, Oral, Daily  pantoprazole, 40 mg, Oral, Q AM  senna-docusate sodium, 2 tablet, Oral, BID  sodium chloride, 10 mL, Intravenous, Q12H      sodium bicarbonate drip less than/equal to 75 mEq/bag, 75 mEq, Last Rate: 75 mEq (07/20/23  1102)      ---------------------------------------------------------------------------------------------------------------------   Vital Signs:  Temp:  [97 °F (36.1 °C)-97.7 °F (36.5 °C)] 97.5 °F (36.4 °C)  Heart Rate:  [] 83  Resp:  [18-20] 20  BP: (105-145)/(70-77) 105/70  No data found.  SpO2 Percentage    07/20/23 0032 07/20/23 0639 07/20/23 0918   SpO2: 96% 95% 96%     SpO2:  [92 %-96 %] 96 %  on   ;   Device (Oxygen Therapy): room air    Body mass index is 45.81 kg/m².  Wt Readings from Last 3 Encounters:   07/20/23 121 kg (267 lb)   05/19/23 125 kg (275 lb 14.4 oz)   02/12/23 124 kg (273 lb)     ---------------------------------------------------------------------------------------------------------------------     Physical Exam:    Constitutional:  Well-developed and well-nourished.  No respiratory distress.      HENT:  Head: Normocephalic and atraumatic.  Mouth:  Moist mucous membranes.    Eyes:  Conjunctivae and EOM are normal.  No scleral icterus.  Neck:  Neck supple.  No JVD present.    Cardiovascular:  Normal rate, regular rhythm and normal heart sounds with no murmur. No edema.  Pulmonary/Chest:  No respiratory distress, no wheezes, no crackles, with normal breath sounds and good air movement.  Abdominal:  Soft.  Bowel sounds are normal.  Mild distension and tenderness.   Musculoskeletal:  No edema, no tenderness, and no deformity.  No swelling or redness of joints.  Neurological:  Alert and oriented to person, place, and time.  No facial droop.  No slurred speech.   Skin:  Skin is warm and dry.  No rash noted.  No pallor.   Psychiatric:  Normal mood and affect.  Behavior is normal.    ---------------------------------------------------------------------------------------------------------------------    Results from last 7 days   Lab Units 07/19/23 2025 07/19/23  1803   CK TOTAL U/L 193*  --    HSTROP T ng/L 27* 29*     Results from last 7 days   Lab Units 07/19/23  1803   PROBNP pg/mL 83.4          Results from last 7 days   Lab Units 07/20/23  0144 07/19/23  2338 07/19/23  1803   CRP mg/dL  --   --  1.48*   LACTATE mmol/L  --  1.0  --    WBC 10*3/mm3 12.73*  --  12.93*   HEMOGLOBIN g/dL 11.5*  --  13.1   HEMATOCRIT % 35.7  --  39.3   MCV fL 84.4  --  83.4   MCHC g/dL 32.2  --  33.3   PLATELETS 10*3/mm3 395  --  496*     Results from last 7 days   Lab Units 07/20/23  0144 07/19/23  1803   SODIUM mmol/L 133* 136   POTASSIUM mmol/L 3.1* 3.3*   MAGNESIUM mg/dL  --  1.9   CHLORIDE mmol/L 98 97*   CO2 mmol/L 19.8* 22.3   BUN mg/dL 21* 21*   CREATININE mg/dL 5.15* 5.20*   CALCIUM mg/dL 9.1 9.9   GLUCOSE mg/dL 124* 130*   ALBUMIN g/dL 3.5 4.1   BILIRUBIN mg/dL 0.5 0.6   ALK PHOS U/L 70 79   AST (SGOT) U/L 21 24   ALT (SGPT) U/L 32 35*   Estimated Creatinine Clearance: 16.8 mL/min (A) (by C-G formula based on SCr of 5.15 mg/dL (H)).  No results found for: AMMONIA    Hemoglobin A1C   Date/Time Value Ref Range Status   07/20/2023 0144 6.00 (H) 4.80 - 5.60 % Final     Glucose   Date/Time Value Ref Range Status   07/20/2023 1045 142 (H) 70 - 130 mg/dL Final     Comment:     Meter: [id number] : [id number] ELAINE BROWN   07/20/2023 0651 112 70 - 130 mg/dL Final     Comment:     Meter: [id number] : [id number] MARIANA HOU     Lab Results   Component Value Date    HGBA1C 6.00 (H) 07/20/2023     Lab Results   Component Value Date    TSH 1.320 07/19/2023    FREET4 1.82 (H) 07/19/2023       No results found for: BLOODCX  Urine Culture   Date Value Ref Range Status   07/19/2023 No growth  Preliminary     No results found for: WOUNDCX  No results found for: STOOLCX  No results found for: RESPCX  Pain Management Panel  More data exists         Latest Ref Rng & Units 5/17/2023 10/21/2021   Pain Management Panel   Creatinine, Urine mg/dL 369.6  -   Amphetamine, Urine Qual Negative - Negative    Barbiturates Screen, Urine Negative - Negative    Benzodiazepine Screen, Urine Negative - Negative    Buprenorphine, Screen, Urine  Negative - Negative    Cocaine Screen, Urine Negative - Negative    Methadone Screen , Urine Negative - Negative    Methamphetamine, Ur Negative - Negative      I have personally reviewed the above laboratory results.   ---------------------------------------------------------------------------------------------------------------------  Imaging Results (Last 7 Days)       Procedure Component Value Units Date/Time    CT Abdomen Pelvis Without Contrast [[id number]] Collected: 07/19/23 2155     Updated: 07/19/23 2159    Narrative:            Procedure: CT abdomen pelvis without intravenous contrast.  Sagittal and  coronal reformations are supplied.     Comparison: CT abdomen pelvis 5/17/2023.     Findings: Lung bases are normal.     Evaluation of solid organs is limited without the use of intravenous  contrast.  Allowing for this, fatty infiltration of the liver noted.   Surgical absence of the gallbladder present.  Noncontrast enhanced  pancreas, spleen, adrenals and kidneys are morphologically unremarkable.   No obstructing uropathy.  Appendix is normal in the right lower  quadrant.  CT appearance of the uterus and adnexa within normal limits.   Urinary bladder partly distended and morphologically unremarkable.  Mild  diverticulosis of the sigmoid colon.  No inflammatory change.  Aorta and  IVC have a normal appearance.     No ascites, adenopathy or free fluid in the pelvis.     In bone windows, moderate to advanced facet hypertrophic changes at  L3-L4 through L5-S1.       Impression:         No CT evidence of an acute abdominal or pelvic abnormality.     Interval resolution of the right adnexal cyst.     This report was finalized on 7/19/2023 9:57 PM by Pilar Rojas MD.             I have personally reviewed the above radiology results.   ---------------------------------------------------------------------------------------------------------------------      Pertinent Infectious Disease  Results          Assessment & Plan      Assessment      Salmonella gastroenteritis  UTI        Plan      Patient presents with elevated creatinine.  WBC stable at 12.73.  CRP 1.48.  Lactic acid normal at 1.0.  GI panel PCR detected Salmonella.  Urinalysis from 7/19/2023 slightly positive with 6-12 WBCs, however contaminated specimen with 7-12 squamous cells, culture showing no growth thus far.  COVID-19 and influenza PCR negative.  Blood cultures from 7/19/2023 currently in process.  Stool culture currently in progress.  CT of the abdomen and pelvis from 7/19/2023 reports no evidence of acute abdominal or pelvic abnormality, interval resolution of right adnexal cyst.    Patient reports she was visiting family around 4 July in [place] for [place] and began having nausea vomiting and diarrhea shortly after.  Patient went to the ED in [place].  Since arrival at home patient denies any further nausea or vomiting but is still having abdominal cramping and watery diarrhea that is slowly been improving.  Today patient is resting comfortably in bed.  Currently on room air with no apparent distress.  Lungs clear to auscultation bilaterally.  Abdomen mildly distended, mild cramping reported.  Patient reports stools becoming more formed.    For now antibiotic therapy was de-escalated to ceftriaxone 2 g IV every 24 hours for treatment of Salmonella gastroenteritis and possible UTI pending finalization of urine culture results.  Azithromycin was discontinued.  We will continue to follow closely and adjust antibiotic therapy as needed.        ANTIMICROBIAL THERAPY    cefTRIAXone (ROCEPHIN) 2000 mg IVPB in 100 mL NS (VTB)       Again, thank you Dr. Mitchell for allowing us to participate in the care of your patient and please feel free to call for any questions you may have.        Code Status:     Code Status and Medical Interventions:   Ordered at: 07/19/23 0439     Level Of Support Discussed With:    Patient     Code  Status (Patient has no pulse and is not breathing):    CPR (Attempt to Resuscitate)     Medical Interventions (Patient has pulse or is breathing):    Full Support     Release to patient:    Routine Release         MARTHA Jones  07/20/23  13:11 EDT

## 2023-07-20 NOTE — PROGRESS NOTES
Patient Identification:  Name:  Arabella Rose  Age:  48 y.o.  Sex:  female  :  1975  MRN:  [id number]  Visit Number:  [id number]  Primary Care Provider:  Benigno Verduzco PA-C    Length of stay:  1    Subjective/Interval History/Consultants/Procedures     Chief Complaint:   Chief Complaint   Patient presents with    Urinary Retention       Subjective/Interval History:    48 y.o. female who was admitted on 2023 with salmonella gastroenteritis, JESÚS     PMH is significant for T2DM, HTN, HLD, Hypothyroidism, anxiety/depression, GERD  For complete admission information, please see history and physical.     Consultations:  Infectious disease  Nephrology     Procedures/Scans:  CT abdomen and pelvis w/o contrast     Today, the patient was seen and examined resting comfortably in no acute distress. She reports feeling overall about the same this AM. She states diarrhea seems to be resolving, becoming more formed. No new complaints today. No SOB or cough. No CP or palpitations.      Room location at the time of evaluation was [facility].    ----------------------------------------------------------------------------------------------------------------------  Current [facility] Meds:  atorvastatin, 40 mg, Oral, Daily  [START ON 2023] azithromycin, 500 mg, Oral, Q24H  cefTRIAXone, 1,000 mg, Intravenous, Q24H  doxepin, 10 mg, Oral, Nightly  heparin (porcine), 5,000 Units, Subcutaneous, Q12H  levothyroxine, 75 mcg, Oral, Daily  metoprolol succinate XL, 50 mg, Oral, Daily  pantoprazole, 40 mg, Oral, Q AM  senna-docusate sodium, 2 tablet, Oral, BID  sodium chloride, 10 mL, Intravenous, Q12H      sodium chloride, 100 mL/hr, Last Rate: 100 mL/hr (23 0112)      ----------------------------------------------------------------------------------------------------------------------      Objective     Vital Signs:  Temp:  [97 °F (36.1 °C)-97.7 °F (36.5 °C)] 97.5 °F (36.4 °C)  Heart Rate:  [] 83  Resp:   [18-20] 20  BP: (105-145)/(70-77) 105/70      07/19/23  2309 07/20/23  0032 07/20/23  0500   Weight: 125 kg (275 lb) 121 kg (267 lb) 121 kg (267 lb)     Body mass index is 45.81 kg/m².    Intake/Output Summary (Last 24 hours) at 7/20/2023 0805  Last data filed at 7/20/2023 0639  Gross per 24 hour   Intake 474.66 ml   Output --   Net 474.66 ml     No intake/output data recorded.  Diet: Liquid Diets; Clear Liquid; Texture: Regular Texture (IDDSI 7); Fluid Consistency: Thin (IDDSI 0)  ----------------------------------------------------------------------------------------------------------------------    Physical Exam  Vitals and nursing note reviewed.   Constitutional:       General: She is not in acute distress.  HENT:      Head: Normocephalic and atraumatic.   Eyes:      Extraocular Movements: Extraocular movements intact.      Conjunctiva/sclera: Conjunctivae normal.   Cardiovascular:      Rate and Rhythm: Normal rate and regular rhythm.   Pulmonary:      Effort: Pulmonary effort is normal.      Breath sounds: Normal breath sounds.   Abdominal:      Palpations: Abdomen is soft.      Tenderness: There is no abdominal tenderness.   Musculoskeletal:      Right lower leg: No edema.      Left lower leg: No edema.   Skin:     General: Skin is warm and dry.   Neurological:      Mental Status: She is alert. Mental status is at baseline.   Psychiatric:         Mood and Affect: Mood normal.         Behavior: Behavior normal.              ----------------------------------------------------------------------------------------------------------------------  Tele:      ----------------------------------------------------------------------------------------------------------------------  Results from last 7 days   Lab Units 07/19/23 2025 07/19/23 1803   CK TOTAL U/L 193*  --    HSTROP T ng/L 27* 29*   PROBNP pg/mL  --  83.4     Results from last 7 days   Lab Units 07/20/23  0144 07/19/23 2338 07/19/23  1803   CRP mg/dL  --    --  1.48*   LACTATE mmol/L  --  1.0  --    WBC 10*3/mm3 12.73*  --  12.93*   HEMOGLOBIN g/dL 11.5*  --  13.1   HEMATOCRIT % 35.7  --  39.3   MCV fL 84.4  --  83.4   MCHC g/dL 32.2  --  33.3   PLATELETS 10*3/mm3 395  --  496*         Results from last 7 days   Lab Units 07/20/23  0144 07/19/23  1803   SODIUM mmol/L 133* 136   POTASSIUM mmol/L 3.1* 3.3*   MAGNESIUM mg/dL  --  1.9   CHLORIDE mmol/L 98 97*   CO2 mmol/L 19.8* 22.3   BUN mg/dL 21* 21*   CREATININE mg/dL 5.15* 5.20*   CALCIUM mg/dL 9.1 9.9   GLUCOSE mg/dL 124* 130*   ALBUMIN g/dL 3.5 4.1   BILIRUBIN mg/dL 0.5 0.6   ALK PHOS U/L 70 79   AST (SGOT) U/L 21 24   ALT (SGPT) U/L 32 35*   Estimated Creatinine Clearance: 16.8 mL/min (A) (by C-G formula based on SCr of 5.15 mg/dL (H)).  No results found for: AMMONIA      No results found for: BLOODCX  No results found for: URINECX  No results found for: WOUNDCX  No results found for: STOOLCX  ----------------------------------------------------------------------------------------------------------------------  Imaging Results (Last 24 Hours)       Procedure Component Value Units Date/Time    CT Abdomen Pelvis Without Contrast [[id number]] Collected: 07/19/23 2155     Updated: 07/19/23 2159    Narrative:            Procedure: CT abdomen pelvis without intravenous contrast.  Sagittal and  coronal reformations are supplied.     Comparison: CT abdomen pelvis 5/17/2023.     Findings: Lung bases are normal.     Evaluation of solid organs is limited without the use of intravenous  contrast.  Allowing for this, fatty infiltration of the liver noted.   Surgical absence of the gallbladder present.  Noncontrast enhanced  pancreas, spleen, adrenals and kidneys are morphologically unremarkable.   No obstructing uropathy.  Appendix is normal in the right lower  quadrant.  CT appearance of the uterus and adnexa within normal limits.   Urinary bladder partly distended and morphologically unremarkable.  Mild  diverticulosis of the  sigmoid colon.  No inflammatory change.  Aorta and  IVC have a normal appearance.     No ascites, adenopathy or free fluid in the pelvis.     In bone windows, moderate to advanced facet hypertrophic changes at  L3-L4 through L5-S1.       Impression:         No CT evidence of an acute abdominal or pelvic abnormality.     Interval resolution of the right adnexal cyst.     This report was finalized on 7/19/2023 9:57 PM by Pilar Rojas MD.             ----------------------------------------------------------------------------------------------------------------------   I have reviewed the above laboratory values for 07/20/23    Assessment/Plan     Active Hospital Problems    Diagnosis  POA    **JESÚS (acute kidney injury) [N17.9]  Yes         ASSESSMENT/PLAN:    Sepsis, POA, now resolved, 2/2 salmonella gastroenteritis   JESÚS, POA,  likely 2/2 above with concern for ATN  Hypokalemia, POA, likely 2/2 #1  Possible urinary tract infection, POA  Patient with 2-week history of N/V/D which [place].  She was seen in an ED in [place] 6 days ago and treated for JESÚS with fluid replacement.  Presents with creatinine 5.2.  Hypokalemic.  Meeting sepsis criteria on arrival with tachycardia, leukocytosis and GI PCR positive for Salmonella.  UA on arrival with trace leukocytes and 6-12 WBCs with 2+ bacteria however notably poor sample with 7-12 squamous epis appreciated.  She was started on clear liquid diet, Rocephin and azithromycin  Nephrology consulted given limited improvement in creatinine 5.2-5.15 following fluid replacement in the ED with concern noted per admitting physician for potential ATN.  We will also consult infectious disease for further assistance with antimicrobial therapy, assistance appreciated. Have initiated monotherapy with rocephin pending culture results.   Blood and urine cultures remain pending.  A.m. labs notable for interval decrease in potassium  to 3.1.  Leukocytosis remains at 12.73.  VSS, no longer meeting sepsis criteria.   Continue supportive care and advance diet as patient tolerates  Avoid nephrotoxins as able and monitor I's and O's    Chronic:  T2DM  HTN  HLD  Hypothyroidism  Anxiety/depression  GERD  Continuing home meds as indicated per med rec holding nephrotoxins as appropriate  Home metformin held on admission.  Glucose trend currently around 100.  We will continue to monitor, can plan to initiate SSI if tighter glycemic control is needed.  A1c this admission 6.0 indicating well-controlled diabetes.  -----------  -DVT prophylaxis: Subcu heparin  -Disposition plans/anticipated needs: Pending course        The patient is high risk due to the following diagnoses/reasons: Sepsis, gastroenteritis, JESÚS        Solitario Jean PA-C  07/20/23  08:05 EDT

## 2023-07-20 NOTE — ED PROVIDER NOTES
[facility]  emergency department encounter        Pt Name: Arabella Rose  MRN: [id number]  Birthdate 1975  Date of evaluation: 7/20/2023    Chief Complaint   Patient presents with    Urinary Retention             HISTORY OF PRESENT ILLNESS:   Arabella Rose is a 48 y.o. female PMH HTN, HLD, DM, hypothyroid, sleep apnea, PE.    Patient presents for elevated creatinine.  Patient had recent JESÚS, seen at outside facility 6 days ago and received 2 L fluid bolus.  Follow-up with primary care provider sent her here due to her elevated creatinine.  Patient endorses mild nonfocal abdominal cramping but no other significant symptoms.  Denies fever flank pain dysuria nausea vomiting.              PCP: Benigno Verduzco PA-C          REVIEW OF SYSTEMS:     Review of Systems   Constitutional:  Negative for fever.   HENT:  Negative for congestion and rhinorrhea.    Eyes:  Negative for visual disturbance.   Respiratory:  Negative for cough and shortness of breath.    Cardiovascular:  Negative for chest pain.   Gastrointestinal:  Negative for abdominal pain, nausea and vomiting.        Abdominal cramping   Genitourinary:  Negative for dysuria.   Musculoskeletal:  Negative for myalgias.   Skin:  Negative for rash.   Neurological:  Negative for headaches.   Psychiatric/Behavioral:  Negative for confusion.      Review of systems otherwise as per HPI.          PREVIOUS HISTORY:         Past Medical History:   Diagnosis Date    Anxiety     Depression     Diabetes mellitus     High cholesterol     History of pulmonary embolus (PE)     Hypertension     Hypothyroidism     Sleep apnea            Past Surgical History:   Procedure Laterality Date    CHOLECYSTECTOMY      EYE SURGERY      PULMONARY EMBOLISM SURGERY             Social History     Socioeconomic History    Marital status:    Tobacco Use    Smoking status: Never    Smokeless tobacco: Never   Vaping Use    Vaping Use: Never used   Substance and  "Sexual Activity    Alcohol use: No    Drug use: No    Sexual activity: Defer           Family History   Problem Relation Age of Onset    Stroke Mother     Diabetes Father     Heart disease Father     Hypertension Father          Current Outpatient Medications   Medication Instructions    atorvastatin (LIPITOR) 40 mg, Oral, Daily    busPIRone (BUSPAR) 15 mg, Oral, 3 Times Daily PRN    doxepin (SINEQUAN) 10 mg, Oral, Nightly    levothyroxine (SYNTHROID, LEVOTHROID) 75 mcg, Oral, Daily    lisinopril (PRINIVIL,ZESTRIL) 40 mg, Oral, Daily    metFORMIN (GLUCOPHAGE) 500 mg, Oral, Every 12 Hours    metoprolol succinate XL (TOPROL-XL) 50 mg, Oral, Daily    omeprazole (PRILOSEC) 20 mg, Oral, Daily    pramipexole (MIRAPEX) 0.25 mg, Oral, Nightly         Allergies:  Codeine and Prozac [fluoxetine hcl]          PHYSICAL EXAM:     Patient Vitals for the past 24 hrs:   BP Temp Temp src Pulse Resp SpO2 Height Weight   07/20/23 0032 123/77 97.7 °F (36.5 °C) Oral 91 20 96 % -- 121 kg (267 lb)   07/19/23 2309 110/75 97 °F (36.1 °C) -- 97 20 92 % 162.6 cm (64.02\") 125 kg (275 lb)   07/19/23 1704 145/77 -- -- -- -- -- -- --   07/19/23 1702 -- 97 °F (36.1 °C) Infrared 105 18 96 % 162.6 cm (64\") --       Physical Exam  Vitals and nursing note reviewed.   Constitutional:       General: She is not in acute distress.  HENT:      Head: Normocephalic and atraumatic.   Eyes:      Extraocular Movements: Extraocular movements intact.      Conjunctiva/sclera: Conjunctivae normal.   Pulmonary:      Effort: Pulmonary effort is normal. No respiratory distress.   Abdominal:      General: Abdomen is flat. There is no distension.      Palpations: Abdomen is soft.      Tenderness: There is no abdominal tenderness. There is no guarding or rebound.      Comments: Bedside physician ultrasound performed, no urinary retention   Musculoskeletal:         General: No deformity. Normal range of motion.      Cervical back: Normal range of motion. No rigidity. "   Skin:     Coloration: Skin is not jaundiced.      Findings: No rash.   Neurological:      General: No focal deficit present.      Mental Status: She is alert and oriented to person, place, and time.   Psychiatric:         Mood and Affect: Mood normal.         Behavior: Behavior normal.           COMPLETED DIAGNOSTIC STUDIES AND INTERVENTIONS:     Lab Results (last 24 hours)       Procedure Component Value Units Date/Time    CBC & Differential [[id number]]  (Abnormal) Collected: 07/19/23 1803    Specimen: Blood Updated: 07/19/23 1810    Narrative:      The following orders were created for panel order CBC & Differential.  Procedure                               Abnormality         Status                     ---------                               -----------         ------                     CBC Auto Differential[[id number]]        Abnormal            Final result                 Please view results for these tests on the individual orders.    Comprehensive Metabolic Panel [[id number]]  (Abnormal) Collected: 07/19/23 1803    Specimen: Blood Updated: 07/19/23 1842     Glucose 130 mg/dL      BUN 21 mg/dL      Creatinine 5.20 mg/dL      Sodium 136 mmol/L      Potassium 3.3 mmol/L      Chloride 97 mmol/L      CO2 22.3 mmol/L      Calcium 9.9 mg/dL      Total Protein 7.7 g/dL      Albumin 4.1 g/dL      ALT (SGPT) 35 U/L      AST (SGOT) 24 U/L      Alkaline Phosphatase 79 U/L      Total Bilirubin 0.6 mg/dL      Globulin 3.6 gm/dL      A/G Ratio 1.1 g/dL      BUN/Creatinine Ratio 4.0     Anion Gap 16.7 mmol/L      eGFR 9.5 mL/min/1.73      Comment: <15 Indicative of kidney failure       Narrative:      GFR Normal >60  Chronic Kidney Disease <60  Kidney Failure <15      Urinalysis With Culture If Indicated - Urine, Clean Catch [[id number]]  (Abnormal) Collected: 07/19/23 1803    Specimen: Urine, Clean Catch Updated: 07/19/23 1814     Color, UA Dark Yellow     Appearance, UA Cloudy     pH, UA <=5.0     Specific Gravity, UA  1.018     Glucose, UA Negative     Ketones, UA 15 mg/dL (1+)     Bilirubin, UA Small (1+)     Blood, UA Negative     Protein,  mg/dL (2+)     Leuk Esterase, UA Trace     Nitrite, UA Negative     Urobilinogen, UA 0.2 E.U./dL    Narrative:      In absence of clinical symptoms, the presence of pyuria, bacteria, and/or nitrites on the urinalysis result does not correlate with infection.    BNP [[id number]]  (Normal) Collected: 07/19/23 1803    Specimen: Blood Updated: 07/19/23 1843     proBNP 83.4 pg/mL     Narrative:      Among patients with dyspnea, NT-proBNP is highly sensitive for the detection of acute congestive heart failure. In addition NT-proBNP of <300 pg/ml effectively rules out acute congestive heart failure with 99% negative predictive value.    Results may be falsely decreased if patient taking Biotin.      High Sensitivity Troponin T [[id number]]  (Abnormal) Collected: 07/19/23 1803    Specimen: Blood Updated: 07/19/23 1843     HS Troponin T 29 ng/L     Narrative:      High Sensitive Troponin T Reference Range:  <10.0 ng/L- Negative Female for AMI  <15.0 ng/L- Negative Male for AMI  >=10 - Abnormal Female indicating possible myocardial injury.  >=15 - Abnormal Male indicating possible myocardial injury.   Clinicians would have to utilize clinical acumen, EKG, Troponin, and serial changes to determine if it is an Acute Myocardial Infarction or myocardial injury due to an underlying chronic condition.         Sedimentation Rate [[id number]]  (Normal) Collected: 07/19/23 1803    Specimen: Blood Updated: 07/19/23 1813     Sed Rate 17 mm/hr     C-reactive Protein [[id number]]  (Abnormal) Collected: 07/19/23 1803    Specimen: Blood Updated: 07/19/23 1842     C-Reactive Protein 1.48 mg/dL     T4, Free [[id number]]  (Abnormal) Collected: 07/19/23 1803    Specimen: Blood Updated: 07/19/23 1843     Free T4 1.82 ng/dL     Narrative:      Results may be falsely increased if patient taking Biotin.      TSH  [[id number]]  (Normal) Collected: 07/19/23 1803    Specimen: Blood Updated: 07/19/23 1843     TSH 1.320 uIU/mL     Magnesium [[id number]]  (Normal) Collected: 07/19/23 1803    Specimen: Blood Updated: 07/19/23 1842     Magnesium 1.9 mg/dL     CBC Auto Differential [[id number]]  (Abnormal) Collected: 07/19/23 1803    Specimen: Blood Updated: 07/19/23 1810     WBC 12.93 10*3/mm3      RBC 4.71 10*6/mm3      Hemoglobin 13.1 g/dL      Hematocrit 39.3 %      MCV 83.4 fL      MCH 27.8 pg      MCHC 33.3 g/dL      RDW 13.2 %      RDW-SD 39.9 fl      MPV 8.7 fL      Platelets 496 10*3/mm3      Neutrophil % 59.1 %      Lymphocyte % 28.4 %      Monocyte % 8.7 %      Eosinophil % 1.2 %      Basophil % 0.6 %      Immature Grans % 2.0 %      Neutrophils, Absolute 7.64 10*3/mm3      Lymphocytes, Absolute 3.67 10*3/mm3      Monocytes, Absolute 1.13 10*3/mm3      Eosinophils, Absolute 0.15 10*3/mm3      Basophils, Absolute 0.08 10*3/mm3      Immature Grans, Absolute 0.26 10*3/mm3      nRBC 0.0 /100 WBC     Urinalysis, Microscopic Only - Urine, Clean Catch [[id number]]  (Abnormal) Collected: 07/19/23 1803    Specimen: Urine, Clean Catch Updated: 07/19/23 1827     RBC, UA 0-2 /HPF      WBC, UA 6-12 /HPF      Bacteria, UA 2+ /HPF      Squamous Epithelial Cells, UA 7-12 /HPF      Hyaline Casts, UA 7-12 /LPF      Methodology Manual Light Microscopy    Urine Culture - Urine, Urine, Clean Catch [[id number]] Collected: 07/19/23 1803    Specimen: Urine, Clean Catch Updated: 07/19/23 1827    High Sensitivity Troponin T 2Hr [[id number]]  (Abnormal) Collected: 07/19/23 2025    Specimen: Blood Updated: 07/19/23 2053     HS Troponin T 27 ng/L      Troponin T Delta -2 ng/L     Narrative:      High Sensitive Troponin T Reference Range:  <10.0 ng/L- Negative Female for AMI  <15.0 ng/L- Negative Male for AMI  >=10 - Abnormal Female indicating possible myocardial injury.  >=15 - Abnormal Male indicating possible myocardial injury.   Clinicians would have to  utilize clinical acumen, EKG, Troponin, and serial changes to determine if it is an Acute Myocardial Infarction or myocardial injury due to an underlying chronic condition.         CK [[id number]]  (Abnormal) Collected: 07/19/23 2025    Specimen: Blood Updated: 07/19/23 2326     Creatine Kinase 193 U/L     COVID PRE-OP / PRE-PROCEDURE SCREENING ORDER (NO ISOLATION) - Swab, Nasopharynx [[id number]]  (Normal) Collected: 07/19/23 2139    Specimen: Swab from Nasopharynx Updated: 07/19/23 2202    Narrative:      The following orders were created for panel order COVID PRE-OP / PRE-PROCEDURE SCREENING ORDER (NO ISOLATION) - Swab, Nasopharynx.  Procedure                               Abnormality         Status                     ---------                               -----------         ------                     COVID-19 and FLU A/B PCR...[[id number]]  Normal              Final result                 Please view results for these tests on the individual orders.    COVID-19 and FLU A/B PCR - Swab, Nasopharynx [[id number]]  (Normal) Collected: 07/19/23 2139    Specimen: Swab from Nasopharynx Updated: 07/19/23 2202     COVID19 Not Detected     Influenza A PCR Not Detected     Influenza B PCR Not Detected    Narrative:      Fact sheet for providers: https://www.fda.gov/media/356407/download    Fact sheet for patients: https://www.fda.gov/media/373342/download    Test performed by PCR.    Lactic Acid, Plasma [[id number]]  (Normal) Collected: 07/19/23 2338    Specimen: Blood from Arm, Right Updated: 07/20/23 0006     Lactate 1.0 mmol/L     Blood Culture - Blood, Arm, Right [[id number]] Collected: 07/19/23 2338    Specimen: Blood from Arm, Right Updated: 07/19/23 2347    Blood Culture - Blood, Arm, Left [[id number]] Collected: 07/19/23 2344    Specimen: Blood from Arm, Left Updated: 07/19/23 2346    Hemoglobin A1c [[id number]]  (Abnormal) Collected: 07/20/23 0144    Specimen: Blood Updated: 07/20/23 0218     Hemoglobin A1C 6.00 %      Narrative:      Hemoglobin A1C Ranges:    Increased Risk for Diabetes  5.7% to 6.4%  Diabetes                     >= 6.5%  Diabetic Goal                < 7.0%    CBC Auto Differential [[id number]]  (Abnormal) Collected: 07/20/23 0144    Specimen: Blood Updated: 07/20/23 0202     WBC 12.73 10*3/mm3      RBC 4.23 10*6/mm3      Hemoglobin 11.5 g/dL      Hematocrit 35.7 %      MCV 84.4 fL      MCH 27.2 pg      MCHC 32.2 g/dL      RDW 13.4 %      RDW-SD 41.3 fl      MPV 8.9 fL      Platelets 395 10*3/mm3      Neutrophil % 55.3 %      Lymphocyte % 30.8 %      Monocyte % 9.8 %      Eosinophil % 1.4 %      Basophil % 0.5 %      Immature Grans % 2.2 %      Neutrophils, Absolute 7.03 10*3/mm3      Lymphocytes, Absolute 3.92 10*3/mm3      Monocytes, Absolute 1.25 10*3/mm3      Eosinophils, Absolute 0.18 10*3/mm3      Basophils, Absolute 0.07 10*3/mm3      Immature Grans, Absolute 0.28 10*3/mm3      nRBC 0.0 /100 WBC     Comprehensive Metabolic Panel [[id number]]  (Abnormal) Collected: 07/20/23 0144    Specimen: Blood Updated: 07/20/23 0232     Glucose 124 mg/dL      BUN 21 mg/dL      Creatinine 5.15 mg/dL      Sodium 133 mmol/L      Potassium 3.1 mmol/L      Chloride 98 mmol/L      CO2 19.8 mmol/L      Calcium 9.1 mg/dL      Total Protein 6.9 g/dL      Albumin 3.5 g/dL      ALT (SGPT) 32 U/L      AST (SGOT) 21 U/L      Alkaline Phosphatase 70 U/L      Total Bilirubin 0.5 mg/dL      Globulin 3.4 gm/dL      A/G Ratio 1.0 g/dL      BUN/Creatinine Ratio 4.1     Anion Gap 15.2 mmol/L      eGFR 9.6 mL/min/1.73      Comment: <15 Indicative of kidney failure       Narrative:      GFR Normal >60  Chronic Kidney Disease <60  Kidney Failure <15      Gastrointestinal Panel, PCR - Stool, Per Rectum [[id number]]  (Abnormal) Collected: 07/20/23 0208    Specimen: Stool from Per Rectum Updated: 07/20/23 0345     Campylobacter Not Detected     Plesiomonas shigelloides Not Detected     Salmonella Detected     Vibrio Not Detected     Vibrio cholerae  Not Detected     Yersinia enterocolitica Not Detected     Enteroaggregative E. coli (EAEC) Not Detected     Enteropathogenic E. coli (EPEC) Not Detected     Enterotoxigenic E. coli (ETEC) lt/st Not Detected     Shiga-like toxin-producing E. coli (STEC) stx1/stx2 Not Detected     Shigella/Enteroinvasive E. coli (EIEC) Not Detected     Cryptosporidium Not Detected     Cyclospora cayetanensis Not Detected     Entamoeba histolytica Not Detected     Giardia lamblia Not Detected     Adenovirus F40/41 Not Detected     Astrovirus Not Detected     Norovirus GI/GII Not Detected     Rotavirus A Not Detected     Sapovirus (I, II, IV or V) Not Detected    Stool Culture, Targeted - Stool, Per Rectum [[id number]] Collected: 07/20/23 0208    Specimen: Stool from Per Rectum Updated: 07/20/23 0345              CT Abdomen Pelvis Without Contrast   Final Result       No CT evidence of an acute abdominal or pelvic abnormality.       Interval resolution of the right adnexal cyst.       This report was finalized on 7/19/2023 9:57 PM by Pilar Rojas MD.                New Medications Ordered This Visit   Medications    sodium chloride 0.9 % bolus 1,000 mL    potassium chloride (K-DUR,KLOR-CON) CR tablet 40 mEq    sodium chloride 0.9 % flush 10 mL    sodium chloride 0.9 % flush 10 mL    sodium chloride 0.9 % infusion 40 mL    AND Linked Order Group     sennosides-docusate (PERICOLACE) 8.6-50 MG per tablet 2 tablet     polyethylene glycol (MIRALAX) packet 17 g     bisacodyl (DULCOLAX) EC tablet 5 mg     bisacodyl (DULCOLAX) suppository 10 mg    heparin (porcine) 5000 UNIT/ML injection 5,000 Units    sodium chloride 0.9 % infusion    cefTRIAXone (ROCEPHIN) 1,000 mg in sodium chloride 0.9 % 100 mL IVPB-VTB    Magnesium Low Dose Replacement - Follow Nurse / BPA Driven Protocol    levothyroxine (SYNTHROID, LEVOTHROID) tablet 75 mcg    metoprolol succinate XL (TOPROL-XL) 24 hr tablet 50 mg    atorvastatin (LIPITOR) tablet 40 mg    busPIRone  (BUSPAR) tablet 15 mg    doxepin (SINEquan) capsule 10 mg    pantoprazole (PROTONIX) EC tablet 40 mg    azithromycin (ZITHROMAX) tablet 1,000 mg    azithromycin (ZITHROMAX) tablet 500 mg         Procedures            MEDICAL DECISION-MAKING AND ED COURSE:     ED Course as of 07/20/23 0453   Wed Jul 19, 2023 2036 48-year-old female presents in acute renal failure, mild abdominal cramping but no other significant complaints.  Administer 1 L NS IVF [KP]   2036 Creatinine(!): 5.20  1.1 x 2 months ago [KP]   2037 Potassium(!): 3.3  Replete 40 mEq p.o. [KP]   2037 HS Troponin T(!): 29  No symptoms of ACS [KP]   2246 CT Abdomen Pelvis Without Contrast  No CT evidence of an acute abdominal or pelvic abnormality.     Interval resolution of the right adnexal cyst.   [KP]   2309 Case discussed with and patient admitted to hospitalist Dr. Camejo. [KP]   2317 WBC, UA(!): 6-12 [KP]   2317 Bacteria, UA(!): 2+ [KP]   2317 Ceftriaxone ordered by Dr. Camejo. [KP]   u Jul 20, 2023   0009 EKG at 2352 hrs. NSR 93 bpm, , QRS 96, QTc 462, regular axis, poor R wave progression, no significant ST deviation, no STEMI. [KP]      ED Course User Index  [KP] Ambrose Reddy MD       ?      FINAL IMPRESSION:       1. Acute renal failure, unspecified acute renal failure type    2. Hypokalemia    3. Acute UTI         The complaints listed here are new problems to this examiner.       Medication List        ASK your doctor about these medications      lisinopril 40 MG tablet  Commonly known as: PRINIVIL,ZESTRIL  Ask about: Which instructions should I use?              FOLLOW-UP  No follow-up provider specified.    DISPOSITION  ED Disposition       ED Disposition   Decision to Admit    Condition   --    Comment   Level of Care: Med/Surg [1]   Diagnosis: JESÚS (acute kidney injury) [918376]   Admitting Physician: AGATHA CAMEJO [[id number]]   Attending Physician: AGATHA CAMEJO [[id number]]   Certification: I Certify That Inpatient  Hospital Services Are Medically Necessary For Greater Than 2 Midnights                     Please note that portions of this note were completed with a voice recognition program.      Ambrose Reddy MD  04:53 EDT  7/20/2023               Ambrose Reddy MD  07/19/23 9137       Ambrose Reddy MD  07/20/23 0454

## 2023-07-21 LAB
ALBUMIN SERPL-MCNC: 3.2 G/DL (ref 3.5–5.2)
ALBUMIN/GLOB SERPL: 1 G/DL
ALP SERPL-CCNC: 67 U/L (ref 39–117)
ALT SERPL W P-5'-P-CCNC: 32 U/L (ref 1–33)
ANION GAP SERPL CALCULATED.3IONS-SCNC: 10.9 MMOL/L (ref 5–15)
AST SERPL-CCNC: 23 U/L (ref 1–32)
BACTERIA SPEC AEROBE CULT: NO GROWTH
BASOPHILS # BLD AUTO: 0.06 10*3/MM3 (ref 0–0.2)
BASOPHILS NFR BLD AUTO: 0.7 % (ref 0–1.5)
BILIRUB SERPL-MCNC: 0.4 MG/DL (ref 0–1.2)
BUN SERPL-MCNC: 14 MG/DL (ref 6–20)
BUN/CREAT SERPL: 6.8 (ref 7–25)
CALCIUM SPEC-SCNC: 8.7 MG/DL (ref 8.6–10.5)
CHLORIDE SERPL-SCNC: 105 MMOL/L (ref 98–107)
CO2 SERPL-SCNC: 23.1 MMOL/L (ref 22–29)
CREAT SERPL-MCNC: 2.06 MG/DL (ref 0.57–1)
CREAT UR-MCNC: 167 MG/DL
DEPRECATED RDW RBC AUTO: 41.7 FL (ref 37–54)
EGFRCR SERPLBLD CKD-EPI 2021: 28.9 ML/MIN/1.73
EOSINOPHIL # BLD AUTO: 0.2 10*3/MM3 (ref 0–0.4)
EOSINOPHIL NFR BLD AUTO: 2.2 % (ref 0.3–6.2)
ERYTHROCYTE [DISTWIDTH] IN BLOOD BY AUTOMATED COUNT: 13.3 % (ref 12.3–15.4)
GLOBULIN UR ELPH-MCNC: 3.3 GM/DL
GLUCOSE BLDC GLUCOMTR-MCNC: 140 MG/DL (ref 70–130)
GLUCOSE BLDC GLUCOMTR-MCNC: 146 MG/DL (ref 70–130)
GLUCOSE BLDC GLUCOMTR-MCNC: 190 MG/DL (ref 70–130)
GLUCOSE BLDC GLUCOMTR-MCNC: 96 MG/DL (ref 70–130)
GLUCOSE SERPL-MCNC: 123 MG/DL (ref 65–99)
HCT VFR BLD AUTO: 34.4 % (ref 34–46.6)
HGB BLD-MCNC: 11.2 G/DL (ref 12–15.9)
IMM GRANULOCYTES # BLD AUTO: 0.19 10*3/MM3 (ref 0–0.05)
IMM GRANULOCYTES NFR BLD AUTO: 2.1 % (ref 0–0.5)
LYMPHOCYTES # BLD AUTO: 3.03 10*3/MM3 (ref 0.7–3.1)
LYMPHOCYTES NFR BLD AUTO: 33.8 % (ref 19.6–45.3)
MCH RBC QN AUTO: 27.7 PG (ref 26.6–33)
MCHC RBC AUTO-ENTMCNC: 32.6 G/DL (ref 31.5–35.7)
MCV RBC AUTO: 84.9 FL (ref 79–97)
MONOCYTES # BLD AUTO: 0.91 10*3/MM3 (ref 0.1–0.9)
MONOCYTES NFR BLD AUTO: 10.1 % (ref 5–12)
NEUTROPHILS NFR BLD AUTO: 4.58 10*3/MM3 (ref 1.7–7)
NEUTROPHILS NFR BLD AUTO: 51.1 % (ref 42.7–76)
NRBC BLD AUTO-RTO: 0 /100 WBC (ref 0–0.2)
PLATELET # BLD AUTO: 399 10*3/MM3 (ref 140–450)
PMV BLD AUTO: 8.8 FL (ref 6–12)
POTASSIUM SERPL-SCNC: 3.2 MMOL/L (ref 3.5–5.2)
PROT ?TM UR-MCNC: 16.2 MG/DL
PROT SERPL-MCNC: 6.5 G/DL (ref 6–8.5)
PROT/CREAT UR: 97 MG/G CREA (ref 0–200)
RBC # BLD AUTO: 4.05 10*6/MM3 (ref 3.77–5.28)
SODIUM SERPL-SCNC: 139 MMOL/L (ref 136–145)
WBC NRBC COR # BLD: 8.97 10*3/MM3 (ref 3.4–10.8)

## 2023-07-21 PROCEDURE — 25010000002 CEFTRIAXONE PER 250 MG: Performed by: NURSE PRACTITIONER

## 2023-07-21 PROCEDURE — 99232 SBSQ HOSP IP/OBS MODERATE 35: CPT

## 2023-07-21 PROCEDURE — 85025 COMPLETE CBC W/AUTO DIFF WBC: CPT | Performed by: INTERNAL MEDICINE

## 2023-07-21 PROCEDURE — 25010000002 HEPARIN (PORCINE) PER 1000 UNITS: Performed by: HOSPITALIST

## 2023-07-21 PROCEDURE — 82948 REAGENT STRIP/BLOOD GLUCOSE: CPT

## 2023-07-21 PROCEDURE — 99232 SBSQ HOSP IP/OBS MODERATE 35: CPT | Performed by: INTERNAL MEDICINE

## 2023-07-21 PROCEDURE — 80053 COMPREHEN METABOLIC PANEL: CPT | Performed by: INTERNAL MEDICINE

## 2023-07-21 RX ORDER — SODIUM CHLORIDE 450 MG/100ML
75 INJECTION, SOLUTION INTRAVENOUS CONTINUOUS
Status: DISCONTINUED | OUTPATIENT
Start: 2023-07-21 | End: 2023-07-22

## 2023-07-21 RX ADMIN — HEPARIN SODIUM 5000 UNITS: 5000 INJECTION INTRAVENOUS; SUBCUTANEOUS at 09:26

## 2023-07-21 RX ADMIN — CEFTRIAXONE 2000 MG: 2 INJECTION, POWDER, FOR SOLUTION INTRAMUSCULAR; INTRAVENOUS at 18:35

## 2023-07-21 RX ADMIN — HEPARIN SODIUM 5000 UNITS: 5000 INJECTION INTRAVENOUS; SUBCUTANEOUS at 20:38

## 2023-07-21 RX ADMIN — SODIUM CHLORIDE 75 ML/HR: 4.5 INJECTION, SOLUTION INTRAVENOUS at 13:40

## 2023-07-21 RX ADMIN — PANTOPRAZOLE SODIUM 40 MG: 40 TABLET, DELAYED RELEASE ORAL at 05:23

## 2023-07-21 RX ADMIN — DOCUSATE SODIUM 50 MG AND SENNOSIDES 8.6 MG 2 TABLET: 8.6; 5 TABLET, FILM COATED ORAL at 20:38

## 2023-07-21 RX ADMIN — DOXEPIN HYDROCHLORIDE 10 MG: 10 CAPSULE ORAL at 20:38

## 2023-07-21 RX ADMIN — ATORVASTATIN CALCIUM 40 MG: 40 TABLET, FILM COATED ORAL at 09:26

## 2023-07-21 RX ADMIN — Medication 10 ML: at 20:39

## 2023-07-21 RX ADMIN — SODIUM BICARBONATE 75 MEQ: 84 INJECTION INTRAVENOUS at 07:00

## 2023-07-21 RX ADMIN — METOPROLOL SUCCINATE 50 MG: 50 TABLET, EXTENDED RELEASE ORAL at 09:26

## 2023-07-21 RX ADMIN — LEVOTHYROXINE SODIUM 75 MCG: 0.07 TABLET ORAL at 09:26

## 2023-07-21 NOTE — PLAN OF CARE
Goal Outcome Evaluation:  Plan of Care Reviewed With: patient        Progress: improving  Outcome Evaluation: Patient resting in bed at this time. Patient complained that she felt like she was getting too much fluids. Dr. Rosales notified. Sodium bicarb @ 100ML/hr discontinued and half normal saline started at 75ML/hr per telephone order. No acute distress noted. VSS. Will continue with POC.

## 2023-07-21 NOTE — PLAN OF CARE
Goal Outcome Evaluation:  Plan of Care Reviewed With: patient        Progress: no change          Pt has rested in bed during majority of shift. Pt ambulates well and was able to bathe independently. VSS. No complaints at this time. No acute changes noted. Will continue with plan of care.

## 2023-07-21 NOTE — CONSULTS
Nephrology Consult Note    Referring Provider: Dr. Mitchell  Reason for Consultation: Elevated creatinine    Subjective       History of present illness:  Arabella Rose is a 48 y.o. female who presented to [facility] emergency department with chief complaint of not feeling well, generalized weakness fatigability nausea vomiting and diarrhea.  Patient has had a history of longstanding diabetes mellitus, essential hypertension and anxiety and depression.  Patient has a history of JESÚS in May this year with creatinine peaked at 3.6 and improved to 1.1.  It was attributed as prerenal.  Patient previous baseline creatinine has been around 0.7.  Patient stated she has been having diarrhea along with nausea and vomiting for past couple of weeks and her oral intake has been very poor.  Denied chronic NSAIDS use. Patient denies hematuria, dysuria, difficulty passing urine. No prior history of renal stones. No family history of renal disease    History  Past Medical History:   Diagnosis Date    Anxiety     Depression     Diabetes mellitus     High cholesterol     History of pulmonary embolus (PE)     Hypertension     Hypothyroidism     Sleep apnea    ,   Past Surgical History:   Procedure Laterality Date    CHOLECYSTECTOMY      EYE SURGERY      PULMONARY EMBOLISM SURGERY     ,   Family History   Problem Relation Age of Onset    Stroke Mother     Diabetes Father     Heart disease Father     Hypertension Father    ,   Social History     Tobacco Use    Smoking status: Never    Smokeless tobacco: Never   Vaping Use    Vaping Use: Never used   Substance Use Topics    Alcohol use: No    Drug use: No   ,   Medications Prior to Admission   Medication Sig Dispense Refill Last Dose    atorvastatin (LIPITOR) 40 MG tablet Take 1 tablet by mouth Daily.   7/19/2023 at 0830    busPIRone (BUSPAR) 15 MG tablet Take 1 tablet by mouth 3 (Three) Times a Day As Needed (anxiety).   7/19/2023 at 0830    levothyroxine (SYNTHROID,  LEVOTHROID) 75 MCG tablet Take 1 tablet by mouth Daily.   7/19/2023 at 0830    lisinopril (PRINIVIL,ZESTRIL) 40 MG tablet Take 1 tablet by mouth Daily.   7/19/2023 at 0830    metFORMIN (GLUCOPHAGE) 500 MG tablet Take 1 tablet by mouth Every 12 (Twelve) Hours.   7/19/2023 at 0830    metoprolol succinate XL (TOPROL-XL) 50 MG 24 hr tablet Take 1 tablet by mouth Daily.   7/19/2023 at 0830    omeprazole (priLOSEC) 20 MG capsule Take 1 capsule by mouth Daily.   7/19/2023 at 0830    doxepin (SINEquan) 10 MG capsule Take 1 capsule by mouth Every Night.   7/18/2023    pramipexole (MIRAPEX) 0.25 MG tablet Take 1 tablet by mouth Every Night.   7/18/2023   , Scheduled Meds:  atorvastatin, 40 mg, Oral, Daily  cefTRIAXone, 2,000 mg, Intravenous, Q24H  doxepin, 10 mg, Oral, Nightly  heparin (porcine), 5,000 Units, Subcutaneous, Q12H  levothyroxine, 75 mcg, Oral, Daily  metoprolol succinate XL, 50 mg, Oral, Daily  pantoprazole, 40 mg, Oral, Q AM  senna-docusate sodium, 2 tablet, Oral, BID  sodium chloride, 10 mL, Intravenous, Q12H    , Continuous Infusions:  sodium bicarbonate drip less than/equal to 75 mEq/bag, 75 mEq, Last Rate: 75 mEq (07/20/23 1102)    , PRN Meds:    senna-docusate sodium **AND** polyethylene glycol **AND** bisacodyl **AND** bisacodyl    busPIRone    Magnesium Low Dose Replacement - Follow Nurse / BPA Driven Protocol    sodium chloride    sodium chloride and Allergies:  Codeine and Prozac [fluoxetine hcl]    Review of Systems  More than 10 point review of systems was done. Pertinent items are noted in HPI, all other systems reviewed and negative    Objective     Vital Signs  Temp:  [97 °F (36.1 °C)-97.7 °F (36.5 °C)] 97.5 °F (36.4 °C)  Heart Rate:  [83-97] 83  Resp:  [20] 20  BP: (105-123)/(70-77) 105/70    Intake/Output                         07/19/23 0701 - 07/20/23 0700 07/20/23 0701 - 07/21/23 0700     [id number] 5409-4036 Total 2931-8876 7031-4819 Total                 Intake    P.O.  --  -- --  460  --  460    I.V.  --  474.7 474.7  1006.6  -- 1006.6    Total Intake -- 474.7 474.7 1466.6 -- 1466.6       Output    Total Output -- -- -- -- -- --             Physical Examination:  General Appearance: not in acute distress  No JVD  Lungs: Clear to auscultation, respirations regular and unlabored  Heart: Regular rhythm & normal rate, normal S1, S2, no murmur, no gallop, no rub   Abdomen: Normal bowel sounds, no masses and soft non-tender  Extremities: No edema  Neurologic: Orientated to person, place, time, grossly no focal deficitis    Laboratory Data :      WBC WBC   Date Value Ref Range Status   07/20/2023 12.73 (H) 3.40 - 10.80 10*3/mm3 Final   07/19/2023 12.93 (H) 3.40 - 10.80 10*3/mm3 Final      HGB Hemoglobin   Date Value Ref Range Status   07/20/2023 11.5 (L) 12.0 - 15.9 g/dL Final   07/19/2023 13.1 12.0 - 15.9 g/dL Final      HCT Hematocrit   Date Value Ref Range Status   07/20/2023 35.7 34.0 - 46.6 % Final   07/19/2023 39.3 34.0 - 46.6 % Final      Platlets No results found for: LABPLAT   MCV MCV   Date Value Ref Range Status   07/20/2023 84.4 79.0 - 97.0 fL Final   07/19/2023 83.4 79.0 - 97.0 fL Final          Sodium Sodium   Date Value Ref Range Status   07/20/2023 133 (L) 136 - 145 mmol/L Final   07/19/2023 136 136 - 145 mmol/L Final      Potassium Potassium   Date Value Ref Range Status   07/20/2023 3.1 (L) 3.5 - 5.2 mmol/L Final   07/19/2023 3.3 (L) 3.5 - 5.2 mmol/L Final      Chloride Chloride   Date Value Ref Range Status   07/20/2023 98 98 - 107 mmol/L Final   07/19/2023 97 (L) 98 - 107 mmol/L Final      CO2 CO2   Date Value Ref Range Status   07/20/2023 19.8 (L) 22.0 - 29.0 mmol/L Final   07/19/2023 22.3 22.0 - 29.0 mmol/L Final      BUN BUN   Date Value Ref Range Status   07/20/2023 21 (H) 6 - 20 mg/dL Final   07/19/2023 21 (H) 6 - 20 mg/dL Final      Creatinine Creatinine   Date Value Ref Range Status   07/20/2023 5.15 (H) 0.57 - 1.00 mg/dL Final   07/19/2023 5.20 (H) 0.57 - 1.00 mg/dL Final       Calcium Calcium   Date Value Ref Range Status   07/20/2023 9.1 8.6 - 10.5 mg/dL Final   07/19/2023 9.9 8.6 - 10.5 mg/dL Final      PO4 No results found for: CAPO4   Albumin Albumin   Date Value Ref Range Status   07/20/2023 3.5 3.5 - 5.2 g/dL Final   07/19/2023 4.1 3.5 - 5.2 g/dL Final      Magnesium Magnesium   Date Value Ref Range Status   07/19/2023 1.9 1.6 - 2.6 mg/dL Final      Uric Acid No results found for: URICACID     Radiology results :     Imaging Results (Last 72 Hours)       Procedure Component Value Units Date/Time    CT Abdomen Pelvis Without Contrast [[id number]] Collected: 07/19/23 2155     Updated: 07/19/23 2159    Narrative:            Procedure: CT abdomen pelvis without intravenous contrast.  Sagittal and  coronal reformations are supplied.     Comparison: CT abdomen pelvis 5/17/2023.     Findings: Lung bases are normal.     Evaluation of solid organs is limited without the use of intravenous  contrast.  Allowing for this, fatty infiltration of the liver noted.   Surgical absence of the gallbladder present.  Noncontrast enhanced  pancreas, spleen, adrenals and kidneys are morphologically unremarkable.   No obstructing uropathy.  Appendix is normal in the right lower  quadrant.  CT appearance of the uterus and adnexa within normal limits.   Urinary bladder partly distended and morphologically unremarkable.  Mild  diverticulosis of the sigmoid colon.  No inflammatory change.  Aorta and  IVC have a normal appearance.     No ascites, adenopathy or free fluid in the pelvis.     In bone windows, moderate to advanced facet hypertrophic changes at  L3-L4 through L5-S1.       Impression:         No CT evidence of an acute abdominal or pelvic abnormality.     Interval resolution of the right adnexal cyst.     This report was finalized on 7/19/2023 9:57 PM by Pilar Rojas MD.                 Medications:      atorvastatin, 40 mg, Oral, Daily  cefTRIAXone, 2,000 mg, Intravenous, Q24H  doxepin, 10 mg,  Oral, Nightly  heparin (porcine), 5,000 Units, Subcutaneous, Q12H  levothyroxine, 75 mcg, Oral, Daily  metoprolol succinate XL, 50 mg, Oral, Daily  pantoprazole, 40 mg, Oral, Q AM  senna-docusate sodium, 2 tablet, Oral, BID  sodium chloride, 10 mL, Intravenous, Q12H      sodium bicarbonate drip less than/equal to 75 mEq/bag, 75 mEq, Last Rate: 75 mEq (07/20/23 1102)        Assessment & Plan       JESÚS (acute kidney injury)      -JESÚS, nonoliguric  - Anion gap metabolic acidosis with concomitant normal anion gap metabolic acidosis  - Type 2 diabetes mellitus  - Essential hypertension  - GERD    JESÚS most likely prerenal azotemia.  Baseline creatinine less than 1 admitted with 5.2.  No hematuria no obstruction on CT abdomen  100 mg/dL proteinuria  No thrombocytopenia and no evidence of hemolysis so less likely HUS/TTP    Most likely patient has sensitive GFR in setting of diabetic kidney disease  - DC normal saline  - Urine protein creatinine ratio  - Start on a balanced bicarb fluid with half-normal saline +75 mEq of sodium bicarb at 100 cc/h  Thanks Dr Mitchell for the consult. Nephrology will follow the patient.   I discussed the patient's findings and my recommendations with patient    Yovana Rosales MD  07/20/23  20:17 EDT

## 2023-07-21 NOTE — PROGRESS NOTES
Patient Identification:  Name:  Arabella Rose  Age:  48 y.o.  Sex:  female  :  1975  MRN:  [id number]  Visit Number:  [id number]  Primary Care Provider:  Benigno Verduzco PA-C    Length of stay:  2    Subjective/Interval History/Consultants/Procedures     Chief Complaint:   Chief Complaint   Patient presents with    Urinary Retention       Subjective/Interval History:    48 y.o. female who was admitted on 2023 with salmonella gastroenteritis, JESÚS      PMH is significant for  T2DM, HTN, HLD, Hypothyroidism, anxiety/depression, GERD   For complete admission information, please see history and physical.     Consultations:  Infectious disease  Nephrology     Procedures/Scans:  CT abdomen and pelvis w/o contrast     Today, the patient was seen resting quietly in no acute distress. She states overall feeling some better today. Maintains good UOP per her report. Abdominal pain and diarrhea continue to improve. She is requesting a regular diet.      Room location at the time of evaluation was [facility].    ----------------------------------------------------------------------------------------------------------------------  Current Hospital Meds:  atorvastatin, 40 mg, Oral, Daily  cefTRIAXone, 2,000 mg, Intravenous, Q24H  doxepin, 10 mg, Oral, Nightly  heparin (porcine), 5,000 Units, Subcutaneous, Q12H  levothyroxine, 75 mcg, Oral, Daily  metoprolol succinate XL, 50 mg, Oral, Daily  pantoprazole, 40 mg, Oral, Q AM  senna-docusate sodium, 2 tablet, Oral, BID  sodium chloride, 10 mL, Intravenous, Q12H      sodium bicarbonate drip less than/equal to 75 mEq/bag, 75 mEq, Last Rate: 75 mEq (23 0700)      ----------------------------------------------------------------------------------------------------------------------      Objective     Vital Signs:  Temp:  [97.7 °F (36.5 °C)-98.6 °F (37 °C)] 98.6 °F (37 °C)  Heart Rate:  [79-88] 85  Resp:  [18] 18  BP: (103-143)/(65-78) 121/70      23  3165  07/20/23  0032 07/20/23  0500   Weight: 125 kg (275 lb) 121 kg (267 lb) 121 kg (267 lb)     Body mass index is 45.81 kg/m².    Intake/Output Summary (Last 24 hours) at 7/21/2023 1050  Last data filed at 7/21/2023 0909  Gross per 24 hour   Intake 1506.62 ml   Output 1800 ml   Net -293.38 ml     I/O this shift:  In: -   Out: 800 [Urine:800]  Diet: Liquid Diets; Clear Liquid; Texture: Regular Texture (IDDSI 7); Fluid Consistency: Thin (IDDSI 0)  ----------------------------------------------------------------------------------------------------------------------    Physical Exam  Vitals and nursing note reviewed.   Constitutional:       Appearance: She is not ill-appearing.   HENT:      Head: Normocephalic and atraumatic.   Eyes:      Extraocular Movements: Extraocular movements intact.      Conjunctiva/sclera: Conjunctivae normal.   Cardiovascular:      Rate and Rhythm: Normal rate and regular rhythm.   Pulmonary:      Effort: Pulmonary effort is normal.      Breath sounds: Normal breath sounds.   Abdominal:      Palpations: Abdomen is soft.   Musculoskeletal:      Right lower leg: No edema.      Left lower leg: No edema.   Skin:     General: Skin is warm and dry.   Neurological:      Mental Status: She is alert. Mental status is at baseline.   Psychiatric:         Mood and Affect: Mood normal.         Behavior: Behavior normal.              ----------------------------------------------------------------------------------------------------------------------  Tele:      ----------------------------------------------------------------------------------------------------------------------  Results from last 7 days   Lab Units 07/19/23 2025 07/19/23  1803   CK TOTAL U/L 193*  --    HSTROP T ng/L 27* 29*   PROBNP pg/mL  --  83.4     Results from last 7 days   Lab Units 07/21/23  0823 07/20/23  0144 07/19/23  2338 07/19/23  1803   CRP mg/dL  --   --   --  1.48*   LACTATE mmol/L  --   --  1.0  --    WBC 10*3/mm3 8.97  12.73*  --  12.93*   HEMOGLOBIN g/dL 11.2* 11.5*  --  13.1   HEMATOCRIT % 34.4 35.7  --  39.3   MCV fL 84.9 84.4  --  83.4   MCHC g/dL 32.6 32.2  --  33.3   PLATELETS 10*3/mm3 399 395  --  496*         Results from last 7 days   Lab Units 07/21/23  0823 07/20/23  0144 07/19/23  1803   SODIUM mmol/L 139 133* 136   POTASSIUM mmol/L 3.2* 3.1* 3.3*   MAGNESIUM mg/dL  --   --  1.9   CHLORIDE mmol/L 105 98 97*   CO2 mmol/L 23.1 19.8* 22.3   BUN mg/dL 14 21* 21*   CREATININE mg/dL 2.06* 5.15* 5.20*   CALCIUM mg/dL 8.7 9.1 9.9   GLUCOSE mg/dL 123* 124* 130*   ALBUMIN g/dL 3.2* 3.5 4.1   BILIRUBIN mg/dL 0.4 0.5 0.6   ALK PHOS U/L 67 70 79   AST (SGOT) U/L 23 21 24   ALT (SGPT) U/L 32 32 35*   Estimated Creatinine Clearance: 41.9 mL/min (A) (by C-G formula based on SCr of 2.06 mg/dL (H)).  No results found for: AMMONIA      Blood Culture   Date Value Ref Range Status   07/19/2023 No growth at 24 hours  Preliminary   07/19/2023 No growth at 24 hours  Preliminary     Urine Culture   Date Value Ref Range Status   07/19/2023 No growth  Final     No results found for: WOUNDCX  No results found for: STOOLCX  ----------------------------------------------------------------------------------------------------------------------  Imaging Results (Last 24 Hours)       ** No results found for the last 24 hours. **          ----------------------------------------------------------------------------------------------------------------------   I have reviewed the above laboratory values for 07/21/23    Assessment/Plan     Active Hospital Problems    Diagnosis  POA    **JESÚS (acute kidney injury) [N17.9]  Yes         ASSESSMENT/PLAN:    Sepsis, POA, now resolved, 2/2 salmonella gastroenteritis   JESÚS, POA,  likely prerenal 2/2 above  Hypokalemia, POA, likely 2/2 #1  Patient with 2-week history of N/V/D which began [place] and [place].  She was seen in an ED in [place] 6 days ago and treated for JESÚS with  fluid replacement.  Presents with creatinine 5.2.  Hypokalemic.  Meeting sepsis criteria on arrival with tachycardia, leukocytosis and GI PCR positive for Salmonella.  UA on arrival with trace leukocytes and 6-12 WBCs with 2+ bacteria however notably poor sample with 7-12 squamous epis appreciated.  She was started on clear liquid diet, Rocephin and azithromycin  Nephrology consulted given limited improvement in creatinine 5.2-5.15 following fluid replacement in the ED. Initiated balanced bicarb with half normal saline at 100 mL/hr. Creatinine improved overnight now 2.06 from >5  Infectious disease consulted for further assistance with antimicrobial therapy, assistance appreciated. Have initiated monotherapy with rocephin 2g daily. Recommend can be deescalated to cefdinir at discharge. Blood cultures remain negative to date.  Hypokalemia stable 3.2 this AM, may replace as kidney function continues to improve.   VS remain stable.   Continue supportive care and plan to advance diet today.   Avoid nephrotoxins as able and monitor I's and O's     Chronic:  T2DM  HTN  HLD  Hypothyroidism  Anxiety/depression  GERD  Continuing home meds as indicated per med rec holding nephrotoxins as appropriate  Home metformin held on admission.  Glucose trend currently around 100.  We will continue to monitor, can plan to initiate SSI if tighter glycemic control is needed.  A1c this admission 6.0 indicating well-controlled diabetes.      -----------  -DVT prophylaxis: subcu heparin  -Disposition plans/anticipated needs: Plan for home following clinical improvement. Hopefully within the next 48 hours        The patient is high risk due to the following diagnoses/reasons:  salmonella gastroenteritis, JESÚS        Solitario Jean PA-C  07/21/23  10:50 EDT

## 2023-07-21 NOTE — PROGRESS NOTES
PROGRESS NOTE         Patient Identification:  Name:  Arabella Rose  Age:  48 y.o.  Sex:  female  :  1975  MRN:  [id number]  Visit Number:  [id number]  Primary Care Provider:  Benigno Verduzco PA-C         LOS: 2 days       ----------------------------------------------------------------------------------------------------------------------  Subjective       Chief Complaints:    Urinary Retention        Interval History:      Patient sitting up in bed this morning.  Overall feels much better today.  Anxious to be advanced from liquid diet and eat more solid foods.  Reports overall improvement in diarrhea.  Denies nausea or vomiting.  Lungs clear to auscultation bilaterally.  Abdomen soft, nontender.  Afebrile.  WBC improved at 8.97.    Review of Systems:    Constitutional: no fever, chills and night sweats.  Generalized fatigue.  Eyes: no eye drainage, itching or redness.  HEENT: no mouth sores, dysphagia or nose bleed.  Respiratory: no for shortness of breath, cough or production of sputum.  Cardiovascular: no chest pain, no palpitations, no orthopnea.  Gastrointestinal: no nausea, vomiting. Positive diarrhea, improving. No abdominal pain, hematemesis or rectal bleeding.  Genitourinary: no dysuria or polyuria.  Hematologic/lymphatic: no lymph node abnormalities, no easy bruising or easy bleeding.  Musculoskeletal: no muscle or joint pain.  Skin: No rash and no itching.  Neurological: no loss of consciousness, no seizure, no headache.  Behavioral/Psych: no depression or suicidal ideation.  Endocrine: no hot flashes.  Immunologic: negative.    ----------------------------------------------------------------------------------------------------------------------      Objective       [facility] Meds:  atorvastatin, 40 mg, Oral, Daily  cefTRIAXone, 2,000 mg, Intravenous, Q24H  doxepin, 10 mg, Oral, Nightly  heparin (porcine), 5,000 Units, Subcutaneous, Q12H  levothyroxine, 75 mcg, Oral,  Daily  metoprolol succinate XL, 50 mg, Oral, Daily  pantoprazole, 40 mg, Oral, Q AM  senna-docusate sodium, 2 tablet, Oral, BID  sodium chloride, 10 mL, Intravenous, Q12H      sodium bicarbonate drip less than/equal to 75 mEq/bag, 75 mEq, Last Rate: 75 mEq (07/21/23 0700)      ----------------------------------------------------------------------------------------------------------------------    Vital Signs:  Temp:  [97.7 °F (36.5 °C)-98.6 °F (37 °C)] 98.6 °F (37 °C)  Heart Rate:  [79-88] 85  Resp:  [18] 18  BP: (103-143)/(65-78) 121/70  No data found.  SpO2 Percentage    07/20/23 1800 07/21/23 0300 07/21/23 0713   SpO2: 96% 98% 99%     SpO2:  [96 %-99 %] 99 %  on   ;   Device (Oxygen Therapy): room air    Body mass index is 45.81 kg/m².  Wt Readings from Last 3 Encounters:   07/20/23 121 kg (267 lb)   05/19/23 125 kg (275 lb 14.4 oz)   02/12/23 124 kg (273 lb)        Intake/Output Summary (Last 24 hours) at 7/21/2023 0955  Last data filed at 7/21/2023 0909  Gross per 24 hour   Intake 1506.62 ml   Output 1800 ml   Net -293.38 ml     Diet: Liquid Diets; Clear Liquid; Texture: Regular Texture (IDDSI 7); Fluid Consistency: Thin (IDDSI 0)  ----------------------------------------------------------------------------------------------------------------------      Physical Exam:    Constitutional:  Well-developed and well-nourished.  No respiratory distress.  On room air.     HENT:  Head: Normocephalic and atraumatic.  Mouth:  Moist mucous membranes.    Eyes:  Conjunctivae and EOM are normal.  No scleral icterus.  Neck:  Neck supple.  No JVD present.    Cardiovascular:  Normal rate, regular rhythm and normal heart sounds with no murmur. No edema.  Pulmonary/Chest:  No respiratory distress, no wheezes, no crackles, with normal breath sounds and good air movement.  Clear to auscultation bilaterally.  Abdominal:  Soft.  Bowel sounds are normal.  No distension and no tenderness.   Musculoskeletal:  No edema, no tenderness,  and no deformity.  No swelling or redness of joints.  Neurological:  Alert and oriented to person, place, and time.  No facial droop.  No slurred speech.   Skin:  Skin is warm and dry.  No rash noted.  No pallor.   Psychiatric:  Normal mood and affect.  Behavior is normal.        ----------------------------------------------------------------------------------------------------------------------  Results from last 7 days   Lab Units 07/19/23 2025 07/19/23  1803   CK TOTAL U/L 193*  --    HSTROP T ng/L 27* 29*     Results from last 7 days   Lab Units 07/19/23  1803   PROBNP pg/mL 83.4         Results from last 7 days   Lab Units 07/21/23  0823 07/20/23 0144 07/19/23  2338 07/19/23  1803   CRP mg/dL  --   --   --  1.48*   LACTATE mmol/L  --   --  1.0  --    WBC 10*3/mm3 8.97 12.73*  --  12.93*   HEMOGLOBIN g/dL 11.2* 11.5*  --  13.1   HEMATOCRIT % 34.4 35.7  --  39.3   MCV fL 84.9 84.4  --  83.4   MCHC g/dL 32.6 32.2  --  33.3   PLATELETS 10*3/mm3 399 395  --  496*     Results from last 7 days   Lab Units 07/21/23  0823 07/20/23  0144 07/19/23  1803   SODIUM mmol/L 139 133* 136   POTASSIUM mmol/L 3.2* 3.1* 3.3*   MAGNESIUM mg/dL  --   --  1.9   CHLORIDE mmol/L 105 98 97*   CO2 mmol/L 23.1 19.8* 22.3   BUN mg/dL 14 21* 21*   CREATININE mg/dL 2.06* 5.15* 5.20*   CALCIUM mg/dL 8.7 9.1 9.9   GLUCOSE mg/dL 123* 124* 130*   ALBUMIN g/dL 3.2* 3.5 4.1   BILIRUBIN mg/dL 0.4 0.5 0.6   ALK PHOS U/L 67 70 79   AST (SGOT) U/L 23 21 24   ALT (SGPT) U/L 32 32 35*   Estimated Creatinine Clearance: 41.9 mL/min (A) (by C-G formula based on SCr of 2.06 mg/dL (H)).  No results found for: AMMONIA    Hemoglobin A1C   Date/Time Value Ref Range Status   07/20/2023 0144 6.00 (H) 4.80 - 5.60 % Final     Glucose   Date/Time Value Ref Range Status   07/21/2023 0717 96 70 - 130 mg/dL Final     Comment:     Meter: [id number] : [id number] CHRISTYKAITLIN DELROY   07/20/2023 2004 150 (H) 70 - 130 mg/dL Final     Comment:     Meter: [id number]  : [id number] LANCE PEOPLES   07/20/2023 1647 114 70 - 130 mg/dL Final     Comment:     Meter: [id number] : [id number] ELAINE BROWN   07/20/2023 1045 142 (H) 70 - 130 mg/dL Final     Comment:     Meter: [id number] : [id number] ELAINE BROWN   07/20/2023 0651 112 70 - 130 mg/dL Final     Comment:     Meter: [id number] : [id number] MARIANA AMEYA     Lab Results   Component Value Date    HGBA1C 6.00 (H) 07/20/2023     Lab Results   Component Value Date    TSH 1.320 07/19/2023    FREET4 1.82 (H) 07/19/2023       Blood Culture   Date Value Ref Range Status   07/19/2023 No growth at 24 hours  Preliminary   07/19/2023 No growth at 24 hours  Preliminary     Urine Culture   Date Value Ref Range Status   07/19/2023 No growth  Final     No results found for: WOUNDCX  No results found for: STOOLCX  No results found for: RESPCX  Pain Management Panel  More data exists         Latest Ref Rng & Units 7/20/2023 5/17/2023   Pain Management Panel   Creatinine, Urine mg/dL 167.0  369.6          ----------------------------------------------------------------------------------------------------------------------  Imaging Results (Last 24 Hours)       ** No results found for the last 24 hours. **            ----------------------------------------------------------------------------------------------------------------------    Pertinent Infectious Disease Results    Presented to [facility] Emergency Department on 7/19/2023 for elevated creatinine.  WBC stable at 12.73.  CRP 1.48.  Lactic acid normal at 1.0.  GI panel PCR detected Salmonella.  Urinalysis from 7/19/2023 slightly positive with 6-12 WBCs, however contaminated specimen with 7-12 squamous cells, culture finalized with no growth COVID-19 and influenza PCR negative.  Blood cultures from 7/19/2023 show no growth thus far. Stool culture currently in progress.  CT of the abdomen and pelvis from 7/19/2023 reports no evidence of acute abdominal or pelvic  abnormality, interval resolution of right adnexal cyst.     Assessment/Plan       Assessment       Salmonella gastroenteritis  UTI         Plan      I saw and examined the patient myself this morning and discussed the findings and plan of care with MARTHA Jones and got report from primary RN and here are my findings:    The patient is currently sitting up in bed and expresses feeling significantly better today. They are eager to progress from a liquid diet to more solid foods. Furthermore, they report an overall improvement in diarrhea and deny experiencing any nausea or vomiting. On auscultation, both lungs are clear bilaterally, and the abdomen is soft and nontender. The patient is afebrile, indicating no fever. Encouragingly, the white blood cell count has improved and is now at 8.97.    For now I recommend treatment with ceftriaxone 2 g IV every 24 hours. This intravenous regimen should be continued until the patient's discharge. After discharge, the patient can be switched to oral cefdinir to be taken through 7/24/2023 to ensure complete treatment of the Salmonella infection.      ANTIMICROBIAL THERAPY    cefTRIAXone (ROCEPHIN) 2000 mg IVPB in 100 mL NS (VTB)     Code Status:   Code Status and Medical Interventions:   Ordered at: 07/19/23 5463     Level Of Support Discussed With:    Patient     Code Status (Patient has no pulse and is not breathing):    CPR (Attempt to Resuscitate)     Medical Interventions (Patient has pulse or is breathing):    Full Support     Release to patient:    Routine Release       MARTHA Jones  07/21/23  09:55 EDT    Electronically signed by MARTHA Jones, 07/21/23, 9:58 AM EDT.    Electronically signed by David Linda MD, 07/21/23, 12:20 PM EDT.

## 2023-07-21 NOTE — PROGRESS NOTES
Nephrology Progress Note      Subjective     Patient denied any chest pain, shortness of breath    Objective       Vital signs :     Temp:  [97.7 °F (36.5 °C)-98.6 °F (37 °C)] 98.6 °F (37 °C)  Heart Rate:  [79-88] 85  Resp:  [18] 18  BP: (103-143)/(65-78) 121/70    Intake/Output                         07/19/23 0701 - 07/20/23 0700 07/20/23 0701 - 07/21/23 0700 0701-1900 1901-0700 Total 7343-9487 7884-0414 Total                 Intake    P.O.  --  -- --  460  240 700    I.V.  --  474.7 474.7  1006.6  -- 1006.6    Total Intake -- 474.7 474.7 1466.6 240 1706.6       Output    Urine  --  -- --  --  1000 1000    Total Output -- -- -- -- 1000 1000             Physical Exam:    General Appearance : alert, pleasant, appears stated age, cooperative and alert  Lungs : clear to auscultation, respirations regular  Heart :  regular rhythm & normal rate, normal S1, S2 and no murmur, no rub  Abdomen : soft, non distended  Extremities : No edema edema,   Neurologic :   orientated to person, place, time and situation, Grossly no focal deficits        Laboratory Data :     Albumin Albumin   Date Value Ref Range Status   07/20/2023 3.5 3.5 - 5.2 g/dL Final   07/19/2023 4.1 3.5 - 5.2 g/dL Final      Magnesium Magnesium   Date Value Ref Range Status   07/19/2023 1.9 1.6 - 2.6 mg/dL Final          PTH               No results found for: PTH    CBC and coagulation:  Results from last 7 days   Lab Units 07/20/23  0144 07/19/23  2338 07/19/23  1803   LACTATE mmol/L  --  1.0  --    SED RATE mm/hr  --   --  17   CRP mg/dL  --   --  1.48*   WBC 10*3/mm3 12.73*  --  12.93*   HEMOGLOBIN g/dL 11.5*  --  13.1   HEMATOCRIT % 35.7  --  39.3   MCV fL 84.4  --  83.4   MCHC g/dL 32.2  --  33.3   PLATELETS 10*3/mm3 395  --  496*     Acid/base balance:      Renal and electrolytes:    Results from last 7 days   Lab Units 07/20/23  0144 07/19/23  1803   SODIUM mmol/L 133* 136   POTASSIUM mmol/L 3.1* 3.3*   MAGNESIUM mg/dL  --  1.9   CHLORIDE mmol/L 98  97*   CO2 mmol/L 19.8* 22.3   BUN mg/dL 21* 21*   CREATININE mg/dL 5.15* 5.20*   CALCIUM mg/dL 9.1 9.9     Estimated Creatinine Clearance: 16.8 mL/min (A) (by C-G formula based on SCr of 5.15 mg/dL (H)).  @GFRCG:3@   Liver and pancreatic function:  Results from last 7 days   Lab Units 07/20/23  0144 07/19/23  1803   ALBUMIN g/dL 3.5 4.1   BILIRUBIN mg/dL 0.5 0.6   ALK PHOS U/L 70 79   AST (SGOT) U/L 21 24   ALT (SGPT) U/L 32 35*         Cardiac:  Results from last 7 days   Lab Units 07/19/23  1803   PROBNP pg/mL 83.4     Liver and pancreatic function:  Results from last 7 days   Lab Units 07/20/23  0144 07/19/23  1803   ALBUMIN g/dL 3.5 4.1   BILIRUBIN mg/dL 0.5 0.6   ALK PHOS U/L 70 79   AST (SGOT) U/L 21 24   ALT (SGPT) U/L 32 35*       Medications :     atorvastatin, 40 mg, Oral, Daily  cefTRIAXone, 2,000 mg, Intravenous, Q24H  doxepin, 10 mg, Oral, Nightly  heparin (porcine), 5,000 Units, Subcutaneous, Q12H  levothyroxine, 75 mcg, Oral, Daily  metoprolol succinate XL, 50 mg, Oral, Daily  pantoprazole, 40 mg, Oral, Q AM  senna-docusate sodium, 2 tablet, Oral, BID  sodium chloride, 10 mL, Intravenous, Q12H      sodium bicarbonate drip less than/equal to 75 mEq/bag, 75 mEq, Last Rate: 75 mEq (07/21/23 0700)          Assessment & Plan     -JESÚS, nonoliguric  - Anion gap metabolic acidosis with concomitant normal anion gap metabolic acidosis  - Type 2 diabetes mellitus  - Essential hypertension  - GERD    We will continue on fluid balanced bicarb.  Follow-up renal functions and decide switching the fluid.  There is no evidence of fluid overload clinically     JESÚS most likely prerenal azotemia.  Baseline creatinine less than 1 admitted with 5.2.  No hematuria no obstruction on CT abdomen  97 mg/g proteinuria  No thrombocytopenia and no evidence of hemolysis so less likely HUS/TTP    -Continue on balanced bicarb fluid      Hafiz Sroya, MD  07/21/23  08:13 EDT    Addendum patient's renal functions have significantly  improved metabolic acidosis has resolved as well.  Bicarb fluid and start on a half-normal saline at 75 cc an hour.  Discussed with RN

## 2023-07-22 VITALS
WEIGHT: 267.8 LBS | HEART RATE: 79 BPM | DIASTOLIC BLOOD PRESSURE: 73 MMHG | BODY MASS INDEX: 45.72 KG/M2 | RESPIRATION RATE: 16 BRPM | TEMPERATURE: 97.9 F | HEIGHT: 64 IN | SYSTOLIC BLOOD PRESSURE: 116 MMHG | OXYGEN SATURATION: 98 %

## 2023-07-22 LAB
ANION GAP SERPL CALCULATED.3IONS-SCNC: 10.8 MMOL/L (ref 5–15)
BUN SERPL-MCNC: 13 MG/DL (ref 6–20)
BUN/CREAT SERPL: 10.1 (ref 7–25)
CALCIUM SPEC-SCNC: 8.6 MG/DL (ref 8.6–10.5)
CHLORIDE SERPL-SCNC: 109 MMOL/L (ref 98–107)
CO2 SERPL-SCNC: 22.2 MMOL/L (ref 22–29)
CREAT SERPL-MCNC: 1.29 MG/DL (ref 0.57–1)
DEPRECATED RDW RBC AUTO: 42.5 FL (ref 37–54)
EGFRCR SERPLBLD CKD-EPI 2021: 50.7 ML/MIN/1.73
ERYTHROCYTE [DISTWIDTH] IN BLOOD BY AUTOMATED COUNT: 13.5 % (ref 12.3–15.4)
GLUCOSE BLDC GLUCOMTR-MCNC: 102 MG/DL (ref 70–130)
GLUCOSE BLDC GLUCOMTR-MCNC: 156 MG/DL (ref 70–130)
GLUCOSE SERPL-MCNC: 120 MG/DL (ref 65–99)
HCT VFR BLD AUTO: 32.8 % (ref 34–46.6)
HGB BLD-MCNC: 10.7 G/DL (ref 12–15.9)
MCH RBC QN AUTO: 28.2 PG (ref 26.6–33)
MCHC RBC AUTO-ENTMCNC: 32.6 G/DL (ref 31.5–35.7)
MCV RBC AUTO: 86.5 FL (ref 79–97)
PLATELET # BLD AUTO: 375 10*3/MM3 (ref 140–450)
PMV BLD AUTO: 9.1 FL (ref 6–12)
POTASSIUM SERPL-SCNC: 3.6 MMOL/L (ref 3.5–5.2)
RBC # BLD AUTO: 3.79 10*6/MM3 (ref 3.77–5.28)
SODIUM SERPL-SCNC: 142 MMOL/L (ref 136–145)
WBC NRBC COR # BLD: 9.65 10*3/MM3 (ref 3.4–10.8)

## 2023-07-22 PROCEDURE — 99239 HOSP IP/OBS DSCHRG MGMT >30: CPT

## 2023-07-22 PROCEDURE — 85027 COMPLETE CBC AUTOMATED: CPT | Performed by: INTERNAL MEDICINE

## 2023-07-22 PROCEDURE — 82948 REAGENT STRIP/BLOOD GLUCOSE: CPT

## 2023-07-22 PROCEDURE — 25010000002 HEPARIN (PORCINE) PER 1000 UNITS: Performed by: HOSPITALIST

## 2023-07-22 PROCEDURE — 80048 BASIC METABOLIC PNL TOTAL CA: CPT | Performed by: INTERNAL MEDICINE

## 2023-07-22 PROCEDURE — 99232 SBSQ HOSP IP/OBS MODERATE 35: CPT | Performed by: NURSE PRACTITIONER

## 2023-07-22 RX ORDER — LISINOPRIL 40 MG/1
20 TABLET ORAL DAILY
Qty: 15 TABLET | Refills: 0
Start: 2023-07-23 | End: 2023-08-22

## 2023-07-22 RX ORDER — CEFDINIR 300 MG/1
300 CAPSULE ORAL 2 TIMES DAILY
Qty: 6 CAPSULE | Refills: 0 | Status: SHIPPED | OUTPATIENT
Start: 2023-07-22 | End: 2023-07-25

## 2023-07-22 RX ADMIN — PANTOPRAZOLE SODIUM 40 MG: 40 TABLET, DELAYED RELEASE ORAL at 05:47

## 2023-07-22 RX ADMIN — LEVOTHYROXINE SODIUM 75 MCG: 0.07 TABLET ORAL at 08:55

## 2023-07-22 RX ADMIN — HEPARIN SODIUM 5000 UNITS: 5000 INJECTION INTRAVENOUS; SUBCUTANEOUS at 08:55

## 2023-07-22 RX ADMIN — SODIUM CHLORIDE 75 ML/HR: 4.5 INJECTION, SOLUTION INTRAVENOUS at 04:42

## 2023-07-22 RX ADMIN — ATORVASTATIN CALCIUM 40 MG: 40 TABLET, FILM COATED ORAL at 08:55

## 2023-07-22 RX ADMIN — METOPROLOL SUCCINATE 50 MG: 50 TABLET, EXTENDED RELEASE ORAL at 08:55

## 2023-07-22 NOTE — DISCHARGE SUMMARY
[facility] HOSPITALIST DISCHARGE SUMMARY    Patient Identification:  Name:  Arabella Rose  Age:  48 y.o.  Sex:  female  :  1975  MRN:  [id number]  Visit Number:  [id number]    Date of Admission: 2023  Date of Discharge:  23     PCP: Benigno Verduzco PA-C    Discharging Provider: Raymundo Jean PA-C / Dr. Vela    Discharge Diagnoses     Discharge Diagnoses:  Sepsis, POA, 2/2 #2, now resolved  Salmonella gastroenteritis, POA  JESÚS, POA, likely prerenal 2/2 #2  Hypokalemia, POA, likely due to GI losses 2/ #2    Secondary Diagnoses:  T2DM  HTN  HLD  Hypothyroidism  Anxiety/depression  GERD    Needs on follow up:  Follow-up PCP, follow-up nephrology in 4 weeks  Consults/Procedures     Consults:   Consults       Date and Time Order Name Status Description    2023  5:24 AM Inpatient Nephrology Consult Completed     2023  3:53 AM Inpatient Infectious Diseases Consult Completed             Procedures/Scans Performed:  CT abdomen and pelvis w/o contrast         History of Presenting Illness     Chief Complaint   Patient presents with    Urinary Retention       Patient is a 48 y.o. female who presented to [facility] complaining of urinary retention.  Please see the admitting history and physical for further details.      Hospital Course     Patient was admitted to [facility] on 2023 following presentation to [facility] ED for further evaluation of 2-week history N/V/[place] consumption [place].  She reported family members had similar symptoms.  She has been seen at an outside ED and treated for an JESÚS with fluids approximately a week prior.  Presented with creatinine at 5.2.  Noted to be hypokalemic.  She met sepsis criteria on arrival with tachycardia and leukocytosis with GI PCR positive for Salmonella.  Did also have UA notable for leukocytes and 6-12 WBCs with 2+ bacteria.  She was started on a clear liquid diet, Rocephin and  azithromycin and admitted for further management.  Nephrology was consulted for assistance as patient's creatinine had only improved minimally following fluid replacement in the emergency department.  Infectious disease was consulted for further assistance with antimicrobial therapy.    Patient's symptoms did improve day over day.  Regular diet was resumed on day 2 of admission as patient was requesting to eat and she tolerated diet well.  Infectious disease saw and evaluated the patient and advised continued monotherapy with Rocephin.  Urine culture did result with no growth.  Infectious disease advised continued antibiotic coverage through 7/24/2023, noted patient could be discharged with cefdinir to complete course.    Nephrology did see in consult as well and felt JESÚS secondary to prerenal azotemia.  Initiated balanced bicarb fluid replacement with half-normal saline at 75 cc/h and creatinine did quickly improve.  Had trended down to 2 by day 3 of admission and 1.29 on the date of discharge.  Nephrology now signed off and would like to see the patient back in clinic in 4 weeks.    Potassium was replaced x1 in the ED.  Continued to monitor on serial labs though avoided further replacement with acute renal failure to prevent hyperkalemia.  Had improved to WNL at 3.6 by the date of discharge.    Patient was seen and examined this AM reporting overall felt better.  No further nausea or vomiting.  Diarrhea continues to resolve.  As patient's creatinine has continued to trend down and no further work-up planned case was discussed with attending physician and agree patient is stable for discharge home on this date.  She will continue course of cefdinir x3 days to complete coverage for Salmonella gastroenteritis.  She will be scheduled to see her PCP as well as follow-up with nephrology in 4 weeks.  Patient's lisinopril was held on admission and BP trend has remained around 120/70.  We will resume at half dose on  discharge.  Recommend continued monitoring and follow-up with PCP for continued titration of antihypertensive regimen.  The above discharge plan and medication changes were discussed with the patient and she expressed agreement and understanding.      Discharge Vitals/Physical Examination     Vital Signs:  Temp:  [97.7 °F (36.5 °C)-98.5 °F (36.9 °C)] 97.9 °F (36.6 °C)  Heart Rate:  [75-99] 79  Resp:  [16-18] 16  BP: ()/(62-73) 116/73  Mean Arterial Pressure (Non-Invasive) for the past 24 hrs (Last 3 readings):   Noninvasive MAP (mmHg)   07/22/23 0650 89     SpO2 Percentage    07/21/23 1851 07/22/23 0300 07/22/23 0650   SpO2: 96% 98% 98%     SpO2:  [96 %-98 %] 98 %  on   ;   Device (Oxygen Therapy): room air    Body mass index is 45.94 kg/m².  Wt Readings from Last 3 Encounters:   07/22/23 121 kg (267 lb 12.8 oz)   05/19/23 125 kg (275 lb 14.4 oz)   02/12/23 124 kg (273 lb)         Physical Exam:  Physical Exam  Vitals and nursing note reviewed.   Constitutional:       General: She is not in acute distress.  HENT:      Head: Normocephalic and atraumatic.   Eyes:      Extraocular Movements: Extraocular movements intact.      Conjunctiva/sclera: Conjunctivae normal.   Cardiovascular:      Rate and Rhythm: Normal rate and regular rhythm.   Pulmonary:      Effort: Pulmonary effort is normal.      Breath sounds: Normal breath sounds.   Abdominal:      Palpations: Abdomen is soft.      Tenderness: There is no abdominal tenderness.   Musculoskeletal:      Right lower leg: No edema.      Left lower leg: No edema.   Skin:     General: Skin is warm and dry.   Neurological:      Mental Status: She is alert. Mental status is at baseline.   Psychiatric:         Mood and Affect: Mood normal.         Behavior: Behavior normal.       Pertinent Laboratory/Radiology Results     Pertinent Laboratory Results:  Results from last 7 days   Lab Units 07/19/23 2025 07/19/23  1803   CK TOTAL U/L 193*  --    HSTROP T ng/L 27* 29*      Results from last 7 days   Lab Units 07/19/23  1803   PROBNP pg/mL 83.4         Results from last 7 days   Lab Units 07/22/23 0325 07/21/23  0823 07/20/23  0144 07/19/23  2338 07/19/23  1803   CRP mg/dL  --   --   --   --  1.48*   LACTATE mmol/L  --   --   --  1.0  --    WBC 10*3/mm3 9.65 8.97 12.73*  --  12.93*   HEMOGLOBIN g/dL 10.7* 11.2* 11.5*  --  13.1   HEMATOCRIT % 32.8* 34.4 35.7  --  39.3   MCV fL 86.5 84.9 84.4  --  83.4   MCHC g/dL 32.6 32.6 32.2  --  33.3   PLATELETS 10*3/mm3 375 399 395  --  496*     Results from last 7 days   Lab Units 07/22/23 0325 07/21/23 0823 07/20/23 0144 07/19/23  1803   SODIUM mmol/L 142 139 133* 136   POTASSIUM mmol/L 3.6 3.2* 3.1* 3.3*   MAGNESIUM mg/dL  --   --   --  1.9   CHLORIDE mmol/L 109* 105 98 97*   CO2 mmol/L 22.2 23.1 19.8* 22.3   BUN mg/dL 13 14 21* 21*   CREATININE mg/dL 1.29* 2.06* 5.15* 5.20*   CALCIUM mg/dL 8.6 8.7 9.1 9.9   GLUCOSE mg/dL 120* 123* 124* 130*   ALBUMIN g/dL  --  3.2* 3.5 4.1   BILIRUBIN mg/dL  --  0.4 0.5 0.6   ALK PHOS U/L  --  67 70 79   AST (SGOT) U/L  --  23 21 24   ALT (SGPT) U/L  --  32 32 35*   Estimated Creatinine Clearance: 66.9 mL/min (A) (by C-G formula based on SCr of 1.29 mg/dL (H)).  No results found for: AMMONIA    Hemoglobin A1C   Date/Time Value Ref Range Status   07/20/2023 0144 6.00 (H) 4.80 - 5.60 % Final     Glucose   Date/Time Value Ref Range Status   07/22/2023 1018 156 (H) 70 - 130 mg/dL Final     Comment:     Meter: [id number] : [id number] ELAINE BROWN   07/22/2023 0626 102 70 - 130 mg/dL Final     Comment:     Meter: [id number] : [id number] TIFFANIDAMIR CHISHOLM   07/21/2023 2006 140 (H) 70 - 130 mg/dL Final     Comment:     Meter: [id number] : [id number] LANCE PEOPLES   07/21/2023 1647 146 (H) 70 - 130 mg/dL Final     Comment:     Meter: [id number] : [id number] ELAINE BROWN   07/21/2023 1051 190 (H) 70 - 130 mg/dL Final     Comment:     Meter: [id number] : [id number] ELAINE BROWN   07/21/2023 0717 96  70 - 130 mg/dL Final     Comment:     Meter: [id number] : [id number] OBI POTTS   07/20/2023 2004 150 (H) 70 - 130 mg/dL Final     Comment:     Meter: [id number] : [id number] LANCE PEOPLES   07/20/2023 1647 114 70 - 130 mg/dL Final     Comment:     Meter: [id number] : [id number] ELAINE BROWN     Lab Results   Component Value Date    HGBA1C 6.00 (H) 07/20/2023     Lab Results   Component Value Date    TSH 1.320 07/19/2023    FREET4 1.82 (H) 07/19/2023       Blood Culture   Date Value Ref Range Status   07/19/2023 No growth at 2 days  Preliminary   07/19/2023 No growth at 2 days  Preliminary     Urine Culture   Date Value Ref Range Status   07/19/2023 No growth  Final     No results found for: WOUNDCX  No results found for: STOOLCX  No results found for: RESPCX  Pain Management Panel  More data exists         Latest Ref Rng & Units 7/20/2023 5/17/2023   Pain Management Panel   Creatinine, Urine mg/dL 167.0  369.6        Pertinent Radiology Results:  Imaging Results (All)       Procedure Component Value Units Date/Time    CT Abdomen Pelvis Without Contrast [[id number]] Collected: 07/19/23 2155     Updated: 07/19/23 2159    Narrative:            Procedure: CT abdomen pelvis without intravenous contrast.  Sagittal and  coronal reformations are supplied.     Comparison: CT abdomen pelvis 5/17/2023.     Findings: Lung bases are normal.     Evaluation of solid organs is limited without the use of intravenous  contrast.  Allowing for this, fatty infiltration of the liver noted.   Surgical absence of the gallbladder present.  Noncontrast enhanced  pancreas, spleen, adrenals and kidneys are morphologically unremarkable.   No obstructing uropathy.  Appendix is normal in the right lower  quadrant.  CT appearance of the uterus and adnexa within normal limits.   Urinary bladder partly distended and morphologically unremarkable.  Mild  diverticulosis of the sigmoid colon.  No inflammatory change.  Aorta and  IVC have  a normal appearance.     No ascites, adenopathy or free fluid in the pelvis.     In bone windows, moderate to advanced facet hypertrophic changes at  L3-L4 through L5-S1.       Impression:         No CT evidence of an acute abdominal or pelvic abnormality.     Interval resolution of the right adnexal cyst.     This report was finalized on 7/19/2023 9:57 PM by Pilar Rojas MD.               Discharge Disposition/Discharge Medications/Discharge Appointments     Discharge Disposition:   Home or Self Care    Condition at Discharge:  Stable     Discharge Medications:     Your medication list        START taking these medications        Instructions Last Dose Given Next Dose Due   cefdinir 300 MG capsule  Commonly known as: OMNICEF      Take 1 capsule by mouth 2 (Two) times a day for 3 days.              CHANGE how you take these medications        Instructions Last Dose Given Next Dose Due   lisinopril 40 MG tablet  Commonly known as: PRINIVIL,ZESTRIL  Start taking on: July 23, 2023  What changed: how much to take      Take 0.5 tablets by mouth Daily for 30 days.              CONTINUE taking these medications        Instructions Last Dose Given Next Dose Due   atorvastatin 40 MG tablet  Commonly known as: LIPITOR      Take 1 tablet by mouth Daily.       busPIRone 15 MG tablet  Commonly known as: BUSPAR      Take 1 tablet by mouth 3 (Three) Times a Day As Needed (anxiety).       doxepin 10 MG capsule  Commonly known as: SINEquan      Take 1 capsule by mouth Every Night.       levothyroxine 75 MCG tablet  Commonly known as: SYNTHROID, LEVOTHROID      Take 1 tablet by mouth Daily.       metFORMIN 500 MG tablet  Commonly known as: GLUCOPHAGE  Start taking on: July 23, 2023      Take 1 tablet by mouth Every 12 (Twelve) Hours.       metoprolol succinate XL 50 MG 24 hr tablet  Commonly known as: TOPROL-XL      Take 1 tablet by mouth Daily.       omeprazole 20 MG capsule  Commonly known as: priLOSEC      Take 1 capsule by  mouth Daily.       pramipexole 0.25 MG tablet  Commonly known as: MIRAPEX      Take 1 tablet by mouth Every Night.                 Where to Get Your Medications        These medications were sent to [facility]   [place] [address]      Hours: 8AM-6PM Mon-Fri Phone: [phone]   cefdinir 300 MG capsule       Information about where to get these medications is not yet available    Ask your nurse or doctor about these medications  lisinopril 40 MG tablet  metFORMIN 500 MG tablet        Discharge Diet:  Diet Instructions    Regular as Toelrated           Discharge Activity:  Activity Instructions    Activity as tolerated             Time spent on this discharge exceeded 30 minutes.

## 2023-07-22 NOTE — PROGRESS NOTES
Nephrology Progress Note      Subjective     Feels better no chest pain or shortness of breath her oral intake has significantly improved    Objective       Vital signs :     Temp:  [97.7 °F (36.5 °C)-98.5 °F (36.9 °C)] 97.9 °F (36.6 °C)  Heart Rate:  [75-99] 79  Resp:  [16-18] 16  BP: ()/(62-73) 116/73    Intake/Output                               07/20/23 0701 - 07/21/23 0700 07/21/23 0701 - 07/22/23 0700 07/22/23 0701 - 07/23/23 0700     1657-3751 1708-1094 Total 1830-3089 0852-6350 Total 8439-4557 8417-8471 Total                    Intake    P.O.  460  240 700  1440  480 1920  540  -- 540    I.V.  1006.6  -- 1006.6  1707.7  -- 1707.7  --  -- --    Total Intake 1466.6 240 1706.6 3147.7 480 3627.7 540 -- 540       Output    Urine  --  1000 1000  2200  1000 3200  --  -- --    Stool  --  -- --  --  0 0  --  -- --    Total Output -- 1000 1000 2200 1000 3200 -- -- --             Physical Exam:    General Appearance : alert, pleasant, appears stated age, cooperative and alert  Lungs : clear to auscultation, respirations regular  Heart :  regular rhythm & normal rate, normal S1, S2 and no murmur, no rub  Abdomen : soft, non distended  Extremities : No edema edema,   Neurologic :   orientated to person, place, time and situation, Grossly no focal deficits        Laboratory Data :     Albumin Albumin   Date Value Ref Range Status   07/21/2023 3.2 (L) 3.5 - 5.2 g/dL Final   07/20/2023 3.5 3.5 - 5.2 g/dL Final   07/19/2023 4.1 3.5 - 5.2 g/dL Final      Magnesium Magnesium   Date Value Ref Range Status   07/19/2023 1.9 1.6 - 2.6 mg/dL Final          PTH               No results found for: PTH    CBC and coagulation:  Results from last 7 days   Lab Units 07/22/23  0325 07/21/23  0823 07/20/23  0144 07/19/23  2338 07/19/23  1803   LACTATE mmol/L  --   --   --  1.0  --    SED RATE mm/hr  --   --   --   --  17   CRP mg/dL  --   --   --   --  1.48*   WBC 10*3/mm3 9.65 8.97 12.73*  --  12.93*   HEMOGLOBIN g/dL 10.7* 11.2*  11.5*  --  13.1   HEMATOCRIT % 32.8* 34.4 35.7  --  39.3   MCV fL 86.5 84.9 84.4  --  83.4   MCHC g/dL 32.6 32.6 32.2  --  33.3   PLATELETS 10*3/mm3 375 399 395  --  496*     Acid/base balance:      Renal and electrolytes:    Results from last 7 days   Lab Units 07/22/23  0325 07/21/23 0823 07/20/23  0144 07/19/23  1803   SODIUM mmol/L 142 139 133* 136   POTASSIUM mmol/L 3.6 3.2* 3.1* 3.3*   MAGNESIUM mg/dL  --   --   --  1.9   CHLORIDE mmol/L 109* 105 98 97*   CO2 mmol/L 22.2 23.1 19.8* 22.3   BUN mg/dL 13 14 21* 21*   CREATININE mg/dL 1.29* 2.06* 5.15* 5.20*   CALCIUM mg/dL 8.6 8.7 9.1 9.9     Estimated Creatinine Clearance: 66.9 mL/min (A) (by C-G formula based on SCr of 1.29 mg/dL (H)).  @GFRCG:3@   Liver and pancreatic function:  Results from last 7 days   Lab Units 07/21/23  0823 07/20/23  0144 07/19/23  1803   ALBUMIN g/dL 3.2* 3.5 4.1   BILIRUBIN mg/dL 0.4 0.5 0.6   ALK PHOS U/L 67 70 79   AST (SGOT) U/L 23 21 24   ALT (SGPT) U/L 32 32 35*         Cardiac:  Results from last 7 days   Lab Units 07/19/23  1803   PROBNP pg/mL 83.4     Liver and pancreatic function:  Results from last 7 days   Lab Units 07/21/23  0823 07/20/23  0144 07/19/23  1803   ALBUMIN g/dL 3.2* 3.5 4.1   BILIRUBIN mg/dL 0.4 0.5 0.6   ALK PHOS U/L 67 70 79   AST (SGOT) U/L 23 21 24   ALT (SGPT) U/L 32 32 35*       Medications :     atorvastatin, 40 mg, Oral, Daily  cefTRIAXone, 2,000 mg, Intravenous, Q24H  doxepin, 10 mg, Oral, Nightly  heparin (porcine), 5,000 Units, Subcutaneous, Q12H  levothyroxine, 75 mcg, Oral, Daily  metoprolol succinate XL, 50 mg, Oral, Daily  pantoprazole, 40 mg, Oral, Q AM  senna-docusate sodium, 2 tablet, Oral, BID  sodium chloride, 10 mL, Intravenous, Q12H      sodium chloride, 75 mL/hr, Last Rate: 75 mL/hr (07/22/23 0442)          Assessment & Plan     -JESÚS, nonoliguric  - Anion gap metabolic acidosis with concomitant normal anion gap metabolic acidosis  - Type 2 diabetes mellitus  - Essential hypertension  -  GERD    Creatinine has improved significantly to 1.2 today.  No signs of fluid overload clinically.  Tolerating oral fluid very well.  Will discontinue IV fluid and encouraged her oral hydration.  Patient can be discharged from nephrology standpoint and I will see her in clinic in 4 weeks.  Educated and counseled the patient    JESÚS most likely prerenal azotemia.  Baseline creatinine less than 1 admitted with 5.2.  No hematuria no obstruction on CT abdomen  97 mg/g proteinuria  No thrombocytopenia and no evidence of hemolysis so less likely HUS/TTP    -Continue on balanced bicarb fluid      Yovana Rosales MD  07/22/23  09:30 EDT

## 2023-07-22 NOTE — PROGRESS NOTES
PROGRESS NOTE         Patient Identification:  Name:  Arabella Rose  Age:  48 y.o.  Sex:  female  :  1975  MRN:  [id number]  Visit Number:  [id number]  Primary Care Provider:  Benigno Verduzco PA-C         LOS: 3 days       ----------------------------------------------------------------------------------------------------------------------  Subjective       Chief Complaints:    Urinary Retention        Interval History:      Patient resting comfortably in bed this morning.  Overall feels much better today.  Currently on room air with no apparent distress.  Lungs clear to auscultation bilaterally.  Abdomen soft, nontender.  Reports stools becoming more formed.  WBC normal at 9.65.  Creatinine improved at 1.29.    Review of Systems:    Constitutional: no fever, chills and night sweats.  Generalized fatigue.  Eyes: no eye drainage, itching or redness.  HEENT: no mouth sores, dysphagia or nose bleed.  Respiratory: no for shortness of breath, cough or production of sputum.  Cardiovascular: no chest pain, no palpitations, no orthopnea.  Gastrointestinal: no nausea, vomiting. No diarrhea. No abdominal pain, hematemesis or rectal bleeding.  Genitourinary: no dysuria or polyuria.  Hematologic/lymphatic: no lymph node abnormalities, no easy bruising or easy bleeding.  Musculoskeletal: no muscle or joint pain.  Skin: No rash and no itching.  Neurological: no loss of consciousness, no seizure, no headache.  Behavioral/Psych: no depression or suicidal ideation.  Endocrine: no hot flashes.  Immunologic: negative.    ----------------------------------------------------------------------------------------------------------------------      Objective       [facility] Meds:  atorvastatin, 40 mg, Oral, Daily  cefTRIAXone, 2,000 mg, Intravenous, Q24H  doxepin, 10 mg, Oral, Nightly  heparin (porcine), 5,000 Units, Subcutaneous, Q12H  levothyroxine, 75 mcg, Oral, Daily  metoprolol succinate XL, 50 mg,  Oral, Daily  pantoprazole, 40 mg, Oral, Q AM  senna-docusate sodium, 2 tablet, Oral, BID  sodium chloride, 10 mL, Intravenous, Q12H           ----------------------------------------------------------------------------------------------------------------------    Vital Signs:  Temp:  [97.7 °F (36.5 °C)-98.5 °F (36.9 °C)] 97.9 °F (36.6 °C)  Heart Rate:  [75-99] 79  Resp:  [16-18] 16  BP: ()/(62-73) 116/73  Mean Arterial Pressure (Non-Invasive) for the past 24 hrs (Last 3 readings):   Noninvasive MAP (mmHg)   07/22/23 0650 89     SpO2 Percentage    07/21/23 1851 07/22/23 0300 07/22/23 0650   SpO2: 96% 98% 98%     SpO2:  [96 %-98 %] 98 %  on   ;   Device (Oxygen Therapy): room air    Body mass index is 45.94 kg/m².  Wt Readings from Last 3 Encounters:   07/22/23 121 kg (267 lb 12.8 oz)   05/19/23 125 kg (275 lb 14.4 oz)   02/12/23 124 kg (273 lb)        Intake/Output Summary (Last 24 hours) at 7/22/2023 1115  Last data filed at 7/22/2023 0900  Gross per 24 hour   Intake 3927.74 ml   Output 2400 ml   Net 1527.74 ml       Diet: Regular/House Diet; Texture: Regular Texture (IDDSI 7); Fluid Consistency: Thin (IDDSI 0)  ----------------------------------------------------------------------------------------------------------------------      Physical Exam:    Constitutional:  Well-developed and well-nourished.  No respiratory distress.  On room air.   Sitting in bed.  HENT:  Head: Normocephalic and atraumatic.  Mouth:  Moist mucous membranes.    Eyes:  Conjunctivae and EOM are normal.  No scleral icterus.  Neck:  Neck supple.  No JVD present.    Cardiovascular:  Normal rate, regular rhythm and normal heart sounds with no murmur. No edema.  Pulmonary/Chest:  No respiratory distress, no wheezes, no crackles, with normal breath sounds and good air movement.  Clear to auscultation bilaterally.  Abdominal:  Soft.  Bowel sounds are normal.  No distension and no tenderness.   Musculoskeletal:  No edema, no tenderness, and no  deformity.  No swelling or redness of joints.  Neurological:  Alert and oriented to person, place, and time.  No facial droop.  No slurred speech.   Skin:  Skin is warm and dry.  No rash noted.  No pallor.   Psychiatric:  Normal mood and affect.  Behavior is normal.        ----------------------------------------------------------------------------------------------------------------------  Results from last 7 days   Lab Units 07/19/23 2025 07/19/23 1803   CK TOTAL U/L 193*  --    HSTROP T ng/L 27* 29*       Results from last 7 days   Lab Units 07/19/23  1803   PROBNP pg/mL 83.4           Results from last 7 days   Lab Units 07/22/23 0325 07/21/23 0823 07/20/23 0144 07/19/23 2338 07/19/23  1803   CRP mg/dL  --   --   --   --  1.48*   LACTATE mmol/L  --   --   --  1.0  --    WBC 10*3/mm3 9.65 8.97 12.73*  --  12.93*   HEMOGLOBIN g/dL 10.7* 11.2* 11.5*  --  13.1   HEMATOCRIT % 32.8* 34.4 35.7  --  39.3   MCV fL 86.5 84.9 84.4  --  83.4   MCHC g/dL 32.6 32.6 32.2  --  33.3   PLATELETS 10*3/mm3 375 399 395  --  496*       Results from last 7 days   Lab Units 07/22/23 0325 07/21/23 0823 07/20/23 0144 07/19/23  1803   SODIUM mmol/L 142 139 133* 136   POTASSIUM mmol/L 3.6 3.2* 3.1* 3.3*   MAGNESIUM mg/dL  --   --   --  1.9   CHLORIDE mmol/L 109* 105 98 97*   CO2 mmol/L 22.2 23.1 19.8* 22.3   BUN mg/dL 13 14 21* 21*   CREATININE mg/dL 1.29* 2.06* 5.15* 5.20*   CALCIUM mg/dL 8.6 8.7 9.1 9.9   GLUCOSE mg/dL 120* 123* 124* 130*   ALBUMIN g/dL  --  3.2* 3.5 4.1   BILIRUBIN mg/dL  --  0.4 0.5 0.6   ALK PHOS U/L  --  67 70 79   AST (SGOT) U/L  --  23 21 24   ALT (SGPT) U/L  --  32 32 35*     Estimated Creatinine Clearance: 66.9 mL/min (A) (by C-G formula based on SCr of 1.29 mg/dL (H)).  No results found for: AMMONIA    Hemoglobin A1C   Date/Time Value Ref Range Status   07/20/2023 0144 6.00 (H) 4.80 - 5.60 % Final     Glucose   Date/Time Value Ref Range Status   07/22/2023 1018 156 (H) 70 - 130 mg/dL Final     Comment:      Meter: [id number] : [id number] ELAINE SÁNCHEZER   07/22/2023 0626 102 70 - 130 mg/dL Final     Comment:     Meter: [id number] : [id number] TIFFANI CHISHOLM   07/21/2023 2006 140 (H) 70 - 130 mg/dL Final     Comment:     Meter: [id number] : [id number] LANCE PEOPLES   07/21/2023 1647 146 (H) 70 - 130 mg/dL Final     Comment:     Meter: [id number] : [id number] ELAINE BROWN   07/21/2023 1051 190 (H) 70 - 130 mg/dL Final     Comment:     Meter: [id number] : [id number] ELAINE BROWN   07/21/2023 0717 96 70 - 130 mg/dL Final     Comment:     Meter: [id number] : [id number] OBI LEWISS   07/20/2023 2004 150 (H) 70 - 130 mg/dL Final     Comment:     Meter: [id number] : [id number] LANCE PEOPLES   07/20/2023 1647 114 70 - 130 mg/dL Final     Comment:     Meter: [id number] : [id number] ELAINE BROWN     Lab Results   Component Value Date    HGBA1C 6.00 (H) 07/20/2023     Lab Results   Component Value Date    TSH 1.320 07/19/2023    FREET4 1.82 (H) 07/19/2023       Blood Culture   Date Value Ref Range Status   07/19/2023 No growth at 24 hours  Preliminary   07/19/2023 No growth at 24 hours  Preliminary     Urine Culture   Date Value Ref Range Status   07/19/2023 No growth  Final     No results found for: WOUNDCX  No results found for: STOOLCX  No results found for: RESPCX  Pain Management Panel  More data exists         Latest Ref Rng & Units 7/20/2023 5/17/2023   Pain Management Panel   Creatinine, Urine mg/dL 167.0  369.6        ----------------------------------------------------------------------------------------------------------------------  Imaging Results (Last 24 Hours)       ** No results found for the last 24 hours. **            ----------------------------------------------------------------------------------------------------------------------    Pertinent Infectious Disease Results    Presented to [facility] Emergency Department on 7/19/2023 for elevated creatinine.  WBC  stable at 12.73.  CRP 1.48.  Lactic acid normal at 1.0.  GI panel PCR detected Salmonella.  Urinalysis from 7/19/2023 slightly positive with 6-12 WBCs, however contaminated specimen with 7-12 squamous cells, culture finalized with no growth COVID-19 and influenza PCR negative.  Blood cultures from 7/19/2023 show no growth thus far. Stool culture currently in progress.  CT of the abdomen and pelvis from 7/19/2023 reports no evidence of acute abdominal or pelvic abnormality, interval resolution of right adnexal cyst.     Assessment/Plan       Assessment       Salmonella gastroenteritis  UTI         Plan        Patient resting comfortably in bed this morning.  Overall feels much better today.  Currently on room air with no apparent distress.  Lungs clear to auscultation bilaterally.  Abdomen soft, nontender.  Reports stools becoming more formed.  WBC normal at 9.65.  Creatinine improved at 1.29.    We recommend to continue ceftriaxone 2 g IV every 24 hours while hospitalized, upon discharge patient can be de-escalated to oral cefdinir to be taken through 7/24/2023 to ensure complete treatment of the Salmonella infection.  Patient stable from infectious disease standpoint.  Okay to discharge from ID standpoint.      ANTIMICROBIAL THERAPY    cefdinir - 300 MG  cefTRIAXone (ROCEPHIN) 2000 mg IVPB in 100 mL NS (VTB)     Code Status:   Code Status and Medical Interventions:   Ordered at: 07/19/23 6046     Level Of Support Discussed With:    Patient     Code Status (Patient has no pulse and is not breathing):    CPR (Attempt to Resuscitate)     Medical Interventions (Patient has pulse or is breathing):    Full Support     Release to patient:    Routine Release       MARTHA Jones  07/22/23  11:15 EDT

## 2023-07-22 NOTE — PLAN OF CARE
Goal Outcome Evaluation:  Plan of Care Reviewed With: patient        Progress: improving          Pt has rested in bed during entirety of shift. Pt has ambulated well. VSS. No complaints from pt at this time. No acute changes noted. Will continue with plan of care.

## 2023-07-24 ENCOUNTER — TELEPHONE (OUTPATIENT)
Dept: MEDSURG UNIT | Facility: HOSPITAL | Age: 48
End: 2023-07-24
Payer: MEDICAID

## 2023-07-24 NOTE — PROGRESS NOTES
Enter Query Response Below      Query Response: obesity             If applicable, please update the problem list.     Patient: Arabella Rose        : 1975  Account: [id number]           Admit Date: 2023        How to Respond to this query:       a. Click New Note     b. Answer query within the yellow box.                c. Update the Problem List, if applicable.      If you have any questions about this query contact me at: [email or web address]    Solitario Jean PA-C    Thank you for your response to the [facility]I query.   When responding to queries sent by [facility]I, the preferred method is to respond with a New Note. By responding with a new note, the note will include your query response and signature.  How to Respond to this query:  a. Click New Note  b. Answer query within the yellow box.  c. Update the Problem List, if applicable.    ----- Message -----  From: Von Vela DO  Sent: 2023   7:49 AM EDT  To: Claudia Christie  Subject: RE: CDI Query                                        As clearly defined by BMI anything 30 or greater is obese therefore the patient would be defined as obese per the documented BMI of 45.8-45.9.      ----- Message -----  From: Claudia Christie  Sent: 2023   7:22 AM EDT  To: Solitario Jean PA-C  Subject: CDI Query                                            Patient: Arabella Rose        : 1975  Account: [id number]           Admit Date: 2023                                                    How to Respond to this query:                   a. Click New Note                b. Answer query within the yellow box.                c. Update the Problem List, if applicable.        If you have any questions about this query contact me at: [email or web address]     Mr. Jean,     Patient with type 2 diabetes and BMI documented to be 45.8 - 45.9.     Can this be further clarified as:     - obesity  - overweight  - other  ___________________  - clinically indeterminable     By submitting this query, we are merely seeking further clarification of documentation to accurately reflect all conditions that you are monitoring, evaluating, treating or that extend the hospitalization or utilize additional resources of care. Please utilize your independent clinical judgment when addressing the question(s) above.      This query and your response, once completed, will be entered into the legal medical record.     Sincerely,  Claudia Christie RN [facility]  Clinical Documentation Integrity Program     Esig query sent 7/24 by:  Lidya BLUE RN, [facility]  Clinical Documentation Integrity Program   [email or web address]

## 2023-07-25 LAB
BACTERIA SPEC AEROBE CULT: NORMAL
BACTERIA SPEC AEROBE CULT: NORMAL

## 2023-07-26 ENCOUNTER — READMISSION MANAGEMENT (OUTPATIENT)
Dept: CALL CENTER | Facility: HOSPITAL | Age: 48
End: 2023-07-26
Payer: MEDICAID

## 2023-07-26 NOTE — OUTREACH NOTE
Sepsis Week 1 Survey      Flowsheet Row Responses   [facility] patient discharged from? [facility]   Does the patient have one of the following disease processes/diagnoses(primary or secondary)? Sepsis   Week 1 attempt successful? Yes   Call start time 0932   Call end time 0934   Discharge diagnosis JESÚS/sepsis,  salmonella gastroenteritis   Meds reviewed with patient/caregiver? Yes   Is the patient having any side effects they believe may be caused by any medication additions or changes? No   Does the patient have all medications related to this admission filled (includes all antibiotics, inhalers, nebulizers,steroids,etc.) Yes   Is the patient taking all medications as directed (includes completed medication regime)? Yes   Does the patient have a primary care provider?  Yes   Comments regarding PCP Patient has an appointment 8/1/23   Does the patient have an appointment with their PCP within 7 days of discharge? Greater than 7 days   What is preventing the patient from scheduling follow up appointments within 7 days of discharge? Earlier appointment not available   Nursing Interventions Verified appointment date/time/provider   Has home health visited the patient within 72 hours of discharge? N/A   Psychosocial issues? No   Did the patient receive a copy of their discharge instructions? Yes   Nursing interventions Reviewed instructions with patient   What is the patient's perception of their health status since discharge? Improving   Nursing interventions Nurse provided patient education   Is the patient/caregiver able to teach back TIME? T emperature - higher or lower than normal, I nfection - may have signs and symptoms of an infection, M ental Decline - confused, sleepy, difficult to arouse, E xtremely Ill - severe pain, discomfort, shortness of breath   Is patient/caregiver able to teach back steps to recovery at home? Set small, achievable goals for return to baseline health, Rest and regain strength   Is  the patient/caregiver able to teach back signs and symptoms of worsening condition: Fever, Hyperthermia, Rapid heart rate (>90), Shortness of breath/rapid respiratory rate, Altered mental status(confusion/coma), Edema, High blood glucose without diabetes   If the patient is a current smoker, are they able to teach back resources for cessation? Not a smoker   Is the patient/caregiver able to teach back the hierarchy of who to call/visit for symptoms/problems? PCP, Specialist, Home health nurse, Urgent Care, ED, 911 Yes   Week 1 call completed? Yes   Call end time 3134            Martha BRONSON - Licensed Nurse

## 2023-07-30 LAB — BACTERIA SPEC AEROBE CULT: NO GROWTH

## 2023-08-03 ENCOUNTER — READMISSION MANAGEMENT (OUTPATIENT)
Dept: CALL CENTER | Facility: HOSPITAL | Age: 48
End: 2023-08-03
Payer: MEDICAID

## 2023-08-07 ENCOUNTER — READMISSION MANAGEMENT (OUTPATIENT)
Dept: CALL CENTER | Facility: HOSPITAL | Age: 48
End: 2023-08-07
Payer: MEDICAID

## 2023-08-07 NOTE — OUTREACH NOTE
Sepsis Week 2 Survey      Flowsheet Row Responses   [facility] patient discharged from? [facility]   Does the patient have one of the following disease processes/diagnoses(primary or secondary)? Sepsis   Week 2 attempt successful? Yes   Call start time 1634   Call end time 1639   Discharge diagnosis JESÚS/sepsis,  salmonella gastroenteritis   Person spoke with today (if not patient) and relationship Patient   Meds reviewed with patient/caregiver? Yes   Is the patient having any side effects they believe may be caused by any medication additions or changes? No   Does the patient have all medications related to this admission filled (includes all antibiotics, inhalers, nebulizers,steroids,etc.) Yes   Is the patient taking all medications as directed (includes completed medication regime)? Yes   Medication comments patient completed Cefdinir abd as directed   Does the patient have a primary care provider?  Yes   Comments regarding PCP patient saw Dr. Verduzco, PCP on 8/1/23.   Does the patient have an appointment with their PCP within 7 days of discharge? Greater than 7 days   What is preventing the patient from scheduling follow up appointments within 7 days of discharge? Earlier appointment not available   Has the patient kept scheduled appointments due by today? Yes   Has home health visited the patient within 72 hours of discharge? N/A   Psychosocial issues? No   Did the patient receive a copy of their discharge instructions? Yes   Nursing interventions Reviewed instructions with patient   What is the patient's perception of their health status since discharge? Improving  [patient states she is doing well. kidney function back to normal. no fevers.]   Nursing interventions Nurse provided patient education   Is the patient/caregiver able to teach back TIME? T emperature - higher or lower than normal, I nfection - may have signs and symptoms of an infection, M ental Decline - confused, sleepy, difficult to arouse, E  xtremely Ill - severe pain, discomfort, shortness of breath   Nursing interventions Nurse provided reassurance to patient   Is patient/caregiver able to teach back steps to recovery at home? Set small, achievable goals for return to baseline health, Rest and regain strength, Eat a balanced diet   Is the patient/caregiver able to teach back signs and symptoms of worsening condition: Fever, Rapid heart rate (>90), Shortness of breath/rapid respiratory rate   If the patient is a current smoker, are they able to teach back resources for cessation? Not a smoker   Is the patient/caregiver able to teach back the hierarchy of who to call/visit for symptoms/problems? PCP, Specialist, Home health nurse, Urgent Care, ED, 911 Yes   Week 2 call completed? Yes   Graduated Yes   Is the patient interested in additional calls from an ambulatory ? No   Would this patient benefit from a Referral to Amb Social Work? No   Wrap up additional comments patient doing well. no concerns or issues.   Call end time 2266            Elisabeth TEJADA - Registered Nurse

## 2023-09-02 ENCOUNTER — HOSPITAL ENCOUNTER (EMERGENCY)
Facility: HOSPITAL | Age: 48
Discharge: HOME | End: 2023-09-02
Attending: STUDENT IN AN ORGANIZED HEALTH CARE EDUCATION/TRAINING PROGRAM
Payer: MEDICAID

## 2023-09-02 VITALS
BODY MASS INDEX: 46.95 KG/M2 | SYSTOLIC BLOOD PRESSURE: 117 MMHG | OXYGEN SATURATION: 98 % | TEMPERATURE: 98.2 F | HEART RATE: 70 BPM | HEIGHT: 64 IN | DIASTOLIC BLOOD PRESSURE: 79 MMHG | WEIGHT: 275 LBS | RESPIRATION RATE: 17 BRPM

## 2023-09-02 DIAGNOSIS — M79.18 MUSCULOSKELETAL PAIN: Primary | ICD-10-CM

## 2023-09-02 LAB
ALBUMIN SERPL-MCNC: 4.3 G/DL (ref 3.5–5.2)
ALBUMIN/GLOB SERPL: 1.4 G/DL
ALP SERPL-CCNC: 95 U/L (ref 39–117)
ALT SERPL W P-5'-P-CCNC: 33 U/L (ref 1–33)
ANION GAP SERPL CALCULATED.3IONS-SCNC: 11 MMOL/L (ref 5–15)
AST SERPL-CCNC: 33 U/L (ref 1–32)
BASOPHILS # BLD AUTO: 0.05 10*3/MM3 (ref 0–0.2)
BASOPHILS NFR BLD AUTO: 0.5 % (ref 0–1.5)
BILIRUB SERPL-MCNC: 0.6 MG/DL (ref 0–1.2)
BILIRUB UR QL STRIP: NEGATIVE
BUN SERPL-MCNC: 13 MG/DL (ref 6–20)
BUN/CREAT SERPL: 13.8 (ref 7–25)
CALCIUM SPEC-SCNC: 9.4 MG/DL (ref 8.6–10.5)
CHLORIDE SERPL-SCNC: 103 MMOL/L (ref 98–107)
CLARITY UR: CLEAR
CO2 SERPL-SCNC: 26 MMOL/L (ref 22–29)
COLOR UR: YELLOW
CREAT SERPL-MCNC: 0.94 MG/DL (ref 0.57–1)
CRP SERPL-MCNC: 0.86 MG/DL (ref 0–0.5)
DEPRECATED RDW RBC AUTO: 43.5 FL (ref 37–54)
EGFRCR SERPLBLD CKD-EPI 2021: 74.1 ML/MIN/1.73
EOSINOPHIL # BLD AUTO: 0.24 10*3/MM3 (ref 0–0.4)
EOSINOPHIL NFR BLD AUTO: 2.5 % (ref 0.3–6.2)
ERYTHROCYTE [DISTWIDTH] IN BLOOD BY AUTOMATED COUNT: 13.8 % (ref 12.3–15.4)
FLUAV RNA RESP QL NAA+PROBE: NOT DETECTED
FLUBV RNA ISLT QL NAA+PROBE: NOT DETECTED
GLOBULIN UR ELPH-MCNC: 3.1 GM/DL
GLUCOSE SERPL-MCNC: 128 MG/DL (ref 65–99)
GLUCOSE UR STRIP-MCNC: NEGATIVE MG/DL
HCT VFR BLD AUTO: 37.4 % (ref 34–46.6)
HGB BLD-MCNC: 12 G/DL (ref 12–15.9)
HGB UR QL STRIP.AUTO: NEGATIVE
HOLD SPECIMEN: NORMAL
HOLD SPECIMEN: NORMAL
IMM GRANULOCYTES # BLD AUTO: 0.03 10*3/MM3 (ref 0–0.05)
IMM GRANULOCYTES NFR BLD AUTO: 0.3 % (ref 0–0.5)
KETONES UR QL STRIP: NEGATIVE
LEUKOCYTE ESTERASE UR QL STRIP.AUTO: NEGATIVE
LYMPHOCYTES # BLD AUTO: 2.65 10*3/MM3 (ref 0.7–3.1)
LYMPHOCYTES NFR BLD AUTO: 28.1 % (ref 19.6–45.3)
MAGNESIUM SERPL-MCNC: 1.9 MG/DL (ref 1.6–2.6)
MCH RBC QN AUTO: 27.8 PG (ref 26.6–33)
MCHC RBC AUTO-ENTMCNC: 32.1 G/DL (ref 31.5–35.7)
MCV RBC AUTO: 86.8 FL (ref 79–97)
MONOCYTES # BLD AUTO: 0.69 10*3/MM3 (ref 0.1–0.9)
MONOCYTES NFR BLD AUTO: 7.3 % (ref 5–12)
NEUTROPHILS NFR BLD AUTO: 5.76 10*3/MM3 (ref 1.7–7)
NEUTROPHILS NFR BLD AUTO: 61.3 % (ref 42.7–76)
NITRITE UR QL STRIP: NEGATIVE
NRBC BLD AUTO-RTO: 0 /100 WBC (ref 0–0.2)
PH UR STRIP.AUTO: 6 [PH] (ref 5–8)
PLATELET # BLD AUTO: 286 10*3/MM3 (ref 140–450)
PMV BLD AUTO: 8.8 FL (ref 6–12)
POTASSIUM SERPL-SCNC: 3.4 MMOL/L (ref 3.5–5.2)
PROT SERPL-MCNC: 7.4 G/DL (ref 6–8.5)
PROT UR QL STRIP: NEGATIVE
QT INTERVAL: 416 MS
QTC INTERVAL: 479 MS
RBC # BLD AUTO: 4.31 10*6/MM3 (ref 3.77–5.28)
SARS-COV-2 RNA RESP QL NAA+PROBE: NOT DETECTED
SODIUM SERPL-SCNC: 140 MMOL/L (ref 136–145)
SP GR UR STRIP: <=1.005 (ref 1–1.03)
T4 FREE SERPL-MCNC: 1.42 NG/DL (ref 0.93–1.7)
TROPONIN T SERPL HS-MCNC: 9 NG/L
TSH SERPL DL<=0.05 MIU/L-ACNC: 4.14 UIU/ML (ref 0.27–4.2)
UROBILINOGEN UR QL STRIP: NORMAL
WBC NRBC COR # BLD: 9.42 10*3/MM3 (ref 3.4–10.8)
WHOLE BLOOD HOLD COAG: NORMAL
WHOLE BLOOD HOLD SPECIMEN: NORMAL

## 2023-09-02 PROCEDURE — 25010000002 METHOCARBAMOL 1000 MG/10ML SOLUTION: Performed by: NURSE PRACTITIONER

## 2023-09-02 PROCEDURE — 93005 ELECTROCARDIOGRAM TRACING: CPT | Performed by: NURSE PRACTITIONER

## 2023-09-02 PROCEDURE — 87636 SARSCOV2 & INF A&B AMP PRB: CPT | Performed by: NURSE PRACTITIONER

## 2023-09-02 PROCEDURE — 84439 ASSAY OF FREE THYROXINE: CPT | Performed by: NURSE PRACTITIONER

## 2023-09-02 PROCEDURE — 85025 COMPLETE CBC W/AUTO DIFF WBC: CPT | Performed by: NURSE PRACTITIONER

## 2023-09-02 PROCEDURE — 99283 EMERGENCY DEPT VISIT LOW MDM: CPT

## 2023-09-02 PROCEDURE — 83735 ASSAY OF MAGNESIUM: CPT | Performed by: NURSE PRACTITIONER

## 2023-09-02 PROCEDURE — 25010000002 KETOROLAC TROMETHAMINE PER 15 MG: Performed by: NURSE PRACTITIONER

## 2023-09-02 PROCEDURE — 93010 ELECTROCARDIOGRAM REPORT: CPT | Performed by: INTERNAL MEDICINE

## 2023-09-02 PROCEDURE — 80053 COMPREHEN METABOLIC PANEL: CPT | Performed by: NURSE PRACTITIONER

## 2023-09-02 PROCEDURE — 84484 ASSAY OF TROPONIN QUANT: CPT | Performed by: NURSE PRACTITIONER

## 2023-09-02 PROCEDURE — 84443 ASSAY THYROID STIM HORMONE: CPT | Performed by: NURSE PRACTITIONER

## 2023-09-02 PROCEDURE — 36415 COLL VENOUS BLD VENIPUNCTURE: CPT

## 2023-09-02 PROCEDURE — 96374 THER/PROPH/DIAG INJ IV PUSH: CPT

## 2023-09-02 PROCEDURE — 86140 C-REACTIVE PROTEIN: CPT | Performed by: NURSE PRACTITIONER

## 2023-09-02 PROCEDURE — 96375 TX/PRO/DX INJ NEW DRUG ADDON: CPT

## 2023-09-02 PROCEDURE — 81003 URINALYSIS AUTO W/O SCOPE: CPT | Performed by: NURSE PRACTITIONER

## 2023-09-02 RX ORDER — METHOCARBAMOL 100 MG/ML
500 INJECTION, SOLUTION INTRAMUSCULAR; INTRAVENOUS ONCE
Status: COMPLETED | OUTPATIENT
Start: 2023-09-02 | End: 2023-09-02

## 2023-09-02 RX ORDER — KETOROLAC TROMETHAMINE 30 MG/ML
30 INJECTION, SOLUTION INTRAMUSCULAR; INTRAVENOUS ONCE
Status: COMPLETED | OUTPATIENT
Start: 2023-09-02 | End: 2023-09-02

## 2023-09-02 RX ORDER — SODIUM CHLORIDE 0.9 % (FLUSH) 0.9 %
10 SYRINGE (ML) INJECTION AS NEEDED
Status: DISCONTINUED | OUTPATIENT
Start: 2023-09-02 | End: 2023-09-02 | Stop reason: HOSPADM

## 2023-09-02 RX ADMIN — METHOCARBAMOL 500 MG: 1000 INJECTION, SOLUTION INTRAMUSCULAR; INTRAVENOUS at 06:02

## 2023-09-02 RX ADMIN — KETOROLAC TROMETHAMINE 30 MG: 30 INJECTION, SOLUTION INTRAMUSCULAR; INTRAVENOUS at 06:02

## 2023-09-14 NOTE — ED PROVIDER NOTES
Subjective   History of Present Illness  Patient is a 48-year-old female with past medical history significant for diabetes mellitus, hyperlipidemia, depression, anxiety, hypertension, high both thyroidism and sleep apnea.  She reports she has been having upper back pain and fatigue.  She states that she has been moving the last few days.  She states that she has also had increased blood pressure and tachycardia.  She states that she has had a hard time sleeping anxiety.  She denies any chest pain or shortness of breath.  She denies any fever.  Denies any other significant complaints.      Review of Systems   Constitutional:  Positive for fatigue. Negative for fever.   HENT: Negative.     Eyes: Negative.    Respiratory: Negative.     Cardiovascular: Negative.  Negative for chest pain.   Gastrointestinal: Negative.  Negative for abdominal pain.   Endocrine: Negative.    Genitourinary: Negative.  Negative for dysuria.   Musculoskeletal:  Positive for back pain and myalgias.   Skin: Negative.    Allergic/Immunologic: Negative.    Neurological: Negative.    Hematological: Negative.    Psychiatric/Behavioral: Negative.     All other systems reviewed and are negative.    Past Medical History:   Diagnosis Date    Anxiety     Depression     Diabetes mellitus     High cholesterol     History of pulmonary embolus (PE)     Hypertension     Hypothyroidism     Sleep apnea        Allergies   Allergen Reactions    Codeine     Prozac [Fluoxetine Hcl]        Past Surgical History:   Procedure Laterality Date    CHOLECYSTECTOMY      EYE SURGERY      PULMONARY EMBOLISM SURGERY         Family History   Problem Relation Age of Onset    Stroke Mother     Diabetes Father     Heart disease Father     Hypertension Father        Social History     Socioeconomic History    Marital status:    Tobacco Use    Smoking status: Never    Smokeless tobacco: Never   Vaping Use    Vaping Use: Never used   Substance and Sexual Activity     Alcohol use: No    Drug use: No    Sexual activity: Defer           Objective   Physical Exam  Vitals and nursing note reviewed.   Constitutional:       General: She is not in acute distress.     Appearance: She is well-developed. She is not diaphoretic.   HENT:      Head: Normocephalic and atraumatic.      Right Ear: External ear normal.      Left Ear: External ear normal.      Nose: Nose normal.      Mouth/Throat:      Mouth: Mucous membranes are moist.      Pharynx: Oropharynx is clear.   Eyes:      Conjunctiva/sclera: Conjunctivae normal.      Pupils: Pupils are equal, round, and reactive to light.   Neck:      Vascular: No JVD.      Trachea: No tracheal deviation.   Cardiovascular:      Rate and Rhythm: Normal rate and regular rhythm.      Heart sounds: Normal heart sounds. No murmur heard.  Pulmonary:      Effort: Pulmonary effort is normal. No respiratory distress.      Breath sounds: Normal breath sounds. No wheezing.   Abdominal:      General: Bowel sounds are normal.      Palpations: Abdomen is soft.      Tenderness: There is no abdominal tenderness.   Musculoskeletal:         General: No deformity. Normal range of motion.      Cervical back: Normal range of motion and neck supple.   Skin:     General: Skin is warm and dry.      Capillary Refill: Capillary refill takes less than 2 seconds.      Coloration: Skin is not pale.      Findings: No erythema or rash.   Neurological:      General: No focal deficit present.      Mental Status: She is alert and oriented to person, place, and time. Mental status is at baseline.      Cranial Nerves: No cranial nerve deficit.   Psychiatric:         Mood and Affect: Mood normal.         Behavior: Behavior normal.         Thought Content: Thought content normal.         Judgment: Judgment normal.       Procedures       Results for orders placed or performed during the hospital encounter of 09/02/23   COVID-19 and FLU A/B PCR - Swab, Nasopharynx    Specimen: Nasopharynx;  Swab   Result Value Ref Range    COVID19 Not Detected Not Detected - Ref. Range    Influenza A PCR Not Detected Not Detected    Influenza B PCR Not Detected Not Detected   Comprehensive Metabolic Panel    Specimen: Arm, Right; Blood   Result Value Ref Range    Glucose 128 (H) 65 - 99 mg/dL    BUN 13 6 - 20 mg/dL    Creatinine 0.94 0.57 - 1.00 mg/dL    Sodium 140 136 - 145 mmol/L    Potassium 3.4 (L) 3.5 - 5.2 mmol/L    Chloride 103 98 - 107 mmol/L    CO2 26.0 22.0 - 29.0 mmol/L    Calcium 9.4 8.6 - 10.5 mg/dL    Total Protein 7.4 6.0 - 8.5 g/dL    Albumin 4.3 3.5 - 5.2 g/dL    ALT (SGPT) 33 1 - 33 U/L    AST (SGOT) 33 (H) 1 - 32 U/L    Alkaline Phosphatase 95 39 - 117 U/L    Total Bilirubin 0.6 0.0 - 1.2 mg/dL    Globulin 3.1 gm/dL    A/G Ratio 1.4 g/dL    BUN/Creatinine Ratio 13.8 7.0 - 25.0    Anion Gap 11.0 5.0 - 15.0 mmol/L    eGFR 74.1 >60.0 mL/min/1.73   Urinalysis With Microscopic If Indicated (No Culture) - Urine, Clean Catch    Specimen: Urine, Clean Catch   Result Value Ref Range    Color, UA Yellow Yellow, Straw    Appearance, UA Clear Clear    pH, UA 6.0 5.0 - 8.0    Specific Gravity, UA <=1.005 1.005 - 1.030    Glucose, UA Negative Negative    Ketones, UA Negative Negative    Bilirubin, UA Negative Negative    Blood, UA Negative Negative    Protein, UA Negative Negative    Leuk Esterase, UA Negative Negative    Nitrite, UA Negative Negative    Urobilinogen, UA 0.2 E.U./dL 0.2 - 1.0 E.U./dL   Single High Sensitivity Troponin T    Specimen: Arm, Right; Blood   Result Value Ref Range    HS Troponin T 9 <10 ng/L   C-reactive Protein    Specimen: Arm, Right; Blood   Result Value Ref Range    C-Reactive Protein 0.86 (H) 0.00 - 0.50 mg/dL   Magnesium    Specimen: Arm, Right; Blood   Result Value Ref Range    Magnesium 1.9 1.6 - 2.6 mg/dL   TSH    Specimen: Arm, Right; Blood   Result Value Ref Range    TSH 4.140 0.270 - 4.200 uIU/mL   T4, Free    Specimen: Arm, Right; Blood   Result Value Ref Range    Free T4  1.42 0.93 - 1.70 ng/dL   CBC Auto Differential    Specimen: Arm, Right; Blood   Result Value Ref Range    WBC 9.42 3.40 - 10.80 10*3/mm3    RBC 4.31 3.77 - 5.28 10*6/mm3    Hemoglobin 12.0 12.0 - 15.9 g/dL    Hematocrit 37.4 34.0 - 46.6 %    MCV 86.8 79.0 - 97.0 fL    MCH 27.8 26.6 - 33.0 pg    MCHC 32.1 31.5 - 35.7 g/dL    RDW 13.8 12.3 - 15.4 %    RDW-SD 43.5 37.0 - 54.0 fl    MPV 8.8 6.0 - 12.0 fL    Platelets 286 140 - 450 10*3/mm3    Neutrophil % 61.3 42.7 - 76.0 %    Lymphocyte % 28.1 19.6 - 45.3 %    Monocyte % 7.3 5.0 - 12.0 %    Eosinophil % 2.5 0.3 - 6.2 %    Basophil % 0.5 0.0 - 1.5 %    Immature Grans % 0.3 0.0 - 0.5 %    Neutrophils, Absolute 5.76 1.70 - 7.00 10*3/mm3    Lymphocytes, Absolute 2.65 0.70 - 3.10 10*3/mm3    Monocytes, Absolute 0.69 0.10 - 0.90 10*3/mm3    Eosinophils, Absolute 0.24 0.00 - 0.40 10*3/mm3    Basophils, Absolute 0.05 0.00 - 0.20 10*3/mm3    Immature Grans, Absolute 0.03 0.00 - 0.05 10*3/mm3    nRBC 0.0 0.0 - 0.2 /100 WBC   ECG 12 Lead Tachycardia   Result Value Ref Range    QT Interval 416 ms    QTC Interval 479 ms   Green Top (Gel)   Result Value Ref Range    Extra Tube Hold for add-ons.    Lavender Top   Result Value Ref Range    Extra Tube hold for add-on    Gold Top - SST   Result Value Ref Range    Extra Tube Hold for add-ons.    Light Blue Top   Result Value Ref Range    Extra Tube Hold for add-ons.       No orders to display        ED Course                                           Medical Decision Making  Problems Addressed:  Musculoskeletal pain: complicated acute illness or injury    Amount and/or Complexity of Data Reviewed  Labs: ordered.  ECG/medicine tests: ordered.    Risk  Prescription drug management.        Final diagnoses:   Musculoskeletal pain       ED Disposition  ED Disposition       ED Disposition   Discharge    Condition   Stable    Comment   --               Benigno Verduzco PA-C  [address]  [address]  [facility]  [address]  [phone]    Call in 2 days           Medication List      No changes were made to your prescriptions during this visit.            Joseline Ha, APRN  09/14/23 0107

## 2023-12-11 RX ORDER — DOXEPIN HYDROCHLORIDE 10 MG/1
10 CAPSULE ORAL DAILY
Qty: 30 CAPSULE | OUTPATIENT
Start: 2023-12-11

## 2023-12-11 RX ORDER — BUSPIRONE HYDROCHLORIDE 15 MG/1
15 TABLET ORAL DAILY
Qty: 30 TABLET | OUTPATIENT
Start: 2023-12-11

## 2023-12-11 RX ORDER — BUSPIRONE HYDROCHLORIDE 30 MG/1
30 TABLET ORAL
Qty: 30 TABLET | OUTPATIENT
Start: 2023-12-11

## 2024-03-06 ENCOUNTER — APPOINTMENT (OUTPATIENT)
Dept: CT IMAGING | Facility: HOSPITAL | Age: 49
End: 2024-03-06
Payer: MEDICAID

## 2024-03-06 ENCOUNTER — HOSPITAL ENCOUNTER (EMERGENCY)
Facility: HOSPITAL | Age: 49
Discharge: HOME | End: 2024-03-06
Attending: EMERGENCY MEDICINE | Admitting: EMERGENCY MEDICINE
Payer: MEDICAID

## 2024-03-06 VITALS
HEIGHT: 63 IN | RESPIRATION RATE: 20 BRPM | HEART RATE: 113 BPM | TEMPERATURE: 98.7 F | OXYGEN SATURATION: 96 % | SYSTOLIC BLOOD PRESSURE: 139 MMHG | DIASTOLIC BLOOD PRESSURE: 80 MMHG | WEIGHT: 275 LBS | BODY MASS INDEX: 48.73 KG/M2

## 2024-03-06 DIAGNOSIS — K52.9 ENTERITIS: Primary | ICD-10-CM

## 2024-03-06 LAB
ALBUMIN SERPL-MCNC: 4.2 G/DL (ref 3.5–5.2)
ALBUMIN/GLOB SERPL: 1.2 G/DL
ALP SERPL-CCNC: 108 U/L (ref 39–117)
ALT SERPL W P-5'-P-CCNC: 22 U/L (ref 1–33)
ANION GAP SERPL CALCULATED.3IONS-SCNC: 13.1 MMOL/L (ref 5–15)
AST SERPL-CCNC: 16 U/L (ref 1–32)
BACTERIA UR QL AUTO: ABNORMAL /HPF
BASOPHILS # BLD AUTO: 0.03 10*3/MM3 (ref 0–0.2)
BASOPHILS NFR BLD AUTO: 0.2 % (ref 0–1.5)
BILIRUB SERPL-MCNC: 0.8 MG/DL (ref 0–1.2)
BILIRUB UR QL STRIP: ABNORMAL
BUN SERPL-MCNC: 19 MG/DL (ref 6–20)
BUN/CREAT SERPL: 19.6 (ref 7–25)
CALCIUM SPEC-SCNC: 9.4 MG/DL (ref 8.6–10.5)
CHLORIDE SERPL-SCNC: 99 MMOL/L (ref 98–107)
CLARITY UR: ABNORMAL
CO2 SERPL-SCNC: 26.9 MMOL/L (ref 22–29)
COLOR UR: ABNORMAL
CREAT SERPL-MCNC: 0.97 MG/DL (ref 0.57–1)
CRP SERPL-MCNC: 1.22 MG/DL (ref 0–0.5)
DEPRECATED RDW RBC AUTO: 41.2 FL (ref 37–54)
EGFRCR SERPLBLD CKD-EPI 2021: 71.3 ML/MIN/1.73
EOSINOPHIL # BLD AUTO: 0.25 10*3/MM3 (ref 0–0.4)
EOSINOPHIL NFR BLD AUTO: 1.9 % (ref 0.3–6.2)
ERYTHROCYTE [DISTWIDTH] IN BLOOD BY AUTOMATED COUNT: 13.4 % (ref 12.3–15.4)
GLOBULIN UR ELPH-MCNC: 3.4 GM/DL
GLUCOSE SERPL-MCNC: 142 MG/DL (ref 65–99)
GLUCOSE UR STRIP-MCNC: NEGATIVE MG/DL
HCT VFR BLD AUTO: 43.2 % (ref 34–46.6)
HGB BLD-MCNC: 14.1 G/DL (ref 12–15.9)
HGB UR QL STRIP.AUTO: NEGATIVE
HOLD SPECIMEN: NORMAL
HOLD SPECIMEN: NORMAL
HYALINE CASTS UR QL AUTO: ABNORMAL /LPF
IMM GRANULOCYTES # BLD AUTO: 0.04 10*3/MM3 (ref 0–0.05)
IMM GRANULOCYTES NFR BLD AUTO: 0.3 % (ref 0–0.5)
KETONES UR QL STRIP: ABNORMAL
LEUKOCYTE ESTERASE UR QL STRIP.AUTO: ABNORMAL
LYMPHOCYTES # BLD AUTO: 1.15 10*3/MM3 (ref 0.7–3.1)
LYMPHOCYTES NFR BLD AUTO: 8.9 % (ref 19.6–45.3)
MCH RBC QN AUTO: 27.8 PG (ref 26.6–33)
MCHC RBC AUTO-ENTMCNC: 32.6 G/DL (ref 31.5–35.7)
MCV RBC AUTO: 85.2 FL (ref 79–97)
MONOCYTES # BLD AUTO: 0.57 10*3/MM3 (ref 0.1–0.9)
MONOCYTES NFR BLD AUTO: 4.4 % (ref 5–12)
NEUTROPHILS NFR BLD AUTO: 10.88 10*3/MM3 (ref 1.7–7)
NEUTROPHILS NFR BLD AUTO: 84.3 % (ref 42.7–76)
NITRITE UR QL STRIP: NEGATIVE
NRBC BLD AUTO-RTO: 0 /100 WBC (ref 0–0.2)
PH UR STRIP.AUTO: 5.5 [PH] (ref 5–8)
PLATELET # BLD AUTO: 305 10*3/MM3 (ref 140–450)
PMV BLD AUTO: 9 FL (ref 6–12)
POTASSIUM SERPL-SCNC: 3.9 MMOL/L (ref 3.5–5.2)
PROT SERPL-MCNC: 7.6 G/DL (ref 6–8.5)
PROT UR QL STRIP: ABNORMAL
RBC # BLD AUTO: 5.07 10*6/MM3 (ref 3.77–5.28)
RBC # UR STRIP: ABNORMAL /HPF
REF LAB TEST METHOD: ABNORMAL
SODIUM SERPL-SCNC: 139 MMOL/L (ref 136–145)
SP GR UR STRIP: >1.03 (ref 1–1.03)
SQUAMOUS #/AREA URNS HPF: ABNORMAL /HPF
UROBILINOGEN UR QL STRIP: ABNORMAL
WBC # UR STRIP: ABNORMAL /HPF
WBC NRBC COR # BLD AUTO: 12.92 10*3/MM3 (ref 3.4–10.8)
WHOLE BLOOD HOLD COAG: NORMAL
WHOLE BLOOD HOLD SPECIMEN: NORMAL

## 2024-03-06 PROCEDURE — 86140 C-REACTIVE PROTEIN: CPT | Performed by: PHYSICIAN ASSISTANT

## 2024-03-06 PROCEDURE — 25010000002 ONDANSETRON PER 1 MG: Performed by: PHYSICIAN ASSISTANT

## 2024-03-06 PROCEDURE — 85025 COMPLETE CBC W/AUTO DIFF WBC: CPT | Performed by: PHYSICIAN ASSISTANT

## 2024-03-06 PROCEDURE — 81001 URINALYSIS AUTO W/SCOPE: CPT | Performed by: PHYSICIAN ASSISTANT

## 2024-03-06 PROCEDURE — 25810000003 SODIUM CHLORIDE 0.9 % SOLUTION: Performed by: PHYSICIAN ASSISTANT

## 2024-03-06 PROCEDURE — 80053 COMPREHEN METABOLIC PANEL: CPT | Performed by: PHYSICIAN ASSISTANT

## 2024-03-06 PROCEDURE — 99284 EMERGENCY DEPT VISIT MOD MDM: CPT

## 2024-03-06 PROCEDURE — 74176 CT ABD & PELVIS W/O CONTRAST: CPT | Performed by: RADIOLOGY

## 2024-03-06 PROCEDURE — 74176 CT ABD & PELVIS W/O CONTRAST: CPT

## 2024-03-06 PROCEDURE — 96374 THER/PROPH/DIAG INJ IV PUSH: CPT

## 2024-03-06 RX ORDER — DICYCLOMINE HCL 20 MG
20 TABLET ORAL EVERY 6 HOURS
Qty: 15 TABLET | Refills: 0 | Status: SHIPPED | OUTPATIENT
Start: 2024-03-06

## 2024-03-06 RX ORDER — ONDANSETRON 2 MG/ML
4 INJECTION INTRAMUSCULAR; INTRAVENOUS ONCE
Status: COMPLETED | OUTPATIENT
Start: 2024-03-06 | End: 2024-03-06

## 2024-03-06 RX ORDER — ONDANSETRON 4 MG/1
4 TABLET, ORALLY DISINTEGRATING ORAL EVERY 8 HOURS PRN
Qty: 15 TABLET | Refills: 0 | Status: SHIPPED | OUTPATIENT
Start: 2024-03-06

## 2024-03-06 RX ORDER — SODIUM CHLORIDE 0.9 % (FLUSH) 0.9 %
10 SYRINGE (ML) INJECTION AS NEEDED
Status: DISCONTINUED | OUTPATIENT
Start: 2024-03-06 | End: 2024-03-06 | Stop reason: HOSPADM

## 2024-03-06 RX ADMIN — ONDANSETRON 4 MG: 2 INJECTION INTRAMUSCULAR; INTRAVENOUS at 16:16

## 2024-03-06 RX ADMIN — SODIUM CHLORIDE 1000 ML: 9 INJECTION, SOLUTION INTRAVENOUS at 16:16

## 2024-03-06 NOTE — ED PROVIDER NOTES
Subjective   History of Present Illness  48-year-old female with past medical history of anxiety, depression, diabetes, hypercholesterolemia, pulmonary embolus, hypertension, hypothyroidism, and sleep apnea presents to the emergency room with abdominal pain which she states began last night.  Patient states she ate pizza and states pain began after eating that.  She states that she is unsure if this is related or not.  She does report nausea and 2 bouts of emesis.  She states that her stomach is boiling and usually when that happens that means diarrhea is about to start.  She denies any bouts of diarrhea at this time.  She denies any specific aggravating or alleviating factors.  Denies being around any sick contacts or travel.  Denies any other complaints or concerns at this time.    History provided by:  Patient   used: No        Review of Systems   Constitutional: Negative.  Negative for fever.   HENT: Negative.     Respiratory: Negative.     Cardiovascular: Negative.  Negative for chest pain.   Gastrointestinal:  Positive for abdominal pain, nausea and vomiting. Negative for diarrhea.   Endocrine: Negative.    Genitourinary: Negative.  Negative for dysuria.   Skin: Negative.    Neurological: Negative.    Psychiatric/Behavioral: Negative.     All other systems reviewed and are negative.      Past Medical History:   Diagnosis Date    Anxiety     Depression     Diabetes mellitus     High cholesterol     History of pulmonary embolus (PE)     Hypertension     Hypothyroidism     Sleep apnea        Allergies   Allergen Reactions    Codeine     Prozac [Fluoxetine Hcl]        Past Surgical History:   Procedure Laterality Date    CHOLECYSTECTOMY      EYE SURGERY      PULMONARY EMBOLISM SURGERY         Family History   Problem Relation Age of Onset    Stroke Mother     Diabetes Father     Heart disease Father     Hypertension Father        Social History     Socioeconomic History    Marital status:     Tobacco Use    Smoking status: Never    Smokeless tobacco: Never   Vaping Use    Vaping status: Never Used   Substance and Sexual Activity    Alcohol use: No    Drug use: No    Sexual activity: Defer           Objective   Physical Exam  Vitals and nursing note reviewed.   Constitutional:       General: She is not in acute distress.     Appearance: She is well-developed. She is not diaphoretic.   HENT:      Head: Normocephalic and atraumatic.      Right Ear: External ear normal.      Left Ear: External ear normal.      Nose: Nose normal.   Eyes:      Conjunctiva/sclera: Conjunctivae normal.      Pupils: Pupils are equal, round, and reactive to light.   Neck:      Vascular: No JVD.      Trachea: No tracheal deviation.   Cardiovascular:      Rate and Rhythm: Normal rate and regular rhythm.      Heart sounds: Normal heart sounds. No murmur heard.  Pulmonary:      Effort: Pulmonary effort is normal. No respiratory distress.      Breath sounds: Normal breath sounds. No wheezing.   Abdominal:      General: Bowel sounds are normal.      Palpations: Abdomen is soft.      Tenderness: There is no abdominal tenderness.   Musculoskeletal:         General: No deformity. Normal range of motion.      Cervical back: Normal range of motion and neck supple.   Skin:     General: Skin is warm and dry.      Coloration: Skin is not pale.      Findings: No erythema or rash.   Neurological:      Mental Status: She is alert and oriented to person, place, and time.      Cranial Nerves: No cranial nerve deficit.   Psychiatric:         Behavior: Behavior normal.         Thought Content: Thought content normal.         Procedures           ED Course  ED Course as of 03/06/24 2312   Wed Mar 06, 2024   1655 CT Abdomen Pelvis Without Contrast [TK]      ED Course User Index  [TK] Dawson Torres PA-C                                   Results for orders placed or performed during the hospital encounter of 03/06/24   Comprehensive  Metabolic Panel    Specimen: Blood   Result Value Ref Range    Glucose 142 (H) 65 - 99 mg/dL    BUN 19 6 - 20 mg/dL    Creatinine 0.97 0.57 - 1.00 mg/dL    Sodium 139 136 - 145 mmol/L    Potassium 3.9 3.5 - 5.2 mmol/L    Chloride 99 98 - 107 mmol/L    CO2 26.9 22.0 - 29.0 mmol/L    Calcium 9.4 8.6 - 10.5 mg/dL    Total Protein 7.6 6.0 - 8.5 g/dL    Albumin 4.2 3.5 - 5.2 g/dL    ALT (SGPT) 22 1 - 33 U/L    AST (SGOT) 16 1 - 32 U/L    Alkaline Phosphatase 108 39 - 117 U/L    Total Bilirubin 0.8 0.0 - 1.2 mg/dL    Globulin 3.4 gm/dL    A/G Ratio 1.2 g/dL    BUN/Creatinine Ratio 19.6 7.0 - 25.0    Anion Gap 13.1 5.0 - 15.0 mmol/L    eGFR 71.3 >60.0 mL/min/1.73   C-reactive Protein    Specimen: Blood   Result Value Ref Range    C-Reactive Protein 1.22 (H) 0.00 - 0.50 mg/dL   Urinalysis With Microscopic If Indicated (No Culture) - Urine, Clean Catch    Specimen: Urine, Clean Catch   Result Value Ref Range    Color, UA Dark Yellow (A) Yellow, Straw    Appearance, UA Cloudy (A) Clear    pH, UA 5.5 5.0 - 8.0    Specific Gravity, UA >1.030 (H) 1.005 - 1.030    Glucose, UA Negative Negative    Ketones, UA Trace (A) Negative    Bilirubin, UA Small (1+) (A) Negative    Blood, UA Negative Negative    Protein, UA 30 mg/dL (1+) (A) Negative    Leuk Esterase, UA Trace (A) Negative    Nitrite, UA Negative Negative    Urobilinogen, UA 1.0 E.U./dL 0.2 - 1.0 E.U./dL   CBC Auto Differential    Specimen: Blood   Result Value Ref Range    WBC 12.92 (H) 3.40 - 10.80 10*3/mm3    RBC 5.07 3.77 - 5.28 10*6/mm3    Hemoglobin 14.1 12.0 - 15.9 g/dL    Hematocrit 43.2 34.0 - 46.6 %    MCV 85.2 79.0 - 97.0 fL    MCH 27.8 26.6 - 33.0 pg    MCHC 32.6 31.5 - 35.7 g/dL    RDW 13.4 12.3 - 15.4 %    RDW-SD 41.2 37.0 - 54.0 fl    MPV 9.0 6.0 - 12.0 fL    Platelets 305 140 - 450 10*3/mm3    Neutrophil % 84.3 (H) 42.7 - 76.0 %    Lymphocyte % 8.9 (L) 19.6 - 45.3 %    Monocyte % 4.4 (L) 5.0 - 12.0 %    Eosinophil % 1.9 0.3 - 6.2 %    Basophil % 0.2 0.0 - 1.5  %    Immature Grans % 0.3 0.0 - 0.5 %    Neutrophils, Absolute 10.88 (H) 1.70 - 7.00 10*3/mm3    Lymphocytes, Absolute 1.15 0.70 - 3.10 10*3/mm3    Monocytes, Absolute 0.57 0.10 - 0.90 10*3/mm3    Eosinophils, Absolute 0.25 0.00 - 0.40 10*3/mm3    Basophils, Absolute 0.03 0.00 - 0.20 10*3/mm3    Immature Grans, Absolute 0.04 0.00 - 0.05 10*3/mm3    nRBC 0.0 0.0 - 0.2 /100 WBC   Urinalysis, Microscopic Only - Urine, Clean Catch    Specimen: Urine, Clean Catch   Result Value Ref Range    RBC, UA 3-5 (A) None Seen, 0-2 /HPF    WBC, UA 0-2 None Seen, 0-2 /HPF    Bacteria, UA None Seen None Seen /HPF    Squamous Epithelial Cells, UA 3-6 (A) None Seen, 0-2 /HPF    Hyaline Casts, UA 21-30 None Seen /LPF    Methodology Automated Microscopy    Green Top (Gel)   Result Value Ref Range    Extra Tube Hold for add-ons.    Lavender Top   Result Value Ref Range    Extra Tube hold for add-on    Gold Top - SST   Result Value Ref Range    Extra Tube Hold for add-ons.    Light Blue Top   Result Value Ref Range    Extra Tube Hold for add-ons.        CT Abdomen Pelvis Without Contrast   Final Result   1.  The stomach appears mildly fluid distended and there is some fluid   in the small bowel possibly representing enteritis.   2.  L3-4 bilateral facet arthropathy causes neural foraminal narrowing.           This report was finalized on 3/6/2024 4:27 PM by Dr. Franklyn Lepe MD.                        Medical Decision Making  48-year-old female with past medical history of anxiety, depression, diabetes, hypercholesterolemia, pulmonary embolus, hypertension, hypothyroidism, and sleep apnea presents to the emergency room with abdominal pain which she states began last night.  Patient states she ate pizza and states pain began after eating that.  She states that she is unsure if this is related or not.  She does report nausea and 2 bouts of emesis.  She states that her stomach is boiling and usually when that happens that means diarrhea is  about to start.  She denies any bouts of diarrhea at this time.  She denies any specific aggravating or alleviating factors.  Denies being around any sick contacts or travel.  Denies any other complaints or concerns at this time.      Problems Addressed:  Enteritis: complicated acute illness or injury    Amount and/or Complexity of Data Reviewed  Labs: ordered. Decision-making details documented in ED Course.  Radiology: ordered. Decision-making details documented in ED Course.    Risk  Prescription drug management.        Final diagnoses:   Enteritis       ED Disposition  ED Disposition       ED Disposition   Discharge    Condition   Stable    Comment   --               Benigno Verduzco PA-C  [address] [id number] [phone]    In 2 days           Medication List        New Prescriptions      dicyclomine 20 MG tablet  Commonly known as: BENTYL  Take 1 tablet by mouth Every 6 (Six) Hours.     ondansetron ODT 4 MG disintegrating tablet  Commonly known as: ZOFRAN-ODT  Place 1 tablet on the tongue Every 8 (Eight) Hours As Needed for Nausea or Vomiting.               Where to Get Your Medications        These medications were sent to [id number] - [place], [place] - [address] [address]  [facility] [address] [facility] [address] - [phone]  - [phone]   [address] [place] [address]      Phone: [phone]   dicyclomine 20 MG tablet  ondansetron ODT 4 MG disintegrating tablet            Dawson Torres PA-C  03/06/24 1900

## 2024-03-26 ENCOUNTER — HOSPITAL ENCOUNTER (EMERGENCY)
Facility: HOSPITAL | Age: 49
Discharge: HOME | End: 2024-03-26
Attending: EMERGENCY MEDICINE | Admitting: EMERGENCY MEDICINE
Payer: MEDICAID

## 2024-03-26 VITALS
DIASTOLIC BLOOD PRESSURE: 64 MMHG | BODY MASS INDEX: 46.95 KG/M2 | WEIGHT: 275 LBS | HEART RATE: 86 BPM | RESPIRATION RATE: 16 BRPM | HEIGHT: 64 IN | OXYGEN SATURATION: 96 % | TEMPERATURE: 97.7 F | SYSTOLIC BLOOD PRESSURE: 111 MMHG

## 2024-03-26 DIAGNOSIS — J06.9 VIRAL URI WITH COUGH: Primary | ICD-10-CM

## 2024-03-26 DIAGNOSIS — E86.0 DEHYDRATION: ICD-10-CM

## 2024-03-26 LAB
ALBUMIN SERPL-MCNC: 4.3 G/DL (ref 3.5–5.2)
ALBUMIN/GLOB SERPL: 1.3 G/DL
ALP SERPL-CCNC: 91 U/L (ref 39–117)
ALT SERPL W P-5'-P-CCNC: 30 U/L (ref 1–33)
ANION GAP SERPL CALCULATED.3IONS-SCNC: 12.4 MMOL/L (ref 5–15)
AST SERPL-CCNC: 21 U/L (ref 1–32)
BASOPHILS # BLD AUTO: 0.04 10*3/MM3 (ref 0–0.2)
BASOPHILS NFR BLD AUTO: 0.4 % (ref 0–1.5)
BILIRUB SERPL-MCNC: 0.8 MG/DL (ref 0–1.2)
BUN SERPL-MCNC: 16 MG/DL (ref 6–20)
BUN/CREAT SERPL: 12.8 (ref 7–25)
CALCIUM SPEC-SCNC: 9.5 MG/DL (ref 8.6–10.5)
CHLORIDE SERPL-SCNC: 99 MMOL/L (ref 98–107)
CO2 SERPL-SCNC: 25.6 MMOL/L (ref 22–29)
CREAT SERPL-MCNC: 1.25 MG/DL (ref 0.57–1)
CRP SERPL-MCNC: 5.97 MG/DL (ref 0–0.5)
DEPRECATED RDW RBC AUTO: 41.4 FL (ref 37–54)
EGFRCR SERPLBLD CKD-EPI 2021: 52.6 ML/MIN/1.73
EOSINOPHIL # BLD AUTO: 0.44 10*3/MM3 (ref 0–0.4)
EOSINOPHIL NFR BLD AUTO: 4.6 % (ref 0.3–6.2)
ERYTHROCYTE [DISTWIDTH] IN BLOOD BY AUTOMATED COUNT: 13.5 % (ref 12.3–15.4)
FLUAV RNA RESP QL NAA+PROBE: NOT DETECTED
FLUBV RNA RESP QL NAA+PROBE: NOT DETECTED
GLOBULIN UR ELPH-MCNC: 3.3 GM/DL
GLUCOSE SERPL-MCNC: 177 MG/DL (ref 65–99)
HCT VFR BLD AUTO: 41.2 % (ref 34–46.6)
HGB BLD-MCNC: 13.9 G/DL (ref 12–15.9)
IMM GRANULOCYTES # BLD AUTO: 0.02 10*3/MM3 (ref 0–0.05)
IMM GRANULOCYTES NFR BLD AUTO: 0.2 % (ref 0–0.5)
LYMPHOCYTES # BLD AUTO: 2.24 10*3/MM3 (ref 0.7–3.1)
LYMPHOCYTES NFR BLD AUTO: 23.7 % (ref 19.6–45.3)
MCH RBC QN AUTO: 28.3 PG (ref 26.6–33)
MCHC RBC AUTO-ENTMCNC: 33.7 G/DL (ref 31.5–35.7)
MCV RBC AUTO: 83.7 FL (ref 79–97)
MONOCYTES # BLD AUTO: 1.11 10*3/MM3 (ref 0.1–0.9)
MONOCYTES NFR BLD AUTO: 11.7 % (ref 5–12)
NEUTROPHILS NFR BLD AUTO: 5.62 10*3/MM3 (ref 1.7–7)
NEUTROPHILS NFR BLD AUTO: 59.4 % (ref 42.7–76)
NRBC BLD AUTO-RTO: 0 /100 WBC (ref 0–0.2)
PLATELET # BLD AUTO: 326 10*3/MM3 (ref 140–450)
PMV BLD AUTO: 8.9 FL (ref 6–12)
POTASSIUM SERPL-SCNC: 3.9 MMOL/L (ref 3.5–5.2)
PROT SERPL-MCNC: 7.6 G/DL (ref 6–8.5)
RBC # BLD AUTO: 4.92 10*6/MM3 (ref 3.77–5.28)
RSV RNA RESP QL NAA+PROBE: NOT DETECTED
SARS-COV-2 RNA RESP QL NAA+PROBE: NOT DETECTED
SODIUM SERPL-SCNC: 137 MMOL/L (ref 136–145)
WBC NRBC COR # BLD AUTO: 9.47 10*3/MM3 (ref 3.4–10.8)

## 2024-03-26 PROCEDURE — 87637 SARSCOV2&INF A&B&RSV AMP PRB: CPT | Performed by: PHYSICIAN ASSISTANT

## 2024-03-26 PROCEDURE — 86140 C-REACTIVE PROTEIN: CPT | Performed by: PHYSICIAN ASSISTANT

## 2024-03-26 PROCEDURE — 80053 COMPREHEN METABOLIC PANEL: CPT | Performed by: PHYSICIAN ASSISTANT

## 2024-03-26 PROCEDURE — 85025 COMPLETE CBC W/AUTO DIFF WBC: CPT | Performed by: PHYSICIAN ASSISTANT

## 2024-03-26 PROCEDURE — 99283 EMERGENCY DEPT VISIT LOW MDM: CPT

## 2024-03-26 PROCEDURE — 36415 COLL VENOUS BLD VENIPUNCTURE: CPT

## 2024-03-26 PROCEDURE — 25810000003 SODIUM CHLORIDE 0.9 % SOLUTION: Performed by: PHYSICIAN ASSISTANT

## 2024-03-26 RX ORDER — SODIUM CHLORIDE 0.9 % (FLUSH) 0.9 %
10 SYRINGE (ML) INJECTION AS NEEDED
Status: DISCONTINUED | OUTPATIENT
Start: 2024-03-26 | End: 2024-03-26 | Stop reason: HOSPADM

## 2024-03-26 RX ORDER — FLUTICASONE PROPIONATE 110 UG/1
1 AEROSOL, METERED RESPIRATORY (INHALATION)
Qty: 12 G | Refills: 0 | Status: SHIPPED | OUTPATIENT
Start: 2024-03-26 | End: 2024-04-02

## 2024-03-26 RX ADMIN — SODIUM CHLORIDE 1000 ML: 9 INJECTION, SOLUTION INTRAVENOUS at 15:00

## 2024-03-26 NOTE — ED PROVIDER NOTES
Subjective   History of Present Illness  This is a 48 year old female patient who presents to the ER with chief complaint of cough. PMH significant for hypothyroidism, HTN, HLD, GERD. Her son was sick not too long ago with unknown diagnosis and flu like symptoms. For the past few days, she has had dry cough, fatigue and diarrhea. Denies abdominal pain.       Review of Systems   Constitutional:  Positive for fatigue. Negative for activity change, appetite change, chills, diaphoresis, fever and unexpected weight change.   HENT: Negative.     Respiratory:  Positive for cough. Negative for apnea, choking, chest tightness, shortness of breath, wheezing and stridor.    Cardiovascular: Negative.  Negative for chest pain.   Gastrointestinal: Negative.  Positive for diarrhea. Negative for abdominal distention, abdominal pain, anal bleeding, blood in stool, constipation, nausea, rectal pain and vomiting.   Endocrine: Negative.    Genitourinary: Negative.  Negative for dysuria.   Skin: Negative.    Neurological: Negative.    Psychiatric/Behavioral: Negative.     All other systems reviewed and are negative.      Past Medical History:   Diagnosis Date    Anxiety     Depression     Diabetes mellitus     High cholesterol     History of pulmonary embolus (PE)     Hypertension     Hypothyroidism     Sleep apnea        Allergies   Allergen Reactions    Codeine     Prozac [Fluoxetine Hcl]        Past Surgical History:   Procedure Laterality Date    CHOLECYSTECTOMY      EYE SURGERY      PULMONARY EMBOLISM SURGERY         Family History   Problem Relation Age of Onset    Stroke Mother     Diabetes Father     Heart disease Father     Hypertension Father        Social History     Socioeconomic History    Marital status:    Tobacco Use    Smoking status: Never    Smokeless tobacco: Never   Vaping Use    Vaping status: Never Used   Substance and Sexual Activity    Alcohol use: No    Drug use: No    Sexual activity: Defer            Objective   Physical Exam  Vitals and nursing note reviewed.   Constitutional:       General: She is not in acute distress.     Appearance: She is well-developed. She is not diaphoretic.   HENT:      Head: Normocephalic and atraumatic.      Right Ear: External ear normal.      Left Ear: External ear normal.      Nose: Nose normal.   Eyes:      Conjunctiva/sclera: Conjunctivae normal.      Pupils: Pupils are equal, round, and reactive to light.   Neck:      Vascular: No JVD.      Trachea: No tracheal deviation.   Cardiovascular:      Rate and Rhythm: Normal rate and regular rhythm.      Heart sounds: Normal heart sounds. No murmur heard.  Pulmonary:      Effort: Pulmonary effort is normal. No respiratory distress.      Breath sounds: Normal breath sounds. No wheezing, rhonchi or rales.   Abdominal:      General: Bowel sounds are normal.      Palpations: Abdomen is soft.      Tenderness: There is no abdominal tenderness. There is no right CVA tenderness, left CVA tenderness, guarding or rebound.   Musculoskeletal:         General: No deformity. Normal range of motion.      Cervical back: Normal range of motion and neck supple.   Skin:     General: Skin is warm and dry.      Coloration: Skin is not pale.      Findings: No erythema or rash.   Neurological:      Mental Status: She is alert and oriented to person, place, and time.      Cranial Nerves: No cranial nerve deficit.   Psychiatric:         Behavior: Behavior normal.         Thought Content: Thought content normal.         Procedures       Results for orders placed or performed during the hospital encounter of 03/26/24   COVID-19, FLU A/B, RSV PCR 1 HR TAT - Swab, Nasopharynx    Specimen: Nasopharynx; Swab   Result Value Ref Range    COVID19 Not Detected Not Detected - Ref. Range    Influenza A PCR Not Detected Not Detected    Influenza B PCR Not Detected Not Detected    RSV, PCR Not Detected Not Detected   Comprehensive Metabolic Panel    Specimen:  Blood   Result Value Ref Range    Glucose 177 (H) 65 - 99 mg/dL    BUN 16 6 - 20 mg/dL    Creatinine 1.25 (H) 0.57 - 1.00 mg/dL    Sodium 137 136 - 145 mmol/L    Potassium 3.9 3.5 - 5.2 mmol/L    Chloride 99 98 - 107 mmol/L    CO2 25.6 22.0 - 29.0 mmol/L    Calcium 9.5 8.6 - 10.5 mg/dL    Total Protein 7.6 6.0 - 8.5 g/dL    Albumin 4.3 3.5 - 5.2 g/dL    ALT (SGPT) 30 1 - 33 U/L    AST (SGOT) 21 1 - 32 U/L    Alkaline Phosphatase 91 39 - 117 U/L    Total Bilirubin 0.8 0.0 - 1.2 mg/dL    Globulin 3.3 gm/dL    A/G Ratio 1.3 g/dL    BUN/Creatinine Ratio 12.8 7.0 - 25.0    Anion Gap 12.4 5.0 - 15.0 mmol/L    eGFR 52.6 (L) >60.0 mL/min/1.73   C-reactive Protein    Specimen: Blood   Result Value Ref Range    C-Reactive Protein 5.97 (H) 0.00 - 0.50 mg/dL   CBC Auto Differential    Specimen: Blood   Result Value Ref Range    WBC 9.47 3.40 - 10.80 10*3/mm3    RBC 4.92 3.77 - 5.28 10*6/mm3    Hemoglobin 13.9 12.0 - 15.9 g/dL    Hematocrit 41.2 34.0 - 46.6 %    MCV 83.7 79.0 - 97.0 fL    MCH 28.3 26.6 - 33.0 pg    MCHC 33.7 31.5 - 35.7 g/dL    RDW 13.5 12.3 - 15.4 %    RDW-SD 41.4 37.0 - 54.0 fl    MPV 8.9 6.0 - 12.0 fL    Platelets 326 140 - 450 10*3/mm3    Neutrophil % 59.4 42.7 - 76.0 %    Lymphocyte % 23.7 19.6 - 45.3 %    Monocyte % 11.7 5.0 - 12.0 %    Eosinophil % 4.6 0.3 - 6.2 %    Basophil % 0.4 0.0 - 1.5 %    Immature Grans % 0.2 0.0 - 0.5 %    Neutrophils, Absolute 5.62 1.70 - 7.00 10*3/mm3    Lymphocytes, Absolute 2.24 0.70 - 3.10 10*3/mm3    Monocytes, Absolute 1.11 (H) 0.10 - 0.90 10*3/mm3    Eosinophils, Absolute 0.44 (H) 0.00 - 0.40 10*3/mm3    Basophils, Absolute 0.04 0.00 - 0.20 10*3/mm3    Immature Grans, Absolute 0.02 0.00 - 0.05 10*3/mm3    nRBC 0.0 0.0 - 0.2 /100 WBC        ED Course  ED Course as of 03/26/24 1613   Tue Mar 26, 2024   1611 Patient diagnosed with viral URI and dehydration. Will be d/c home with rx for flovent inhaler. Will f/u with PCP in 2 days or return to ER if symptoms woresn.  [MM]       ED Course User Index  [MM] Jessica Wahl PA                                             Medical Decision Making    This is a 48 year old female patient who presents to the ER with chief complaint of cough. PMH significant for hypothyroidism, HTN, HLD, GERD. Her son was sick not too long ago with unknown diagnosis and flu like symptoms. For the past few days, she has had dry cough, fatigue and diarrhea. Denies abdominal pain.       Amount and/or Complexity of Data Reviewed  Labs: ordered. Decision-making details documented in ED Course.    Risk  Prescription drug management.        Final diagnoses:   Viral URI with cough   Dehydration       ED Disposition  ED Disposition       ED Disposition   Discharge    Condition   Stable    Comment   --               Benigno Verduzco PA-C  [address]   [address] [place] [address] [id number] [phone]    In 2 days           Medication List        New Prescriptions      fluticasone 110 MCG/ACT inhaler  Commonly known as: FLOVENT HFA  Inhale 1 puff 2 (Two) Times a Day for 7 days.               Where to Get Your Medications        These medications were sent to [id number] - [place], [place] - [address] [facility] [address] [facility] [address] - [phone]  - [phone]   [address] [facility] [address] [place] [address]      Phone: [phone]   fluticasone 110 MCG/ACT inhaler            Jessica Wahl PA  03/26/24 0114

## 2024-04-23 ENCOUNTER — TRANSCRIBE ORDERS (OUTPATIENT)
Dept: ADMINISTRATIVE | Facility: HOSPITAL | Age: 49
End: 2024-04-23
Payer: MEDICAID

## 2024-04-23 DIAGNOSIS — R74.8 ABNORMAL LEVELS OF OTHER SERUM ENZYMES: Primary | ICD-10-CM

## 2024-04-29 ENCOUNTER — HOSPITAL ENCOUNTER (OUTPATIENT)
Dept: ULTRASOUND IMAGING | Facility: HOSPITAL | Age: 49
Discharge: HOME | End: 2024-04-29
Admitting: PHYSICIAN ASSISTANT
Payer: MEDICAID

## 2024-04-29 DIAGNOSIS — R74.8 ABNORMAL LEVELS OF OTHER SERUM ENZYMES: ICD-10-CM

## 2024-04-29 PROCEDURE — 76705 ECHO EXAM OF ABDOMEN: CPT

## 2024-04-29 PROCEDURE — 76705 ECHO EXAM OF ABDOMEN: CPT | Performed by: RADIOLOGY

## 2024-07-30 ENCOUNTER — HOSPITAL ENCOUNTER (EMERGENCY)
Facility: HOSPITAL | Age: 49
Discharge: HOME | End: 2024-07-30
Attending: STUDENT IN AN ORGANIZED HEALTH CARE EDUCATION/TRAINING PROGRAM
Payer: MEDICAID

## 2024-07-30 VITALS
DIASTOLIC BLOOD PRESSURE: 89 MMHG | HEART RATE: 79 BPM | TEMPERATURE: 97.8 F | HEIGHT: 64 IN | BODY MASS INDEX: 49 KG/M2 | OXYGEN SATURATION: 95 % | WEIGHT: 287 LBS | SYSTOLIC BLOOD PRESSURE: 139 MMHG | RESPIRATION RATE: 14 BRPM

## 2024-07-30 DIAGNOSIS — F41.9 ANXIETY: Primary | ICD-10-CM

## 2024-07-30 LAB
ALBUMIN SERPL-MCNC: 4.1 G/DL (ref 3.5–5.2)
ALBUMIN/GLOB SERPL: 1.1 G/DL
ALP SERPL-CCNC: 112 U/L (ref 39–117)
ALT SERPL W P-5'-P-CCNC: 35 U/L (ref 1–33)
AMPHET+METHAMPHET UR QL: NEGATIVE
AMPHETAMINES UR QL: NEGATIVE
ANION GAP SERPL CALCULATED.3IONS-SCNC: 9.9 MMOL/L (ref 5–15)
AST SERPL-CCNC: 27 U/L (ref 1–32)
BARBITURATES UR QL SCN: NEGATIVE
BASOPHILS # BLD AUTO: 0.05 10*3/MM3 (ref 0–0.2)
BASOPHILS NFR BLD AUTO: 0.5 % (ref 0–1.5)
BENZODIAZ UR QL SCN: NEGATIVE
BILIRUB SERPL-MCNC: 0.6 MG/DL (ref 0–1.2)
BILIRUB UR QL STRIP: NEGATIVE
BUN SERPL-MCNC: 10 MG/DL (ref 6–20)
BUN/CREAT SERPL: 9.7 (ref 7–25)
BUPRENORPHINE SERPL-MCNC: NEGATIVE NG/ML
CALCIUM SPEC-SCNC: 9.6 MG/DL (ref 8.6–10.5)
CANNABINOIDS SERPL QL: NEGATIVE
CHLORIDE SERPL-SCNC: 100 MMOL/L (ref 98–107)
CLARITY UR: CLEAR
CO2 SERPL-SCNC: 27.1 MMOL/L (ref 22–29)
COCAINE UR QL: NEGATIVE
COLOR UR: YELLOW
CREAT SERPL-MCNC: 1.03 MG/DL (ref 0.57–1)
DEPRECATED RDW RBC AUTO: 40.2 FL (ref 37–54)
EGFRCR SERPLBLD CKD-EPI 2021: 66 ML/MIN/1.73
EOSINOPHIL # BLD AUTO: 0.24 10*3/MM3 (ref 0–0.4)
EOSINOPHIL NFR BLD AUTO: 2.6 % (ref 0.3–6.2)
ERYTHROCYTE [DISTWIDTH] IN BLOOD BY AUTOMATED COUNT: 12.7 % (ref 12.3–15.4)
ETHANOL BLD-MCNC: <10 MG/DL (ref 0–10)
ETHANOL UR QL: <0.01 %
FENTANYL UR-MCNC: NEGATIVE NG/ML
GLOBULIN UR ELPH-MCNC: 3.6 GM/DL
GLUCOSE SERPL-MCNC: 154 MG/DL (ref 65–99)
GLUCOSE UR STRIP-MCNC: NEGATIVE MG/DL
HCT VFR BLD AUTO: 41.5 % (ref 34–46.6)
HGB BLD-MCNC: 13.7 G/DL (ref 12–15.9)
HGB UR QL STRIP.AUTO: NEGATIVE
HOLD SPECIMEN: NORMAL
HOLD SPECIMEN: NORMAL
IMM GRANULOCYTES # BLD AUTO: 0.03 10*3/MM3 (ref 0–0.05)
IMM GRANULOCYTES NFR BLD AUTO: 0.3 % (ref 0–0.5)
KETONES UR QL STRIP: NEGATIVE
LEUKOCYTE ESTERASE UR QL STRIP.AUTO: NEGATIVE
LYMPHOCYTES # BLD AUTO: 2.56 10*3/MM3 (ref 0.7–3.1)
LYMPHOCYTES NFR BLD AUTO: 28 % (ref 19.6–45.3)
MAGNESIUM SERPL-MCNC: 2.1 MG/DL (ref 1.6–2.6)
MCH RBC QN AUTO: 28.6 PG (ref 26.6–33)
MCHC RBC AUTO-ENTMCNC: 33 G/DL (ref 31.5–35.7)
MCV RBC AUTO: 86.6 FL (ref 79–97)
METHADONE UR QL SCN: NEGATIVE
MONOCYTES # BLD AUTO: 0.59 10*3/MM3 (ref 0.1–0.9)
MONOCYTES NFR BLD AUTO: 6.4 % (ref 5–12)
NEUTROPHILS NFR BLD AUTO: 5.68 10*3/MM3 (ref 1.7–7)
NEUTROPHILS NFR BLD AUTO: 62.2 % (ref 42.7–76)
NITRITE UR QL STRIP: NEGATIVE
NRBC BLD AUTO-RTO: 0 /100 WBC (ref 0–0.2)
OPIATES UR QL: NEGATIVE
OXYCODONE UR QL SCN: NEGATIVE
PCP UR QL SCN: NEGATIVE
PH UR STRIP.AUTO: 6 [PH] (ref 5–8)
PLATELET # BLD AUTO: 338 10*3/MM3 (ref 140–450)
PMV BLD AUTO: 9 FL (ref 6–12)
POTASSIUM SERPL-SCNC: 3.8 MMOL/L (ref 3.5–5.2)
PROT SERPL-MCNC: 7.7 G/DL (ref 6–8.5)
PROT UR QL STRIP: NEGATIVE
RBC # BLD AUTO: 4.79 10*6/MM3 (ref 3.77–5.28)
SODIUM SERPL-SCNC: 137 MMOL/L (ref 136–145)
SP GR UR STRIP: 1.01 (ref 1–1.03)
TRICYCLICS UR QL SCN: NEGATIVE
TROPONIN T SERPL HS-MCNC: 8 NG/L
UROBILINOGEN UR QL STRIP: NORMAL
WBC NRBC COR # BLD AUTO: 9.15 10*3/MM3 (ref 3.4–10.8)
WHOLE BLOOD HOLD COAG: NORMAL
WHOLE BLOOD HOLD SPECIMEN: NORMAL

## 2024-07-30 PROCEDURE — 36415 COLL VENOUS BLD VENIPUNCTURE: CPT

## 2024-07-30 PROCEDURE — 84484 ASSAY OF TROPONIN QUANT: CPT

## 2024-07-30 PROCEDURE — 99284 EMERGENCY DEPT VISIT MOD MDM: CPT

## 2024-07-30 PROCEDURE — 25010000002 ONDANSETRON PER 1 MG

## 2024-07-30 PROCEDURE — 80307 DRUG TEST PRSMV CHEM ANLYZR: CPT

## 2024-07-30 PROCEDURE — 80053 COMPREHEN METABOLIC PANEL: CPT

## 2024-07-30 PROCEDURE — 81003 URINALYSIS AUTO W/O SCOPE: CPT

## 2024-07-30 PROCEDURE — 82077 ASSAY SPEC XCP UR&BREATH IA: CPT

## 2024-07-30 PROCEDURE — 83735 ASSAY OF MAGNESIUM: CPT

## 2024-07-30 PROCEDURE — 85025 COMPLETE CBC W/AUTO DIFF WBC: CPT

## 2024-07-30 PROCEDURE — 25810000003 SODIUM CHLORIDE 0.9 % SOLUTION

## 2024-07-30 PROCEDURE — 96374 THER/PROPH/DIAG INJ IV PUSH: CPT

## 2024-07-30 RX ORDER — SODIUM CHLORIDE 0.9 % (FLUSH) 0.9 %
10 SYRINGE (ML) INJECTION AS NEEDED
Status: DISCONTINUED | OUTPATIENT
Start: 2024-07-30 | End: 2024-07-30 | Stop reason: HOSPADM

## 2024-07-30 RX ORDER — ONDANSETRON 2 MG/ML
4 INJECTION INTRAMUSCULAR; INTRAVENOUS ONCE
Status: COMPLETED | OUTPATIENT
Start: 2024-07-30 | End: 2024-07-30

## 2024-07-30 RX ORDER — HYDROXYZINE HYDROCHLORIDE 25 MG/1
25 TABLET, FILM COATED ORAL ONCE
Status: COMPLETED | OUTPATIENT
Start: 2024-07-30 | End: 2024-07-30

## 2024-07-30 RX ADMIN — HYDROXYZINE HYDROCHLORIDE 25 MG: 25 TABLET, FILM COATED ORAL at 15:17

## 2024-07-30 RX ADMIN — ONDANSETRON 4 MG: 2 INJECTION INTRAMUSCULAR; INTRAVENOUS at 15:20

## 2024-07-30 RX ADMIN — SODIUM CHLORIDE 500 ML: 9 INJECTION, SOLUTION INTRAVENOUS at 15:18

## 2024-07-30 NOTE — ED PROVIDER NOTES
Subjective   History of Present Illness  Patient is a 49-year-old female with a past medical history of anxiety, depression, diabetes, hyperlipidemia, hypertension, and thyroid disease.  Patient presents with complaints of anxiety and hypertension.  Patient reports that she took a new medication this morning for her anxiety (Auvelity) and after she had 2 cups of coffee in her morning breakfast she planned to drive to the store when she became very anxious and nervous, patient reports when she checked her blood pressure she noted it to be 131/103 and 156/97.  Patient denies any headache, dizziness, lightheadedness, or any vomiting.  Patient denies any chest pain/pressure/tightness or any shortness of breath.  Patient reports that she was however slightly nauseated during this episode.  Patient is normotensive upon arrival to the ER here.  However patient does appear very anxious upon arrival.  Patient denies any suicidal ideation, homicidal ideation, or any auditory/visual hallucinations.  Patient is alert and oriented able to answer all questions appropriately.  Patient presents EMS from her home.        Review of Systems   Constitutional: Negative.  Negative for fever.   HENT: Negative.     Respiratory: Negative.     Cardiovascular: Negative.  Negative for chest pain.   Gastrointestinal: Negative.  Negative for abdominal pain.   Endocrine: Negative.    Genitourinary: Negative.  Negative for dysuria.   Skin: Negative.    Neurological: Negative.    Psychiatric/Behavioral:  The patient is nervous/anxious.    All other systems reviewed and are negative.      Past Medical History:   Diagnosis Date    Anxiety     Depression     Diabetes mellitus     High cholesterol     History of pulmonary embolus (PE)     Hypertension     Hypothyroidism     Sleep apnea        Allergies   Allergen Reactions    Codeine     Prozac [Fluoxetine Hcl]        Past Surgical History:   Procedure Laterality Date    CHOLECYSTECTOMY      EYE  SURGERY      PULMONARY EMBOLISM SURGERY         Family History   Problem Relation Age of Onset    Stroke Mother     Diabetes Father     Heart disease Father     Hypertension Father        Social History     Socioeconomic History    Marital status:    Tobacco Use    Smoking status: Never    Smokeless tobacco: Never   Vaping Use    Vaping status: Never Used   Substance and Sexual Activity    Alcohol use: No    Drug use: No    Sexual activity: Not Currently     Partners: Male     Birth control/protection: None     Comment: denies           Objective   Physical Exam  Vitals and nursing note reviewed.   Constitutional:       General: She is not in acute distress.     Appearance: She is well-developed. She is not diaphoretic.   HENT:      Head: Normocephalic and atraumatic.      Right Ear: External ear normal.      Left Ear: External ear normal.      Nose: Nose normal.   Eyes:      Conjunctiva/sclera: Conjunctivae normal.      Pupils: Pupils are equal, round, and reactive to light.   Neck:      Vascular: No JVD.      Trachea: No tracheal deviation.   Cardiovascular:      Rate and Rhythm: Normal rate and regular rhythm.      Heart sounds: Normal heart sounds. No murmur heard.  Pulmonary:      Effort: Pulmonary effort is normal. No respiratory distress.      Breath sounds: Normal breath sounds. No wheezing.   Abdominal:      General: Bowel sounds are normal.      Palpations: Abdomen is soft.      Tenderness: There is no abdominal tenderness.   Musculoskeletal:         General: No deformity. Normal range of motion.      Cervical back: Normal range of motion and neck supple.   Skin:     General: Skin is warm and dry.      Coloration: Skin is not pale.      Findings: No erythema or rash.   Neurological:      Mental Status: She is alert and oriented to person, place, and time.      Cranial Nerves: No cranial nerve deficit.   Psychiatric:         Mood and Affect: Mood is anxious and depressed. Affect is flat.          Behavior: Behavior normal.         Thought Content: Thought content normal.         Procedures           ED Course  ED Course as of 07/30/24 2149   Tue Jul 30, 2024   3205 Spoke to IRAIS Mandujano in intake about patient coming to be evaluated for her anxiety and depression [KH]   1551 Patient currently being evaluated by IRAIS Dumont in patient's room. [KH]      ED Course User Index  [KH] MarcelinoHoda, MARTHA                                             Medical Decision Making  Patient is a 49-year-old female with a past medical history of anxiety, depression, diabetes, hyperlipidemia, hypertension, and thyroid disease.  Patient presents with complaints of anxiety and hypertension.  Patient reports that she took a new medication this morning for her anxiety (Auvelity) and after she had 2 cups of coffee in her morning breakfast she planned to drive to the store when she became very anxious and nervous, patient reports when she checked her blood pressure she noted it to be 131/103 and 156/97.  Patient denies any headache, dizziness, lightheadedness, or any vomiting.  Patient denies any chest pain/pressure/tightness or any shortness of breath.  Patient reports that she was however slightly nauseated during this episode.  Patient is normotensive upon arrival to the ER here.  However patient does appear very anxious upon arrival.  Patient denies any suicidal ideation, homicidal ideation, or any auditory/visual hallucinations.  Patient is alert and oriented able to answer all questions appropriately.  Patient presents EMS from her home.    Problems Addressed:  Anxiety: complicated acute illness or injury    Amount and/or Complexity of Data Reviewed  Labs: ordered.    Risk  Prescription drug management.        Final diagnoses:   Anxiety       ED Disposition  ED Disposition       ED Disposition   Discharge    Condition   Stable    Comment   --               Benigno Verduzco PA-C  [address]   [address]  [facility]  [address]  [phone]    Schedule an appointment as soon as possible for a visit   As needed    [facility] EMERGENCY DEPARTMENT  [address] [facility] [id number] [phone]  Go to   If symptoms worsen         Medication List      No changes were made to your prescriptions during this visit.            Hoda Benson, APRN  07/30/24 8470

## 2024-07-30 NOTE — NURSING NOTE
ER provider Latonya requested intake to speak with patient in ER2. Instructed that she wanted to talk to someone from psych. Hx of anxiety    Patient seen in the ER for anxiety and high blood pressure. Pt has history of both    She reports that she just wanted to talk to someone. She reports a long hx of problems with anxiety and depression and states that her anxiety has been pretty high the past few weeks. She also reports doing a phone visit with her provider Henrique Farzana and states that he started her on a new medication Auvetly. She states that she has not had no side effects from it but got scared to take a new medicine and was not sure if she should or should not take it. She states that he just started her on it and she took her first dose today. Staff asked patient if she had voiced her fear to Mr. Yanes and she states Yes and he told me it would be fine and to take it and it would help.     Patient states that she just wanted some reassurance just cause she has been so anxious.    Patient denies any use of etoh or drugs    She denies any SI or HI and states that she has never been suicidal just anxious.

## 2024-07-30 NOTE — NURSING NOTE
Spoke to  via phone. Intake information and ER visit reviewed. Instructed that he would suggest she contact Henrique Yanes and discuss her fears with him and he does not recommend any changes. Instruct her to follow up with her provider. [email or web address]

## 2024-09-10 ENCOUNTER — OFFICE VISIT (OUTPATIENT)
Dept: SURGERY | Facility: CLINIC | Age: 49
End: 2024-09-10
Payer: MEDICAID

## 2024-09-10 VITALS
HEIGHT: 64 IN | WEIGHT: 276 LBS | DIASTOLIC BLOOD PRESSURE: 62 MMHG | BODY MASS INDEX: 47.12 KG/M2 | SYSTOLIC BLOOD PRESSURE: 112 MMHG

## 2024-09-10 DIAGNOSIS — K64.4 EXTERNAL HEMORRHOIDS: Primary | ICD-10-CM

## 2024-09-10 PROCEDURE — 1160F RVW MEDS BY RX/DR IN RCRD: CPT

## 2024-09-10 PROCEDURE — 1159F MED LIST DOCD IN RCRD: CPT

## 2024-09-10 PROCEDURE — 3078F DIAST BP <80 MM HG: CPT

## 2024-09-10 PROCEDURE — 3074F SYST BP LT 130 MM HG: CPT

## 2024-09-10 PROCEDURE — 99213 OFFICE O/P EST LOW 20 MIN: CPT

## 2024-09-10 RX ORDER — HYDROXYZINE HYDROCHLORIDE 10 MG/1
1 TABLET, FILM COATED ORAL 3 TIMES DAILY
COMMUNITY
Start: 2024-08-06

## 2024-09-10 RX ORDER — AMLODIPINE BESYLATE 2.5 MG/1
1 TABLET ORAL DAILY
COMMUNITY
Start: 2024-06-13

## 2024-09-10 RX ORDER — LISINOPRIL 40 MG/1
40 TABLET ORAL DAILY
COMMUNITY

## 2024-09-10 NOTE — PROGRESS NOTES
Subjective   Arabella Rose is a 49 y.o. female who presents today for Initial Evaluation    Chief Complaint:    Chief Complaint   Patient presents with    Hemorrhoids        History of Present Illness:    History of Present Illness Arabella is a 49-year-old female who presents for evaluation for external hemorrhoids.  She reports that she is having for several years.  Reports that occasionally they will swell and cause her to have pain.  She does report that she will occasionally see blood when she wipes when she is having a flareup.  She has never had a colonoscopy in the past.  At this time, she states that she is uninterested in having a colonoscopy.  Denies any unintentional weight loss or any changes in her bowel habits.  Does report occasional constipation.  However, for the most part she has soft and loose stools as she reports she has had a cholecystectomy in the past.  Denies any known family history of colon cancer.    The following portions of the patient's history were reviewed and updated as appropriate: allergies, current medications, past family history, past medical history, past social history, past surgical history and problem list.    Past Medical History:  Past Medical History:   Diagnosis Date    Anxiety     Depression     Diabetes mellitus     High cholesterol     History of pulmonary embolus (PE)     Hypertension     Hypothyroidism     Sleep apnea        Social History:  Social History     Socioeconomic History    Marital status:    Tobacco Use    Smoking status: Never     Passive exposure: Never    Smokeless tobacco: Never   Vaping Use    Vaping status: Never Used   Substance and Sexual Activity    Alcohol use: No    Drug use: No    Sexual activity: Not Currently     Partners: Male     Birth control/protection: None     Comment: denies       Family History:  Family History   Problem Relation Age of Onset    Stroke Mother     Diabetes Father     Heart disease Father      Hypertension Father        Past Surgical History:  Past Surgical History:   Procedure Laterality Date    CHOLECYSTECTOMY      EYE SURGERY      PULMONARY EMBOLISM SURGERY         Problem List:  Patient Active Problem List   Diagnosis    Chest pain    Type 2 diabetes mellitus without complication, without long-term current use of insulin    History of cardiac murmur    Hyperlipidemia    Hypertension    Morbid obesity with BMI of 45.0-49.9, adult    JESÚS (acute kidney injury)       Allergy:   Allergies   Allergen Reactions    Codeine     Prozac [Fluoxetine Hcl]         Current Medications:   Current Outpatient Medications   Medication Sig Dispense Refill    amLODIPine (NORVASC) 2.5 MG tablet Take 1 tablet by mouth Daily.      atorvastatin (LIPITOR) 40 MG tablet Take 1 tablet by mouth Daily.      hydrOXYzine (ATARAX) 10 MG tablet Take 1 tablet by mouth 3 times a day.      levothyroxine (SYNTHROID, LEVOTHROID) 75 MCG tablet Take 1 tablet by mouth Daily.      lisinopril (PRINIVIL,ZESTRIL) 40 MG tablet Take 1 tablet by mouth Daily.      metFORMIN (GLUCOPHAGE) 500 MG tablet Take 1 tablet by mouth Every 12 (Twelve) Hours.      metoprolol succinate XL (TOPROL-XL) 50 MG 24 hr tablet Take 1 tablet by mouth Daily.      omeprazole (priLOSEC) 20 MG capsule Take 1 capsule by mouth Daily.      pramipexole (MIRAPEX) 0.25 MG tablet Take 1 tablet by mouth Every Night.      lisinopril (PRINIVIL,ZESTRIL) 40 MG tablet Take 0.5 tablets by mouth Daily for 30 days. 15 tablet 0     No current facility-administered medications for this visit.       Review of Systems:    Review of Systems   Gastrointestinal:  Positive for anal bleeding (Ocassional).         Physical Exam:   Physical Exam  Exam conducted with a chaperone present (KINJAL Oliver).   Constitutional:       Appearance: Normal appearance.   HENT:      Head: Normocephalic and atraumatic.      Right Ear: External ear normal.      Left Ear: External ear normal.   Eyes:       "Conjunctiva/sclera: Conjunctivae normal.   Pulmonary:      Effort: Pulmonary effort is normal.   Abdominal:      General: Abdomen is flat.      Palpations: Abdomen is soft.   Genitourinary:     Comments: External hemorrhoids noted. No bleeding   Musculoskeletal:         General: Normal range of motion.      Cervical back: Normal range of motion and neck supple.   Skin:     General: Skin is warm and dry.      Capillary Refill: Capillary refill takes less than 2 seconds.   Neurological:      General: No focal deficit present.      Mental Status: She is alert and oriented to person, place, and time.   Psychiatric:         Mood and Affect: Mood normal.         Behavior: Behavior normal.         Vitals:  Blood pressure 112/62, height 162.6 cm (64.02\"), weight 125 kg (276 lb).   Body mass index is 47.35 kg/m².       Assessment & Plan   Diagnoses and all orders for this visit:    1. External hemorrhoids (Primary)  -     Case Request; Standing  -     Case Request    Other orders  -     Follow Anesthesia Guidelines / Protocol; Future  -     Follow Anesthesia Guidelines / Protocol; Standing  -     Verify / Perform Chlorhexidine Skin Prep; Standing  -     Obtain Informed Consent; Future  -     Provide NPO Instructions to Patient; Future  -     Chlorhexidine Skin Prep; Future  -     Place Sequential Compression Device; Standing  -     Maintain Sequential Compression Device; Standing    Arabella is a 49-year-old female presents with external hemorrhoids.  She wishes to undergo hemorrhoidectomy.  She will undergo hemorrhoidectomy with Dr. RAMIREZ  Verbalized understanding of prep instructions and procedure wished to proceed.    Visit Diagnoses:    ICD-10-CM ICD-9-CM   1. External hemorrhoids  K64.4 455.3         MEDS ORDERED DURING VISIT:  No orders of the defined types were placed in this encounter.      Return for Follwo up post-op.             This document has been electronically signed by Raji Atkins, APRERWIN  September 10, " 2024 13:35 EDT    Please note that portions of this note were completed with a voice recognition program.

## 2024-09-11 ENCOUNTER — TELEPHONE (OUTPATIENT)
Age: 49
End: 2024-09-11
Payer: MEDICAID

## 2024-09-11 NOTE — TELEPHONE ENCOUNTER
Called patient to get her scheduled for Hemorrhoidectomy. Patient denies scheduling at this time due to lack of transportation from hospital. She will call back when she is ready to schedule.

## 2024-09-16 ENCOUNTER — OFFICE VISIT (OUTPATIENT)
Dept: PULMONOLOGY | Facility: CLINIC | Age: 49
End: 2024-09-16
Payer: MEDICAID

## 2024-09-16 VITALS
WEIGHT: 276 LBS | BODY MASS INDEX: 47.12 KG/M2 | HEART RATE: 78 BPM | DIASTOLIC BLOOD PRESSURE: 78 MMHG | SYSTOLIC BLOOD PRESSURE: 126 MMHG | TEMPERATURE: 96.9 F | OXYGEN SATURATION: 98 % | HEIGHT: 64 IN

## 2024-09-16 DIAGNOSIS — G25.81 RESTLESS LEGS SYNDROME (RLS): ICD-10-CM

## 2024-09-16 DIAGNOSIS — R06.02 SHORTNESS OF BREATH: ICD-10-CM

## 2024-09-16 DIAGNOSIS — E66.01 MORBID OBESITY WITH BMI OF 45.0-49.9, ADULT: ICD-10-CM

## 2024-09-16 DIAGNOSIS — G47.33 OSA (OBSTRUCTIVE SLEEP APNEA): Primary | ICD-10-CM

## 2024-09-16 PROCEDURE — 1160F RVW MEDS BY RX/DR IN RCRD: CPT | Performed by: PHYSICIAN ASSISTANT

## 2024-09-16 PROCEDURE — 1159F MED LIST DOCD IN RCRD: CPT | Performed by: PHYSICIAN ASSISTANT

## 2024-09-16 PROCEDURE — 3078F DIAST BP <80 MM HG: CPT | Performed by: PHYSICIAN ASSISTANT

## 2024-09-16 PROCEDURE — 3074F SYST BP LT 130 MM HG: CPT | Performed by: PHYSICIAN ASSISTANT

## 2024-09-16 PROCEDURE — 99204 OFFICE O/P NEW MOD 45 MIN: CPT | Performed by: PHYSICIAN ASSISTANT

## 2024-09-20 ENCOUNTER — PATIENT ROUNDING (BHMG ONLY) (OUTPATIENT)
Dept: PULMONOLOGY | Facility: CLINIC | Age: 49
End: 2024-09-20
Payer: MEDICAID

## 2024-10-09 ENCOUNTER — TELEPHONE (OUTPATIENT)
Age: 49
End: 2024-10-09
Payer: MEDICAID

## 2024-10-09 DIAGNOSIS — G47.33 OSA (OBSTRUCTIVE SLEEP APNEA): Primary | ICD-10-CM

## 2024-10-17 ENCOUNTER — OFFICE VISIT (OUTPATIENT)
Dept: PULMONOLOGY | Facility: CLINIC | Age: 49
End: 2024-10-17
Payer: MEDICAID

## 2024-10-17 VITALS
HEART RATE: 75 BPM | OXYGEN SATURATION: 98 % | HEIGHT: 64 IN | TEMPERATURE: 96.7 F | DIASTOLIC BLOOD PRESSURE: 68 MMHG | SYSTOLIC BLOOD PRESSURE: 128 MMHG | WEIGHT: 281.6 LBS | BODY MASS INDEX: 48.07 KG/M2

## 2024-10-17 DIAGNOSIS — Z23 NEED FOR VACCINATION: ICD-10-CM

## 2024-10-17 DIAGNOSIS — G47.33 OSA (OBSTRUCTIVE SLEEP APNEA): Primary | ICD-10-CM

## 2024-10-17 DIAGNOSIS — E66.01 MORBID OBESITY WITH BMI OF 45.0-49.9, ADULT: ICD-10-CM

## 2024-10-17 DIAGNOSIS — R06.02 SHORTNESS OF BREATH: ICD-10-CM

## 2024-10-17 NOTE — PROGRESS NOTES
Subjective      Chief Complaint  Sleep Apnea    Subjective      History of Present Illness  Arabella Rose is a 49 y.o. female who presents today to [facility] with past medical history of essential hypertension, hyperlipidemia, type II diabetes mellitus, hypothyroidism, anxiety.depression, GERD, RLS, and GABBI who presents today for Sleep Apnea. This visit is a follow up appointment.     Sleep Apnea:  Patient presents today for her follow-up appointment for sleep apnea. She states that she is doing well. Previously contacted her to discuss compliance report and hesitant to further decrease settings due to possibility of AHI increasing further. Discussed ordering a titration study and patient was agreeable. She missed call from [facility] sleep lab to schedule appointment and states that she has to call them back to set it up. She was also contacted by [facility] for different size mask and not eligible for one currently but states that she will be getting one when she is ready for replacement. She is scheduled for PFT on 10/22/24 for further evaluation of exertional dyspnea and wheezing.      Current Outpatient Medications:     amLODIPine (NORVASC) 2.5 MG tablet, Take 1 tablet by mouth Daily., Disp: , Rfl:     atorvastatin (LIPITOR) 40 MG tablet, Take 1 tablet by mouth Daily., Disp: , Rfl:     hydrOXYzine (ATARAX) 10 MG tablet, Take 1 tablet by mouth 3 times a day., Disp: , Rfl:     levothyroxine (SYNTHROID, LEVOTHROID) 75 MCG tablet, Take 1 tablet by mouth Daily., Disp: , Rfl:     lisinopril (PRINIVIL,ZESTRIL) 40 MG tablet, Take 1 tablet by mouth Daily., Disp: , Rfl:     metFORMIN (GLUCOPHAGE) 500 MG tablet, Take 1 tablet by mouth Every 12 (Twelve) Hours., Disp: , Rfl:     metoprolol succinate XL (TOPROL-XL) 50 MG 24 hr tablet, Take 1 tablet by mouth Daily., Disp: , Rfl:     omeprazole (priLOSEC) 20 MG capsule, Take 1 capsule by mouth Daily., Disp: , Rfl:      "pramipexole (MIRAPEX) 0.25 MG tablet, Take 1 tablet by mouth Every Night., Disp: , Rfl:     lisinopril (PRINIVIL,ZESTRIL) 40 MG tablet, Take 0.5 tablets by mouth Daily for 30 days., Disp: 15 tablet, Rfl: 0      Allergies   Allergen Reactions    Codeine     Prozac [Fluoxetine Hcl]        Objective     Objective   Vital Signs:  /68   Pulse 75   Temp 96.7 °F (35.9 °C)   Ht 162.6 cm (64.02\")   Wt 128 kg (281 lb 9.6 oz)   SpO2 98%   BMI 48.31 kg/m²   Estimated body mass index is 48.31 kg/m² as calculated from the following:    Height as of this encounter: 162.6 cm (64.02\").    Weight as of this encounter: 128 kg (281 lb 9.6 oz).          Past Medical History:   Diagnosis Date    Anxiety     Depression     Diabetes mellitus     High cholesterol     History of pulmonary embolus (PE)     Hypertension     Hypothyroidism     Sleep apnea      Past Surgical History:   Procedure Laterality Date    CHOLECYSTECTOMY      EYE SURGERY      PULMONARY EMBOLISM SURGERY       Social History     Socioeconomic History    Marital status:    Tobacco Use    Smoking status: Never     Passive exposure: Never    Smokeless tobacco: Never   Vaping Use    Vaping status: Never Used   Substance and Sexual Activity    Alcohol use: No    Drug use: No    Sexual activity: Not Currently     Partners: Male     Birth control/protection: None     Comment: denies        Physical Exam  Constitutional:       General: She is awake.      Appearance: Normal appearance. She is morbidly obese.   HENT:      Head: Normocephalic and atraumatic.      Nose: Nose normal.      Mouth/Throat:      Mouth: Mucous membranes are moist.      Pharynx: Oropharynx is clear.   Eyes:      Conjunctiva/sclera: Conjunctivae normal.      Pupils: Pupils are equal, round, and reactive to light.   Cardiovascular:      Rate and Rhythm: Normal rate and regular rhythm.      Pulses: Normal pulses.      Heart sounds: Normal heart sounds. No murmur heard.     No friction rub. " "No gallop.   Pulmonary:      Effort: Pulmonary effort is normal. No tachypnea, accessory muscle usage or respiratory distress.      Breath sounds: Normal breath sounds. No decreased breath sounds, wheezing, rhonchi or rales.   Musculoskeletal:         General: Normal range of motion.      Cervical back: Full passive range of motion without pain and normal range of motion.   Skin:     General: Skin is warm and dry.   Neurological:      General: No focal deficit present.      Mental Status: She is alert. Mental status is at baseline.   Psychiatric:         Mood and Affect: Mood normal.         Behavior: Behavior normal. Behavior is cooperative.         Thought Content: Thought content normal.          Result Review :  The following labs and radiology results have been reviewed.    Lab Review:   No results found for: \"FEV1\", \"FVC\", \"UEM1BCF\", \"TLC\", \"DLCO\"  Admission on 07/30/2024, Discharged on 07/30/2024   Component Date Value Ref Range Status    Glucose 07/30/2024 154 (H)  65 - 99 mg/dL Final    BUN 07/30/2024 10  6 - 20 mg/dL Final    Creatinine 07/30/2024 1.03 (H)  0.57 - 1.00 mg/dL Final    Sodium 07/30/2024 137  136 - 145 mmol/L Final    Potassium 07/30/2024 3.8  3.5 - 5.2 mmol/L Final    Chloride 07/30/2024 100  98 - 107 mmol/L Final    CO2 07/30/2024 27.1  22.0 - 29.0 mmol/L Final    Calcium 07/30/2024 9.6  8.6 - 10.5 mg/dL Final    Total Protein 07/30/2024 7.7  6.0 - 8.5 g/dL Final    Albumin 07/30/2024 4.1  3.5 - 5.2 g/dL Final    ALT (SGPT) 07/30/2024 35 (H)  1 - 33 U/L Final    AST (SGOT) 07/30/2024 27  1 - 32 U/L Final    Alkaline Phosphatase 07/30/2024 112  39 - 117 U/L Final    Total Bilirubin 07/30/2024 0.6  0.0 - 1.2 mg/dL Final    Globulin 07/30/2024 3.6  gm/dL Final    A/G Ratio 07/30/2024 1.1  g/dL Final    BUN/Creatinine Ratio 07/30/2024 9.7  7.0 - 25.0 Final    Anion Gap 07/30/2024 9.9  5.0 - 15.0 mmol/L Final    eGFR 07/30/2024 66.0  >60.0 mL/min/1.73 Final    HS Troponin T 07/30/2024 8  <14 " ng/L Final    Magnesium 07/30/2024 2.1  1.6 - 2.6 mg/dL Final    THC, Screen, Urine 07/30/2024 Negative  Negative Final    Phencyclidine (PCP), Urine 07/30/2024 Negative  Negative Final    Cocaine Screen, Urine 07/30/2024 Negative  Negative Final    Methamphetamine, Ur 07/30/2024 Negative  Negative Final    Opiate Screen 07/30/2024 Negative  Negative Final    Amphetamine Screen, Urine 07/30/2024 Negative  Negative Final    Benzodiazepine Screen, Urine 07/30/2024 Negative  Negative Final    Tricyclic Antidepressants Screen 07/30/2024 Negative  Negative Final    Methadone Screen, Urine 07/30/2024 Negative  Negative Final    Barbiturates Screen, Urine 07/30/2024 Negative  Negative Final    Oxycodone Screen, Urine 07/30/2024 Negative  Negative Final    Buprenorphine, Screen, Urine 07/30/2024 Negative  Negative Final    Ethanol 07/30/2024 <10  0 - 10 mg/dL Final    Ethanol % 07/30/2024 <0.010  % Final    Color, UA 07/30/2024 Yellow  Yellow, Straw Final    Appearance, UA 07/30/2024 Clear  Clear Final    pH, UA 07/30/2024 6.0  5.0 - 8.0 Final    Specific Gravity, UA 07/30/2024 1.010  1.005 - 1.030 Final    Glucose, UA 07/30/2024 Negative  Negative Final    Ketones, UA 07/30/2024 Negative  Negative Final    Bilirubin, UA 07/30/2024 Negative  Negative Final    Blood, UA 07/30/2024 Negative  Negative Final    Protein, UA 07/30/2024 Negative  Negative Final    Leuk Esterase, UA 07/30/2024 Negative  Negative Final    Nitrite, UA 07/30/2024 Negative  Negative Final    Urobilinogen, UA 07/30/2024 0.2 E.U./dL  0.2 - 1.0 E.U./dL Final    WBC 07/30/2024 9.15  3.40 - 10.80 10*3/mm3 Final    RBC 07/30/2024 4.79  3.77 - 5.28 10*6/mm3 Final    Hemoglobin 07/30/2024 13.7  12.0 - 15.9 g/dL Final    Hematocrit 07/30/2024 41.5  34.0 - 46.6 % Final    MCV 07/30/2024 86.6  79.0 - 97.0 fL Final    MCH 07/30/2024 28.6  26.6 - 33.0 pg Final    MCHC 07/30/2024 33.0  31.5 - 35.7 g/dL Final    RDW 07/30/2024 12.7  12.3 - 15.4 % Final    RDW-SD  07/30/2024 40.2  37.0 - 54.0 fl Final    MPV 07/30/2024 9.0  6.0 - 12.0 fL Final    Platelets 07/30/2024 338  140 - 450 10*3/mm3 Final    Neutrophil % 07/30/2024 62.2  42.7 - 76.0 % Final    Lymphocyte % 07/30/2024 28.0  19.6 - 45.3 % Final    Monocyte % 07/30/2024 6.4  5.0 - 12.0 % Final    Eosinophil % 07/30/2024 2.6  0.3 - 6.2 % Final    Basophil % 07/30/2024 0.5  0.0 - 1.5 % Final    Immature Grans % 07/30/2024 0.3  0.0 - 0.5 % Final    Neutrophils, Absolute 07/30/2024 5.68  1.70 - 7.00 10*3/mm3 Final    Lymphocytes, Absolute 07/30/2024 2.56  0.70 - 3.10 10*3/mm3 Final    Monocytes, Absolute 07/30/2024 0.59  0.10 - 0.90 10*3/mm3 Final    Eosinophils, Absolute 07/30/2024 0.24  0.00 - 0.40 10*3/mm3 Final    Basophils, Absolute 07/30/2024 0.05  0.00 - 0.20 10*3/mm3 Final    Immature Grans, Absolute 07/30/2024 0.03  0.00 - 0.05 10*3/mm3 Final    nRBC 07/30/2024 0.0  0.0 - 0.2 /100 WBC Final    Extra Tube 07/30/2024 Hold for add-ons.   Final    Auto resulted.    Extra Tube 07/30/2024 hold for add-on   Final    Auto resulted    Extra Tube 07/30/2024 Hold for add-ons.   Final    Auto resulted.    Extra Tube 07/30/2024 Hold for add-ons.   Final    Auto resulted    Fentanyl, Urine 07/30/2024 Negative  Negative Final        Imaging Results (Most Recent)       None            Radiology Review:   Imaging Results (Last 72 Hours)       ** No results found for the last 72 hours. **          Reviewed CT chest PE protocol from 02/2022  Narrative & Impression   CT CHEST PULMONARY EMBOLISM W CONTRAST     INDICATION:   Elevated d-dimer. Clinical suspicion for PE.     TECHNIQUE:   CT angiogram of the chest with IV contrast. 3-D reconstructions were obtained and reviewed.   Radiation dose reduction techniques included automated exposure control or exposure modulation based on body size. Count of known CT and cardiac nuc med studies  performed in previous 12 months: 0.      COMPARISON:   2/27/2017     FINDINGS:   There is no pleural  effusion or evidence of pneumonia. Mild nonspecific mosaic attenuation of the lung parenchyma suggestive of underlying air trapping disease. No suspicious pulmonary nodule. The central airways are patent. There is no pericardial  effusion. No threshold adenopathy. Included upper abdomen shows surgical absence of the gallbladder.     Normal caliber aorta and a dissection. Independent origin of the left vertebral artery from the aortic arch. No PE. There is no suspicious bone lesion.  negative.     IMPRESSION:     1. No aortic aneurysm or dissection.  2. No PE.  3. There is no pleural effusion or pneumonia.  4. Gallbladder surgically absent.     Signer Name: Blane Ellington MD   Signed: 2/14/2022 10:12 PM   Workstation Name: [facility]    [facility]       Assessment / Plan         Assessment   Diagnoses and all orders for this visit:    1. GABBI (obstructive sleep apnea) (Primary)  2. Morbid obesity with BMI of 45.0-49.9, adult  Currently has CPAP device but having difficulty tolerating due to fit of mask (feels that it is too small) and pressure settings.   Discussed with representative about fit of mask and patient currently doesn't qualify for replacement at this time but will be receiving new size next time she is due.   Reviewed compliance report with patient and hesitant to further adjust settings due to concerns for worsening AHI. Has been referred to [facility] for titration study and patient to return call to sleep lab to set this up.     Management obstructive sleep apnea also includes lifestyle modifications including weight loss, avoidance of alcohol, sedated, caffeine especially before bedtime, allowing adequate sleep time, and body position during sleep such as side versus back. 10% weight loss can result in a 50% improvement of the apnea-hypopnea index. Untreated sleep apnea can lead to cardiovascular/metabolic disorder, neurocognitive deficit, daytime sleepiness, motor vehicle  accidents, depression, mood disorders and reduced quality of life.  Patient understands the risk of untreated obstructive sleep apnea and benefit benefits of the treatment. Recommended at least 4 hours of use per night for 70% of every month (approximately 21 out of 30 days) per CMS guidelines.      3. Shortness of breath  Reports of shortness of breath and wheezing with exertion.   No previous smoking history reported.   Scheduled for PFT on 10/22/2024.     4. Need for vaccination  Received flu vaccination during visit.     -     Fluzone >6mos ([id number])      There are no discontinued medications.     No orders of the defined types were placed in this encounter.    I spent 30 minutes caring for Arabella on this date of service. This time includes time spent by me in the following activities:preparing for the visit, reviewing tests, obtaining and/or reviewing a separately obtained history, performing a medically appropriate examination and/or evaluation , counseling and educating the patient/family/caregiver, ordering medications, tests, or procedures, referring and communicating with other health care professionals , documenting information in the medical record, independently interpreting results and communicating that information with the patient/family/caregiver, and care coordination    Follow Up   Return in about 2 months (around 12/17/2024), or if symptoms worsen or fail to improve, for Next scheduled follow up.    Patient was given instructions and counseling regarding her condition or for health maintenance advice. Please see specific information pulled into the AVS if appropriate.       This document has been electronically signed by Roz Kaur PA-C   October 17, 2024 12:08 EDT    Dictated Utilizing Dragon Dictation: Part of this note may be an electronic transcription/translation of spoken language to printed text using the Dragon Dictation System.

## 2024-10-22 ENCOUNTER — HOSPITAL ENCOUNTER (OUTPATIENT)
Dept: RESPIRATORY THERAPY | Facility: HOSPITAL | Age: 49
Discharge: HOME | End: 2024-10-22
Admitting: PHYSICIAN ASSISTANT
Payer: MEDICAID

## 2024-10-22 VITALS — HEART RATE: 93 BPM | OXYGEN SATURATION: 99 % | RESPIRATION RATE: 20 BRPM

## 2024-10-22 DIAGNOSIS — R06.02 SHORTNESS OF BREATH: ICD-10-CM

## 2024-10-22 PROCEDURE — 94726 PLETHYSMOGRAPHY LUNG VOLUMES: CPT

## 2024-10-22 PROCEDURE — 94060 EVALUATION OF WHEEZING: CPT

## 2024-10-22 PROCEDURE — 94760 N-INVAS EAR/PLS OXIMETRY 1: CPT

## 2024-10-22 PROCEDURE — 94729 DIFFUSING CAPACITY: CPT | Performed by: INTERNAL MEDICINE

## 2024-10-22 PROCEDURE — 94726 PLETHYSMOGRAPHY LUNG VOLUMES: CPT | Performed by: INTERNAL MEDICINE

## 2024-10-22 PROCEDURE — 94799 UNLISTED PULMONARY SVC/PX: CPT

## 2024-10-22 PROCEDURE — 94640 AIRWAY INHALATION TREATMENT: CPT

## 2024-10-22 PROCEDURE — 94060 EVALUATION OF WHEEZING: CPT | Performed by: INTERNAL MEDICINE

## 2024-10-22 PROCEDURE — 94729 DIFFUSING CAPACITY: CPT

## 2024-10-22 RX ORDER — ALBUTEROL SULFATE 0.83 MG/ML
2.5 SOLUTION RESPIRATORY (INHALATION) EVERY 4 HOURS PRN
Status: DISCONTINUED | OUTPATIENT
Start: 2024-10-22 | End: 2024-10-23 | Stop reason: HOSPADM

## 2024-10-22 RX ADMIN — ALBUTEROL SULFATE 2.5 MG: 2.5 SOLUTION RESPIRATORY (INHALATION) at 09:27

## 2024-11-07 ENCOUNTER — TELEPHONE (OUTPATIENT)
Dept: PULMONOLOGY | Facility: CLINIC | Age: 49
End: 2024-11-07
Payer: MEDICAID

## 2024-11-07 NOTE — TELEPHONE ENCOUNTER
"\"Called to speak to patient to report normal PFT results. Patient was not available, however I was able to leave a voicemail. \"                 "

## 2024-11-08 ENCOUNTER — OFFICE VISIT (OUTPATIENT)
Dept: ENDOCRINOLOGY | Facility: CLINIC | Age: 49
End: 2024-11-08
Payer: MEDICAID

## 2024-11-08 VITALS
BODY MASS INDEX: 46.53 KG/M2 | HEART RATE: 95 BPM | SYSTOLIC BLOOD PRESSURE: 118 MMHG | OXYGEN SATURATION: 95 % | DIASTOLIC BLOOD PRESSURE: 79 MMHG | WEIGHT: 271.2 LBS

## 2024-11-08 DIAGNOSIS — E28.2 PCOS (POLYCYSTIC OVARIAN SYNDROME): ICD-10-CM

## 2024-11-08 DIAGNOSIS — R79.89 LOW SERUM CORTISOL LEVEL: Primary | ICD-10-CM

## 2024-11-08 NOTE — ASSESSMENT & PLAN NOTE
-Cortisol 12.5 on 8/6/2024. Time was not specified. Unable to find any other cortisol levels that were drawn. Patient states she had previous low cortisol lab result.  -For purposes of having a complete workup, we will obtain AM cortisol. The patient will return to the hospital lab between 8 and 10 AM for this test next week at her convenience.  -Will also order ACTH stim test to be performed at the infusion center. Since cortisol levels seem to be indeterminate/inaccessible, we will proceed with ACTH stim test. As we do not have the materials to test here in the clinic, she will be scheduled in 2 weeks for this at the infusion center. Spoke with the infusion center today about set up.   -Further workup may be needed depending on results.  -24-hour urine metanephrines were collected on 8/22/2024.  Total amount of urine collected is not available.  24-hour normetanephrine and 24-hour metanephrines were low.  -CBC and CMP in April and August 2024 were both unremarkable for electrolyte abnormalities.   -Normal vitamin D 25-hydroxy in August 2024.  -Normal TSH 2.78 in August 2024.

## 2024-11-08 NOTE — PROGRESS NOTES
YOSI Rose is a 49 y.o. female who presents to the clinic today for evaluation of low cortisol. She complains of extreme tiredness, muscle weakness, reduced appetite, weight fluctuations, intermittent nausea, muscle and joint pain, depression and hair loss. She does have high blood pressure which is currently controlled with lisinopril. She also reports a history of PCOS with hirsutism. She shaves her upper lip and chin area daily. She also reports irregular periods. Her last menstrual cycle was approximately 3 months ago, but she has previously gone 2 to 3 years without periods in the past. She denies any recent steroid therapy, or any steroid therapy at all this year. She denies salt cravings, orthostatic hypotension, hyperpigmentation, or hyper/hypokalemia.     /79 (BP Location: Left arm, Patient Position: Sitting, Cuff Size: Large Adult)   Pulse 95   Wt 123 kg (271 lb 3.2 oz)   LMP  (LMP Unknown)   SpO2 95%   BMI 46.53 kg/m²      The following portions of the patient's history were reviewed and updated as appropriate: allergies, current medications, past family history, past medical history, past social history, past surgical history, and problem list    Past Medical History:   Diagnosis Date    Anxiety     Depression     Diabetes mellitus     High cholesterol     History of pulmonary embolus (PE)     Hypertension     Hypothyroidism     Sleep apnea      Past Surgical History:   Procedure Laterality Date    CHOLECYSTECTOMY      EYE SURGERY      PULMONARY EMBOLISM SURGERY        Family History   Problem Relation Age of Onset    Stroke Mother     Diabetes Father     Heart disease Father     Hypertension Father       Social History     Socioeconomic History    Marital status:    Tobacco Use    Smoking status: Never     Passive exposure: Never    Smokeless tobacco: Never   Vaping Use    Vaping status: Never Used   Substance and Sexual Activity    Alcohol use: No    Drug use: No     Sexual activity: Not Currently     Partners: Male     Birth control/protection: None     Comment: denies      Allergies   Allergen Reactions    Codeine     Prozac [Fluoxetine Hcl]       Current Outpatient Medications on File Prior to Visit   Medication Sig Dispense Refill    amLODIPine (NORVASC) 2.5 MG tablet Take 1 tablet by mouth Daily.      atorvastatin (LIPITOR) 40 MG tablet Take 1 tablet by mouth Daily.      hydrOXYzine (ATARAX) 10 MG tablet Take 1 tablet by mouth 3 times a day.      levothyroxine (SYNTHROID, LEVOTHROID) 75 MCG tablet Take 1 tablet by mouth Daily.      lisinopril (PRINIVIL,ZESTRIL) 40 MG tablet Take 1 tablet by mouth Daily.      metFORMIN (GLUCOPHAGE) 500 MG tablet Take 1 tablet by mouth Every 12 (Twelve) Hours.      metoprolol succinate XL (TOPROL-XL) 50 MG 24 hr tablet Take 1 tablet by mouth Daily.      omeprazole (priLOSEC) 20 MG capsule Take 1 capsule by mouth Daily.      pramipexole (MIRAPEX) 0.25 MG tablet Take 1 tablet by mouth Every Night.      lisinopril (PRINIVIL,ZESTRIL) 40 MG tablet Take 0.5 tablets by mouth Daily for 30 days. 15 tablet 0     No current facility-administered medications on file prior to visit.      Review of Systems   Constitutional:  Positive for fatigue, unexpected weight gain and unexpected weight loss.   Gastrointestinal:  Positive for nausea. Negative for vomiting.   Musculoskeletal:  Positive for arthralgias and myalgias.   Neurological:  Positive for weakness.   Psychiatric/Behavioral:  Positive for sleep disturbance and depressed mood. The patient is nervous/anxious.      Physical Exam  Vitals reviewed.   Constitutional:       Appearance: Normal appearance. She is obese.   Eyes:      Extraocular Movements: Extraocular movements intact.   Cardiovascular:      Rate and Rhythm: Normal rate.   Pulmonary:      Effort: Pulmonary effort is normal.   Skin:     General: Skin is warm.   Neurological:      General: No focal deficit present.      Mental Status: She is  alert and oriented to person, place, and time.   Psychiatric:         Behavior: Behavior normal.        Labs/Imaging  CMP:  Lab Results   Component Value Date    GLUCOSE 154 (H) 07/30/2024    BUN 10 07/30/2024    CREATININE 1.03 (H) 07/30/2024     07/30/2024    K 3.8 07/30/2024     07/30/2024    CALCIUM 9.6 07/30/2024    PROTEINTOT 7.7 07/30/2024    ALBUMIN 4.1 07/30/2024    ALT 35 (H) 07/30/2024    AST 27 07/30/2024    ALKPHOS 112 07/30/2024    BILITOT 0.6 07/30/2024    GLOB 3.6 07/30/2024    AGRATIO 1.1 07/30/2024    BCR 9.7 07/30/2024    ANIONGAP 9.9 07/30/2024    EGFR 66.0 07/30/2024     Lipid Panel:  Lab Results   Component Value Date    CHOL 141 10/21/2021    TRIG 67 10/21/2021    HDL 43 10/21/2021    VLDL 14 10/21/2021    LDL 84 10/21/2021     HbA1c:  Lab Results   Component Value Date    HGBA1C 6.00 (H) 07/20/2023      Glucose:  Lab Results   Component Value Date    POCGLU 156 (H) 07/22/2023     TSH:  Lab Results   Component Value Date    TSH 4.140 09/02/2023     Assessment and Plan    Diagnoses and all orders for this visit:    1. Low serum cortisol level (Primary)  Assessment & Plan:  -Cortisol 12.5 on 8/6/2024. Time was not specified. Unable to find any other cortisol levels that were drawn. Patient states she had previous low cortisol lab result.  -For purposes of having a complete workup, we will obtain AM cortisol. The patient will return to the hospital lab between 8 and 10 AM for this test next week at her convenience.  -Will also order ACTH stim test to be performed at the infusion center. Since cortisol levels seem to be indeterminate/inaccessible, we will proceed with ACTH stim test. As we do not have the materials to test here in the clinic, she will be scheduled in 2 weeks for this at the infusion center. Spoke with the infusion center today about set up.   -Further workup may be needed depending on results.  -24-hour urine metanephrines were collected on 8/22/2024.  Total amount of  urine collected is not available.  24-hour normetanephrine and 24-hour metanephrines were low.  -CBC and CMP in April and August 2024 were both unremarkable for electrolyte abnormalities.   -Normal vitamin D 25-hydroxy in August 2024.  -Normal TSH 2.78 in August 2024.    Orders:  -     Cancel: Ambulatory Referral to [facility] For Infusion Treatment  -     Ambulatory Referral to [facility] For Infusion Treatment  -     DHEA-Sulfate; Future  -     Cortisol - AM; Future    2. PCOS (polycystic ovarian syndrome)  Assessment & Plan:  -Patient complains of hirsutism and oligomenorrhea. Will evaluate for other endocrine disorders that can mimic PCOS that cause oligomenorrhea and/or h???r???michelle?nism, such as thyroid disease, nonclassic congenital adrenal hyperplasia (NCCAH), h???r?r?l??tinemia, and androgen-secreting tumors. Although menstrual dysfunction will almost always be due to ?CO? or, in some cases, NCCAH due to 21-hydroxylase deficiency, the possibilities of pregnancy, h???r?r?l??ti?emia, primary ovarian insufficiency, and thyroid disease should not be overlooked.  -Will order total testosterone, prolactin, and early morning 17-hydroxyprogesterone.  -Will check DHEAS to rule out adrenocortical carcinoma.  -Due to oligomenorrhea, will order hCG TSH and FSH to rule out other causes of irregular menstruation.      Orders:  -     Testosterone, Free, Total; Future  -     Prolactin; Future  -     17-Hydroxyprogesterone; Future  -     hCG, Serum, Qualitative; Future  -     TSH; Future  -     T4, Free; Future  -     FSH & LH; Future       Return in about 5 weeks (around 12/13/2024). The patient was instructed to contact the clinic with any interval questions or concerns.    Please note that portions of this document were completed with a voice recognition program. Efforts were made to edit the dictations, but occasionally words are mis-transcribed.  This document has been electronically signed by MARTHA Serra  November 8, 2024  14:55 EST

## 2024-11-12 ENCOUNTER — LAB (OUTPATIENT)
Dept: LAB | Facility: HOSPITAL | Age: 49
End: 2024-11-12
Payer: MEDICAID

## 2024-11-12 DIAGNOSIS — R79.89 LOW SERUM CORTISOL LEVEL: ICD-10-CM

## 2024-11-13 ENCOUNTER — LAB (OUTPATIENT)
Dept: LAB | Facility: HOSPITAL | Age: 49
End: 2024-11-13
Payer: MEDICAID

## 2024-11-13 DIAGNOSIS — R79.89 LOW SERUM CORTISOL LEVEL: ICD-10-CM

## 2024-11-13 DIAGNOSIS — E28.2 PCOS (POLYCYSTIC OVARIAN SYNDROME): ICD-10-CM

## 2024-11-13 LAB
CORTIS AM PEAK SERPL-MCNC: 9.22 MCG/DL
FSH SERPL-ACNC: 18.3 MIU/ML
HCG SERPL QL: NEGATIVE
LH SERPL-ACNC: 11 MIU/ML
PROLACTIN SERPL-MCNC: 7.05 NG/ML (ref 4.79–23.3)
T4 FREE SERPL-MCNC: 1.43 NG/DL (ref 0.92–1.68)
TSH SERPL DL<=0.05 MIU/L-ACNC: 2.67 UIU/ML (ref 0.27–4.2)

## 2024-11-13 PROCEDURE — 82627 DEHYDROEPIANDROSTERONE: CPT

## 2024-11-13 PROCEDURE — 84703 CHORIONIC GONADOTROPIN ASSAY: CPT

## 2024-11-13 PROCEDURE — 82533 TOTAL CORTISOL: CPT

## 2024-11-13 PROCEDURE — 84443 ASSAY THYROID STIM HORMONE: CPT

## 2024-11-13 PROCEDURE — 83001 ASSAY OF GONADOTROPIN (FSH): CPT

## 2024-11-13 PROCEDURE — 84146 ASSAY OF PROLACTIN: CPT

## 2024-11-13 PROCEDURE — 36415 COLL VENOUS BLD VENIPUNCTURE: CPT

## 2024-11-13 PROCEDURE — 84402 ASSAY OF FREE TESTOSTERONE: CPT

## 2024-11-13 PROCEDURE — 84439 ASSAY OF FREE THYROXINE: CPT

## 2024-11-13 PROCEDURE — 83498 ASY HYDROXYPROGESTERONE 17-D: CPT

## 2024-11-13 PROCEDURE — 84403 ASSAY OF TOTAL TESTOSTERONE: CPT

## 2024-11-13 PROCEDURE — 83002 ASSAY OF GONADOTROPIN (LH): CPT

## 2024-11-14 LAB — DHEA-S SERPL-MCNC: 168 UG/DL (ref 41.2–243.7)

## 2024-11-17 LAB
TESTOST FREE SERPL-MCNC: 5.2 PG/ML (ref 0–4.2)
TESTOST SERPL-MCNC: 38 NG/DL (ref 4–50)

## 2024-11-20 ENCOUNTER — INFUSION (OUTPATIENT)
Dept: ONCOLOGY | Facility: HOSPITAL | Age: 49
End: 2024-11-20
Payer: MEDICAID

## 2024-11-20 VITALS
OXYGEN SATURATION: 98 % | HEART RATE: 67 BPM | RESPIRATION RATE: 18 BRPM | BODY MASS INDEX: 46.94 KG/M2 | DIASTOLIC BLOOD PRESSURE: 88 MMHG | SYSTOLIC BLOOD PRESSURE: 141 MMHG | WEIGHT: 273.6 LBS | TEMPERATURE: 97.8 F

## 2024-11-20 DIAGNOSIS — R79.89 LOW SERUM CORTISOL LEVEL: Primary | ICD-10-CM

## 2024-11-20 LAB
17OHP SERPL-MCNC: 29 NG/DL
CORTIS SERPL-MCNC: 11.77 MCG/DL
CORTIS SERPL-MCNC: 16.22 MCG/DL
CORTIS SERPL-MCNC: 17.27 MCG/DL

## 2024-11-20 PROCEDURE — 25010000002 COSYNTROPIN PER 0.25 MG

## 2024-11-20 PROCEDURE — 96374 THER/PROPH/DIAG INJ IV PUSH: CPT

## 2024-11-20 PROCEDURE — 82533 TOTAL CORTISOL: CPT

## 2024-11-20 RX ORDER — COSYNTROPIN 0.25 MG/ML
0.25 INJECTION, POWDER, FOR SOLUTION INTRAMUSCULAR; INTRAVENOUS ONCE
Status: CANCELLED | OUTPATIENT
Start: 2024-11-20

## 2024-11-20 RX ORDER — COSYNTROPIN 0.25 MG/ML
0.25 INJECTION, POWDER, FOR SOLUTION INTRAMUSCULAR; INTRAVENOUS ONCE
Status: COMPLETED | OUTPATIENT
Start: 2024-11-20 | End: 2024-11-20

## 2024-11-20 RX ADMIN — COSYNTROPIN 0.25 MG: 0.25 INJECTION, POWDER, LYOPHILIZED, FOR SOLUTION INTRAVENOUS at 08:41

## 2024-12-09 ENCOUNTER — OFFICE VISIT (OUTPATIENT)
Dept: ENDOCRINOLOGY | Facility: CLINIC | Age: 49
End: 2024-12-09
Payer: MEDICAID

## 2024-12-09 VITALS
HEART RATE: 73 BPM | BODY MASS INDEX: 47.66 KG/M2 | SYSTOLIC BLOOD PRESSURE: 126 MMHG | DIASTOLIC BLOOD PRESSURE: 80 MMHG | WEIGHT: 277.8 LBS | OXYGEN SATURATION: 96 %

## 2024-12-09 DIAGNOSIS — E28.2 PCOS (POLYCYSTIC OVARIAN SYNDROME): Primary | Chronic | ICD-10-CM

## 2024-12-09 DIAGNOSIS — R79.89 LOW SERUM CORTISOL LEVEL: Chronic | ICD-10-CM

## 2024-12-09 PROCEDURE — 3079F DIAST BP 80-89 MM HG: CPT

## 2024-12-09 PROCEDURE — 1159F MED LIST DOCD IN RCRD: CPT

## 2024-12-09 PROCEDURE — 3074F SYST BP LT 130 MM HG: CPT

## 2024-12-09 PROCEDURE — 1160F RVW MEDS BY RX/DR IN RCRD: CPT

## 2024-12-09 PROCEDURE — 99214 OFFICE O/P EST MOD 30 MIN: CPT

## 2024-12-09 RX ORDER — DOXEPIN HYDROCHLORIDE 10 MG/1
10 CAPSULE ORAL 2 TIMES DAILY
COMMUNITY
Start: 2024-12-07

## 2024-12-09 RX ORDER — BUSPIRONE HYDROCHLORIDE 30 MG/1
30 TABLET ORAL 3 TIMES DAILY
COMMUNITY
Start: 2024-12-07

## 2024-12-09 NOTE — PROGRESS NOTES
Chief Complaint   Patient presents with    Follow-up     F/u w/ cortisol level     HPI   Arabella Rose is a 49 y.o. female presents to the clinic today for follow-up of low cortisol. ACTH stim test on 11/20/2024 was negative. PCOS workup negative as well. She continues to complain of hirsutism, oligomenorrhea, fatigue and hair loss. Her nausea has improved. She has been on birth control previously to help regulate her menstrual cycles, but she did have a blood clot around the time she was pregnant so it was never restarted. Her last period was approximately two months ago. Pregnancy test was negative at last appointment.     The following portions of the patient's history were reviewed and updated as appropriate: allergies, current medications, past family history, past medical history, past social history, past surgical history, and problem list.    /80 (BP Location: Left arm, Patient Position: Sitting, Cuff Size: Adult)   Pulse 73   Wt 126 kg (277 lb 12.8 oz)   LMP  (LMP Unknown)   SpO2 96%   Breastfeeding No   BMI 47.66 kg/m²    Past Medical History:   Diagnosis Date    Anxiety     Depression     Diabetes mellitus     High cholesterol     History of pulmonary embolus (PE)     Hypertension     Hypothyroidism     Polycystic ovary syndrome     Sleep apnea     Vitamin D deficiency      Past Surgical History:   Procedure Laterality Date    CHOLECYSTECTOMY      EYE SURGERY      PULMONARY EMBOLISM SURGERY        Family History   Problem Relation Age of Onset    Stroke Mother     Diabetes Father     Heart disease Father     Hypertension Father       Social History     Socioeconomic History    Marital status:    Tobacco Use    Smoking status: Never     Passive exposure: Never    Smokeless tobacco: Never   Vaping Use    Vaping status: Never Used   Substance and Sexual Activity    Alcohol use: No    Drug use: No    Sexual activity: Not Currently     Partners: Male     Birth control/protection: None      Comment: denies      Allergies   Allergen Reactions    Codeine     Prozac [Fluoxetine Hcl]       Current Outpatient Medications on File Prior to Visit   Medication Sig Dispense Refill    amLODIPine (NORVASC) 2.5 MG tablet Take 1 tablet by mouth Daily.      atorvastatin (LIPITOR) 40 MG tablet Take 1 tablet by mouth Daily.      busPIRone (BUSPAR) 30 MG tablet Take 1 tablet by mouth 3 (Three) Times a Day.      doxepin (SINEquan) 10 MG capsule Take 1 capsule by mouth 2 (Two) Times a Day.      hydrOXYzine (ATARAX) 10 MG tablet Take 1 tablet by mouth 3 times a day.      levothyroxine (SYNTHROID, LEVOTHROID) 75 MCG tablet Take 1 tablet by mouth Daily.      lisinopril (PRINIVIL,ZESTRIL) 40 MG tablet Take 1 tablet by mouth Daily.      metFORMIN (GLUCOPHAGE) 500 MG tablet Take 1 tablet by mouth Every 12 (Twelve) Hours.      metoprolol succinate XL (TOPROL-XL) 50 MG 24 hr tablet Take 1 tablet by mouth Daily.      omeprazole (priLOSEC) 20 MG capsule Take 1 capsule by mouth Daily.      pramipexole (MIRAPEX) 0.25 MG tablet Take 1 tablet by mouth Every Night.      [DISCONTINUED] lisinopril (PRINIVIL,ZESTRIL) 40 MG tablet Take 0.5 tablets by mouth Daily for 30 days. 15 tablet 0     No current facility-administered medications on file prior to visit.      Review of Systems   Constitutional:  Positive for fatigue.   Gastrointestinal:  Negative for abdominal pain and nausea.   Musculoskeletal:  Negative for myalgias.      Physical Exam  Vitals reviewed.   Constitutional:       Appearance: Normal appearance.   Eyes:      Extraocular Movements: Extraocular movements intact.   Cardiovascular:      Rate and Rhythm: Normal rate.   Pulmonary:      Effort: Pulmonary effort is normal.   Skin:     General: Skin is warm.   Neurological:      General: No focal deficit present.      Mental Status: She is alert and oriented to person, place, and time.   Psychiatric:         Mood and Affect: Mood normal.         Behavior: Behavior normal.        "  Thought Content: Thought content normal.         Judgment: Judgment normal.        Labs/Imaging  CMP:  Lab Results   Component Value Date    GLUCOSE 154 (H) 07/30/2024    BUN 10 07/30/2024    CREATININE 1.03 (H) 07/30/2024     07/30/2024    K 3.8 07/30/2024     07/30/2024    CALCIUM 9.6 07/30/2024    PROTEINTOT 7.7 07/30/2024    ALBUMIN 4.1 07/30/2024    ALT 35 (H) 07/30/2024    AST 27 07/30/2024    ALKPHOS 112 07/30/2024    BILITOT 0.6 07/30/2024    GLOB 3.6 07/30/2024    AGRATIO 1.1 07/30/2024    BCR 9.7 07/30/2024    ANIONGAP 9.9 07/30/2024    EGFR 66.0 07/30/2024     Lipid Panel:  Lab Results   Component Value Date    CHOL 141 10/21/2021    TRIG 67 10/21/2021    HDL 43 10/21/2021    VLDL 14 10/21/2021    LDL 84 10/21/2021     HbA1c:  No components found for: \"HGBA1C\"  Glucose:  Lab Results   Component Value Date    POCGLU 156 (H) 07/22/2023     TSH:  Lab Results   Component Value Date    TSH 2.670 11/13/2024     Assessment and Plan    Diagnoses and all orders for this visit:    1. PCOS (polycystic ovarian syndrome) (Primary)  Assessment & Plan:  -Patient complains of hirsutism and oligomenorrhea. Workup for other endocrine disorders that can mimic PCOS that cause oligomenorrhea and/or h???r???michelle?nism, such as nonclassic congenital adrenal hyperplasia (NCCAH), h???r?r?l??tinemia, and androgen-secreting tumors was negative 11/13/24.  -Patient is currently on levothyroxine with normal TSH.   -Discussed spironolactone for hirsutism management. The patient declines at this time as she does not want to urinate more frequently from diuretic use.   -She will follow up with gyn about potentially restarting birth control to help regulate her menstrual cycle.       2. Low serum cortisol level  Assessment & Plan:  -ACTH stim test negative 11/20/24. DHEAS normal. No evidence of adrenal insufficiency.       Other orders  -     LABS SCANNED       Return if symptoms worsen or fail to improve. The patient was " instructed to contact the clinic with any interval questions or concerns.    Please note that portions of this document were completed with a voice recognition program. Efforts were made to edit the dictations, but occasionally words are mis-transcribed.  This document has been electronically signed by MARTHA Serra  December 9, 2024 09:24 EST

## 2024-12-09 NOTE — ASSESSMENT & PLAN NOTE
-Patient complains of hirsutism and oligomenorrhea. Workup for other endocrine disorders that can mimic PCOS that cause oligomenorrhea and/or h???r???michelle?nism, such as nonclassic congenital adrenal hyperplasia (NCCAH), h???r?r?l??tinemia, and androgen-secreting tumors was negative 11/13/24.  -Patient is currently on levothyroxine with normal TSH.   -Discussed spironolactone for hirsutism management. The patient declines at this time as she does not want to urinate more frequently from diuretic use.   -She will follow up with gyn about potentially restarting birth control to help regulate her menstrual cycle.

## 2024-12-20 ENCOUNTER — OFFICE VISIT (OUTPATIENT)
Dept: PULMONOLOGY | Facility: CLINIC | Age: 49
End: 2024-12-20
Payer: MEDICAID

## 2024-12-20 VITALS
HEART RATE: 87 BPM | HEIGHT: 64 IN | DIASTOLIC BLOOD PRESSURE: 78 MMHG | WEIGHT: 273.4 LBS | BODY MASS INDEX: 46.67 KG/M2 | SYSTOLIC BLOOD PRESSURE: 126 MMHG | TEMPERATURE: 96.9 F | OXYGEN SATURATION: 95 %

## 2024-12-20 DIAGNOSIS — R06.02 SHORTNESS OF BREATH: ICD-10-CM

## 2024-12-20 DIAGNOSIS — G47.33 OSA (OBSTRUCTIVE SLEEP APNEA): Primary | ICD-10-CM

## 2024-12-20 DIAGNOSIS — J45.20 MILD INTERMITTENT ASTHMATIC BRONCHITIS WITHOUT COMPLICATION: ICD-10-CM

## 2024-12-20 PROBLEM — J45.909 ASTHMATIC BRONCHITIS: Status: ACTIVE | Noted: 2024-12-20

## 2024-12-20 PROCEDURE — 1160F RVW MEDS BY RX/DR IN RCRD: CPT | Performed by: PHYSICIAN ASSISTANT

## 2024-12-20 PROCEDURE — 1159F MED LIST DOCD IN RCRD: CPT | Performed by: PHYSICIAN ASSISTANT

## 2024-12-20 PROCEDURE — 3074F SYST BP LT 130 MM HG: CPT | Performed by: PHYSICIAN ASSISTANT

## 2024-12-20 PROCEDURE — 99214 OFFICE O/P EST MOD 30 MIN: CPT | Performed by: PHYSICIAN ASSISTANT

## 2024-12-20 PROCEDURE — 3078F DIAST BP <80 MM HG: CPT | Performed by: PHYSICIAN ASSISTANT

## 2024-12-20 RX ORDER — ALBUTEROL SULFATE 90 UG/1
2 INHALANT RESPIRATORY (INHALATION) EVERY 4 HOURS PRN
Qty: 18 G | Refills: 6 | Status: SHIPPED | OUTPATIENT
Start: 2024-12-20

## 2024-12-20 NOTE — PROGRESS NOTES
"Chief Complaint  Sleep Apnea    Subjective        Arabella Rose presents to [facility]  History of Present Illness    Mrs. Rose presents today for follow up evaluation.   Notable for sleep apnea and is on therapy at this time.   Having trouble with her sleep machine currently. Causing some smothering with use. Also noted cool air flow with the humidification rather than warm, which also makes difficult to tolerate.   Recently completed in lab sleep study/titration study.     Still has some difficulty breathing, mostly on exertion. Can note improvement with rest. Occasionally notes wheezing, cough but not acutely present at this time. Has tried albuterol in the past but doesn't have one currently.       Objective   Vital Signs:  /78   Pulse 87   Temp 96.9 °F (36.1 °C)   Ht 162.6 cm (64\")   Wt 124 kg (273 lb 6.4 oz)   SpO2 95%   BMI 46.93 kg/m²   Estimated body mass index is 46.93 kg/m² as calculated from the following:    Height as of this encounter: 162.6 cm (64\").    Weight as of this encounter: 124 kg (273 lb 6.4 oz).      Physical Exam  Vitals reviewed.   Constitutional:       General: She is not in acute distress.     Appearance: She is not diaphoretic.   HENT:      Head: Normocephalic and atraumatic.   Cardiovascular:      Rate and Rhythm: Normal rate and regular rhythm.   Pulmonary:      Effort: Pulmonary effort is normal.      Breath sounds: No wheezing, rhonchi or rales.   Neurological:      Mental Status: She is alert and oriented to person, place, and time.   Psychiatric:         Behavior: Behavior normal.          Result Review :  The following data was reviewed by: Tory Oswald PA-C on 12/20/2024:    PFT reviewed.   Normal spirometry with no significant bronchodilator effect seen on this occasion.  Diffusion capacity is normal.  Lung volumes are normal.  Flow volume loop is normal.        Per personal review - notable for " elevated airway resistance.                   -----------------------------------------------------------------------------------    Sleep titration study October 2024          -----------------------------------------------------------------------------------    Alpha 1 genotype MM        -----------------------------------------------------------------------------------    Recent labs reviewed (November 2024)  File scanned into the chart.         Assessment and Plan   Diagnoses and all orders for this visit:    1. GABBI (obstructive sleep apnea) (Primary)    2. Shortness of breath    3. Mild intermittent asthmatic bronchitis without complication    Other orders  -     albuterol sulfate  (90 Base) MCG/ACT inhaler; Inhale 2 puffs Every 4 (Four) Hours As Needed for Wheezing or Shortness of Air.  Dispense: 18 g; Refill: 6          GABBI:   Was able to receive new mask during titration study. Doing well with this.   Will change pressure settings to 18 cm per titration study report.     Management obstructive sleep apnea also includes lifestyle modifications including weight loss, avoidance of alcohol, sedated, caffeine especially before bedtime, allowing adequate sleep time, and body position during sleep such as side versus back. 10% weight loss can result in a 50% improvement of the apnea-hypopnea index.  Untreated sleep apnea can lead to cardiovascular/metabolic disorder, neurocognitive deficit, daytime sleepiness, motor vehicle accidents, depression, mood disorders and reduced quality of life.   Recommended at least 4 hours of use per night for 70% of every month (approximately 21 out of 30 days) per CMS guidelines.         Shortness of breath, possible asthmatic bronchitis:    Reviewed PFT. Overall only significant for elevated airway resistance.   Ordered albuterol inhaler for PRN use.   Will check response and then decide upon further inhaler therapy  If no benefit, will also consider further imaging  Less  suspicion for fibrosis process due to normal DLCO.         Follow Up   Return in about 2 months (around 2/20/2025), or if symptoms worsen or fail to improve, for Recheck.  Patient was given instructions and counseling regarding her condition or for health maintenance advice. Please see specific information pulled into the AVS if appropriate.

## 2025-02-21 ENCOUNTER — OFFICE VISIT (OUTPATIENT)
Dept: PULMONOLOGY | Facility: CLINIC | Age: 50
End: 2025-02-21
Payer: MEDICAID

## 2025-02-21 VITALS
WEIGHT: 276.4 LBS | HEART RATE: 78 BPM | BODY MASS INDEX: 47.19 KG/M2 | DIASTOLIC BLOOD PRESSURE: 76 MMHG | HEIGHT: 64 IN | SYSTOLIC BLOOD PRESSURE: 126 MMHG | OXYGEN SATURATION: 97 % | TEMPERATURE: 97.5 F

## 2025-02-21 DIAGNOSIS — R06.02 SHORTNESS OF BREATH: Primary | ICD-10-CM

## 2025-02-21 DIAGNOSIS — G47.33 OSA (OBSTRUCTIVE SLEEP APNEA): ICD-10-CM

## 2025-02-21 PROCEDURE — 3078F DIAST BP <80 MM HG: CPT | Performed by: PHYSICIAN ASSISTANT

## 2025-02-21 PROCEDURE — 3074F SYST BP LT 130 MM HG: CPT | Performed by: PHYSICIAN ASSISTANT

## 2025-02-21 PROCEDURE — 99214 OFFICE O/P EST MOD 30 MIN: CPT | Performed by: PHYSICIAN ASSISTANT

## 2025-02-21 NOTE — PROGRESS NOTES
"Chief Complaint  Sleep Apnea    Subjective        Arabella Rose presents to [facility]  History of Present Illness    Mrs. Rose presents today for follow up evaluation.   Notable for GABBI. Using autopap device with full facial mask. Previously tried to change to autopap of 18 cm following titration study. She is not sure that the settings were changed and still feels that the pressure is too strong.   She had initial difficulty with cold air blowing out of the device when it was previously heated. She states that the DME has not evaluated the device recently.   Previously discussed shortness of breath. This is typically noted after more strenuous exertion. Has also noted wheezing occasionally at these times.   No complaints of frequent coughing. States that shortness of breath will typically resolve within a few minutes upon rest.   She states that she still has the albuterol inhaler, but has not attempted use yet.   No previous smoking history.         Objective   Vital Signs:  /76   Pulse 78   Temp 97.5 °F (36.4 °C)   Ht 162.6 cm (64.02\")   Wt 125 kg (276 lb 6.4 oz)   SpO2 97%   BMI 47.42 kg/m²   Estimated body mass index is 47.42 kg/m² as calculated from the following:    Height as of this encounter: 162.6 cm (64.02\").    Weight as of this encounter: 125 kg (276 lb 6.4 oz).       Physical Exam  Vitals reviewed.   Constitutional:       General: She is not in acute distress.  Cardiovascular:      Rate and Rhythm: Normal rate and regular rhythm.   Pulmonary:      Effort: Pulmonary effort is normal.      Breath sounds: No wheezing, rhonchi or rales.   Neurological:      Mental Status: She is alert and oriented to person, place, and time.   Psychiatric:         Behavior: Behavior normal.        Result Review :  The following data was reviewed by: Tory Oswald PA-C on 02/21/2025:      PFT October 2024  Normal spirometry with no significant " bronchodilator effect seen on this occasion.  Diffusion capacity is normal.  Lung volumes are normal.  Flow volume loop is normal.                                     -----------------------------------------------------------------------------------    Alpha 1 genotype - MM      -----------------------------------------------------------------------------------    Sleep titration study October 2024              -----------------------------------------------------------------------------------    Chest xray imaging/report February 2023           Assessment and Plan   Diagnoses and all orders for this visit:    1. Shortness of breath (Primary)  -     Adult Transthoracic Echo Complete W/ Cont if Necessary Per Protocol; Future    2. GABBI (obstructive sleep apnea)        Obstructive sleep apnea:   Completed sleep titration study in October 2024. Tried to change settings to CPAP of 18 cm (previously on autopap 4-16 cm).   We are not sure the settings were actually changed (LENIN was not able to see this info this morning).   Having trouble with air pressure and cold air blowing rather than heated air.  Uses a full facial mask.   DME said she was due for new supplies.   Order placed for new cpap supplies.   DME order placed to assess device - cold air flow rather than heated air.   DME order placed to decrease setting by 2 (Or if still on autopap setting, decrease highest pressure limit by 2 cm).     Will reassess in 1 month. Contact sooner as needed.     Management obstructive sleep apnea also includes lifestyle modifications including weight loss, avoidance of alcohol, sedated, caffeine especially before bedtime, allowing adequate sleep time, and body position during sleep such as side versus back. 10% weight loss can result in a 50% improvement of the apnea-hypopnea index.  Untreated sleep apnea can lead to cardiovascular/metabolic disorder, neurocognitive deficit, daytime sleepiness, motor vehicle accidents, depression,  mood disorders and reduced quality of life.   Recommended at least 4 hours of use per night for 70% of every month (approximately 21 out of 30 days) per CMS guidelines.           Shortness of breath:   Typically noted with overexertion (ex. Walking up stairs). Occasional wheezing noted. Does improve quickly upon rest.   Possible asthmatic component per airway resistance on last PFT.   Has albuterol inhaler for PRN use - recommended trial of this.   Offered long acting options, did not accept at this time.   DLCO normal - low suspicion for fibrotic or emphysema process.   Can reconsider imaging as needed if symptoms worsen.  Ordered echocardiogram        Follow Up   Return in about 4 weeks (around 3/21/2025), or if symptoms worsen or fail to improve, for Recheck.  Patient was given instructions and counseling regarding her condition or for health maintenance advice. Please see specific information pulled into the AVS if appropriate.

## 2025-03-04 ENCOUNTER — HOSPITAL ENCOUNTER (OUTPATIENT)
Dept: CARDIOLOGY | Facility: HOSPITAL | Age: 50
Discharge: HOME | End: 2025-03-04
Admitting: PHYSICIAN ASSISTANT
Payer: MEDICAID

## 2025-03-04 DIAGNOSIS — R06.02 SHORTNESS OF BREATH: ICD-10-CM

## 2025-03-04 LAB
AV MEAN PRESS GRAD SYS DOP V1V2: 5 MMHG
AV VMAX SYS DOP: 135 CM/SEC
BH CV ECHO MEAS - AO MAX PG: 7.3 MMHG
BH CV ECHO MEAS - AO ROOT DIAM: 2.8 CM
BH CV ECHO MEAS - AO V2 VTI: 28 CM
BH CV ECHO MEAS - EDV(CUBED): 91.1 ML
BH CV ECHO MEAS - EDV(MOD-SP4): 49 ML
BH CV ECHO MEAS - EF(MOD-SP4): 62.4 %
BH CV ECHO MEAS - ESV(CUBED): 46.7 ML
BH CV ECHO MEAS - ESV(MOD-SP4): 18.4 ML
BH CV ECHO MEAS - FS: 20 %
BH CV ECHO MEAS - IVS/LVPW: 0.86 CM
BH CV ECHO MEAS - IVSD: 1.2 CM
BH CV ECHO MEAS - LA DIMENSION: 2.7 CM
BH CV ECHO MEAS - LAT PEAK E' VEL: 9 CM/SEC
BH CV ECHO MEAS - LV DIASTOLIC VOL/BSA (35-75): 21.8 CM2
BH CV ECHO MEAS - LV MASS(C)D: 222.6 GRAMS
BH CV ECHO MEAS - LV SYSTOLIC VOL/BSA (12-30): 8.2 CM2
BH CV ECHO MEAS - LVIDD: 4.5 CM
BH CV ECHO MEAS - LVIDS: 3.6 CM
BH CV ECHO MEAS - LVOT AREA: 3.5 CM2
BH CV ECHO MEAS - LVOT DIAM: 2.1 CM
BH CV ECHO MEAS - LVPWD: 1.4 CM
BH CV ECHO MEAS - MED PEAK E' VEL: 7.8 CM/SEC
BH CV ECHO MEAS - MV A MAX VEL: 74.9 CM/SEC
BH CV ECHO MEAS - MV E MAX VEL: 68.4 CM/SEC
BH CV ECHO MEAS - MV E/A: 0.91
BH CV ECHO MEAS - PA ACC TIME: 0.11 SEC
BH CV ECHO MEAS - RAP SYSTOLE: 10 MMHG
BH CV ECHO MEAS - RVSP: 17.4 MMHG
BH CV ECHO MEAS - SV(MOD-SP4): 30.6 ML
BH CV ECHO MEAS - SVI(MOD-SP4): 13.6 ML/M2
BH CV ECHO MEAS - TAPSE (>1.6): 1.9 CM
BH CV ECHO MEAS - TR MAX PG: 7.4 MMHG
BH CV ECHO MEAS - TR MAX VEL: 136 CM/SEC
BH CV ECHO MEASUREMENTS AVERAGE E/E' RATIO: 8.14

## 2025-03-04 PROCEDURE — 93306 TTE W/DOPPLER COMPLETE: CPT

## 2025-03-05 ENCOUNTER — TELEPHONE (OUTPATIENT)
Dept: CARDIOLOGY | Facility: CLINIC | Age: 50
End: 2025-03-05

## 2025-03-05 NOTE — TELEPHONE ENCOUNTER
Caller: Arabella Rose    Relationship to patient: Self    Best call back number: [phone]    Chief complaint: PATIENT HAS NOT BEEN SEEN IN 2 YEARS NEEDS OFFICE APPROVAL FOR SCHEDULING IT NOTHING IN CHART    Type of visit: FOLLOW-UP    Requested date: ASAP

## 2025-03-18 ENCOUNTER — OFFICE VISIT (OUTPATIENT)
Dept: CARDIOLOGY | Facility: CLINIC | Age: 50
End: 2025-03-18
Payer: MEDICAID

## 2025-03-18 VITALS
HEART RATE: 66 BPM | DIASTOLIC BLOOD PRESSURE: 72 MMHG | OXYGEN SATURATION: 96 % | BODY MASS INDEX: 47.63 KG/M2 | SYSTOLIC BLOOD PRESSURE: 104 MMHG | WEIGHT: 279 LBS | HEIGHT: 64 IN

## 2025-03-18 DIAGNOSIS — I10 PRIMARY HYPERTENSION: ICD-10-CM

## 2025-03-18 DIAGNOSIS — G47.33 OSA (OBSTRUCTIVE SLEEP APNEA): ICD-10-CM

## 2025-03-18 DIAGNOSIS — E78.5 HYPERLIPIDEMIA, UNSPECIFIED HYPERLIPIDEMIA TYPE: ICD-10-CM

## 2025-03-18 DIAGNOSIS — Z91.89 MULTIPLE RISK FACTORS FOR CORONARY ARTERY DISEASE: ICD-10-CM

## 2025-03-18 DIAGNOSIS — R06.09 EXERTIONAL DYSPNEA: Primary | ICD-10-CM

## 2025-03-18 PROCEDURE — 3078F DIAST BP <80 MM HG: CPT | Performed by: NURSE PRACTITIONER

## 2025-03-18 PROCEDURE — 99214 OFFICE O/P EST MOD 30 MIN: CPT | Performed by: NURSE PRACTITIONER

## 2025-03-18 PROCEDURE — 3074F SYST BP LT 130 MM HG: CPT | Performed by: NURSE PRACTITIONER

## 2025-03-18 PROCEDURE — 1160F RVW MEDS BY RX/DR IN RCRD: CPT | Performed by: NURSE PRACTITIONER

## 2025-03-18 PROCEDURE — 1159F MED LIST DOCD IN RCRD: CPT | Performed by: NURSE PRACTITIONER

## 2025-03-18 PROCEDURE — 93000 ELECTROCARDIOGRAM COMPLETE: CPT | Performed by: NURSE PRACTITIONER

## 2025-03-18 NOTE — Clinical Note
March 18, 2025     Benigno Verduzco PA-C  [address]   [address]  [place] [id number]    Patient: Arabella Rose   YOB: 1975   Date of Visit: 3/18/2025       Dear Benigno Verduzco PA-C    Arabella Rose was in my office today. Below is a copy of my note.    If you have questions, please do not hesitate to call me. I look forward to following Arabella along with you.         Sincerely,        MARTHA Triana        CC: MARTHA Serra    Subjective    Arabella Rose is a 49 y.o. female.   No chief complaint on file.         History of Present Illness   History of Present Illness        Patient Active Problem List   Diagnosis    Chest pain    Type 2 diabetes mellitus without complication, without long-term current use of insulin    History of cardiac murmur    Hyperlipidemia    Hypertension    Morbid obesity with BMI of 45.0-49.9, adult    JESÚS (acute kidney injury)    PCOS (polycystic ovarian syndrome)    Low serum cortisol level    GABBI (obstructive sleep apnea)    Shortness of breath    Asthmatic bronchitis     Past Medical History:   Diagnosis Date    Anxiety     Depression     Diabetes mellitus     High cholesterol     History of pulmonary embolus (PE)     Hypertension     Hypothyroidism     Polycystic ovary syndrome     Sleep apnea     Vitamin D deficiency      Past Surgical History:   Procedure Laterality Date    CHOLECYSTECTOMY      EYE SURGERY      PULMONARY EMBOLISM SURGERY         Family History   Problem Relation Age of Onset    Stroke Mother     Diabetes Father     Heart disease Father     Hypertension Father      Social History     Tobacco Use    Smoking status: Never     Passive exposure: Never    Smokeless tobacco: Never   Vaping Use    Vaping status: Never Used   Substance Use Topics    Alcohol use: No    Drug use: No         The following portions of the patient's history were reviewed and updated as appropriate: allergies, current medications, past  "family history, past medical history, past social history, past surgical history and problem list.    Allergies   Allergen Reactions    Codeine     Prozac [Fluoxetine Hcl]          Current Outpatient Medications:     albuterol sulfate  (90 Base) MCG/ACT inhaler, Inhale 2 puffs Every 4 (Four) Hours As Needed for Wheezing or Shortness of Air., Disp: 18 g, Rfl: 6    atorvastatin (LIPITOR) 40 MG tablet, Take 1 tablet by mouth Daily., Disp: , Rfl:     busPIRone (BUSPAR) 30 MG tablet, Take 1 tablet by mouth 3 (Three) Times a Day., Disp: , Rfl:     doxepin (SINEquan) 10 MG capsule, Take 1 capsule by mouth 2 (Two) Times a Day., Disp: , Rfl:     hydrOXYzine (ATARAX) 10 MG tablet, Take 1 tablet by mouth 3 times a day., Disp: , Rfl:     levothyroxine (SYNTHROID, LEVOTHROID) 75 MCG tablet, Take 1 tablet by mouth Daily., Disp: , Rfl:     lisinopril (PRINIVIL,ZESTRIL) 40 MG tablet, Take 1 tablet by mouth Daily., Disp: , Rfl:     metFORMIN (GLUCOPHAGE) 500 MG tablet, Take 1 tablet by mouth Every 12 (Twelve) Hours., Disp: , Rfl:     metoprolol succinate XL (TOPROL-XL) 50 MG 24 hr tablet, Take 1 tablet by mouth Daily., Disp: , Rfl:     omeprazole (priLOSEC) 20 MG capsule, Take 1 capsule by mouth Daily., Disp: , Rfl:     amLODIPine (NORVASC) 2.5 MG tablet, Take 1 tablet by mouth Daily., Disp: , Rfl:     pramipexole (MIRAPEX) 0.25 MG tablet, Take 1 tablet by mouth Every Night., Disp: , Rfl:     Review of Systems    /72 (BP Location: Left arm, Patient Position: Sitting, Cuff Size: Large Adult)   Pulse 66   Ht 162.6 cm (64.02\")   Wt 127 kg (279 lb)   LMP  (LMP Unknown)   SpO2 96%   BMI 47.86 kg/m²     Objective  Allergies   Allergen Reactions    Codeine     Prozac [Fluoxetine Hcl]        Physical Exam    Procedures    [unfilled]  WBC   Date Value Ref Range Status   07/30/2024 9.15 3.40 - 10.80 10*3/mm3 Final     RBC   Date Value Ref Range Status   07/30/2024 4.79 3.77 - 5.28 10*6/mm3 Final     Hemoglobin   Date Value " Ref Range Status   07/30/2024 13.7 12.0 - 15.9 g/dL Final     Hematocrit   Date Value Ref Range Status   07/30/2024 41.5 34.0 - 46.6 % Final     MCV   Date Value Ref Range Status   07/30/2024 86.6 79.0 - 97.0 fL Final     MCH   Date Value Ref Range Status   07/30/2024 28.6 26.6 - 33.0 pg Final     MCHC   Date Value Ref Range Status   07/30/2024 33.0 31.5 - 35.7 g/dL Final     RDW   Date Value Ref Range Status   07/30/2024 12.7 12.3 - 15.4 % Final     RDW-SD   Date Value Ref Range Status   07/30/2024 40.2 37.0 - 54.0 fl Final     MPV   Date Value Ref Range Status   07/30/2024 9.0 6.0 - 12.0 fL Final     Platelets   Date Value Ref Range Status   07/30/2024 338 140 - 450 10*3/mm3 Final     Neutrophil %   Date Value Ref Range Status   07/30/2024 62.2 42.7 - 76.0 % Final     Lymphocyte %   Date Value Ref Range Status   07/30/2024 28.0 19.6 - 45.3 % Final     Monocyte %   Date Value Ref Range Status   07/30/2024 6.4 5.0 - 12.0 % Final     Eosinophil %   Date Value Ref Range Status   07/30/2024 2.6 0.3 - 6.2 % Final     Basophil %   Date Value Ref Range Status   07/30/2024 0.5 0.0 - 1.5 % Final     Immature Grans %   Date Value Ref Range Status   07/30/2024 0.3 0.0 - 0.5 % Final     Neutrophils, Absolute   Date Value Ref Range Status   07/30/2024 5.68 1.70 - 7.00 10*3/mm3 Final     Lymphocytes, Absolute   Date Value Ref Range Status   07/30/2024 2.56 0.70 - 3.10 10*3/mm3 Final     Monocytes, Absolute   Date Value Ref Range Status   07/30/2024 0.59 0.10 - 0.90 10*3/mm3 Final     Eosinophils, Absolute   Date Value Ref Range Status   07/30/2024 0.24 0.00 - 0.40 10*3/mm3 Final     Basophils, Absolute   Date Value Ref Range Status   07/30/2024 0.05 0.00 - 0.20 10*3/mm3 Final     Immature Grans, Absolute   Date Value Ref Range Status   07/30/2024 0.03 0.00 - 0.05 10*3/mm3 Final     nRBC   Date Value Ref Range Status   07/30/2024 0.0 0.0 - 0.2 /100 WBC Final    LASTLAB(GLUCOSE,NA,K,CO2,CL,ANIONGAP,CREATININE,BUN,BCR,CALCIUM,EGFRIFNONA,ALKPHOS,PROTEINTOT,ALT,AST,BILITOT,ALBUMIN,GLOB,LABIL2)@  Total Cholesterol   Date Value Ref Range Status   10/21/2021 141 0 - 200 mg/dL Final     Triglycerides   Date Value Ref Range Status   10/21/2021 67 0 - 150 mg/dL Final     HDL Cholesterol   Date Value Ref Range Status   10/21/2021 43 40 - 60 mg/dL Final     LDL Cholesterol    Date Value Ref Range Status   10/21/2021 84 0 - 100 mg/dL Final       No Images in the past 120 days found..          Assessment & Plan  There are no diagnoses linked to this encounter.  No diagnosis found.  Assessment & Plan               {[facility] [employer] Provider Statement:[id number]}

## 2025-03-18 NOTE — LETTER
March 18, 2025     Benigno Verduzco PA-C  [address]   [address]  [facility] [address]    Patient: Arabella Rose   YOB: 1975   Date of Visit: 3/18/2025       Dear Benigno Verduzco PA-C,    Arabella Rose was in my office today. Below are the relevant portions of my assessment and plan of care.           If you have questions, please do not hesitate to call me. I look forward to following Arabella along with you.         Sincerely,        MARTHA Triana        CC: No Recipients

## 2025-03-18 NOTE — PROGRESS NOTES
Subjective     Arabella Rose is a 49 y.o. female.   Chief Complaint   Patient presents with    Follow-up     History of Present Illness   History of Present Illness  Arabella Rose is a 49-year-old female who presents to clinic today for cardiology follow-up she has not been seen since 2022. The patient presents for evaluation of shortness of breath, hypertension, hyperlipidemia, and diabetes mellitus.    SHe has been experiencing dyspnea on exertion, particularly during physical activities such as walking, carrying objects, or ascending stairs. This symptom has been present since his diagnosis of pulmonary embolism in 2009 but has not shown any signs of progression. SHe reports no chest pain or orthopnea. SHe is under the care of a pulmonologist for sleep apnea and uses a CPAP machine. She had a recent echocardiogram. SHe is a non-smoker.    Her blood pressure is well-managed at home, with readings consistently below 140/90. SHe is currently on a regimen of lisinopril 40 mg and metoprolol 50 mg for hypertension.    HLD, SHe is also on atorvastatin for hyperlipidemia.    DM, SHe has a known diagnosis of diabetes mellitus, which is well-controlled with metformin.    SOCIAL HISTORY  SHe does not smoke.    FAMILY HISTORY  Her father has a heart condition, high cholesterol, and high blood pressure. Her mother and sister have high cholesterol and some blood pressure issues. Her mother had a mini stroke.    MEDICATIONS  Lisinopril, metoprolol, atorvastatin, metformin      Patient Active Problem List   Diagnosis    Chest pain    Type 2 diabetes mellitus without complication, without long-term current use of insulin    History of cardiac murmur    Hyperlipidemia    Hypertension    Morbid obesity with BMI of 45.0-49.9, adult    JESÚS (acute kidney injury)    PCOS (polycystic ovarian syndrome)    Low serum cortisol level    GABBI (obstructive sleep apnea)    Shortness of breath    Asthmatic bronchitis     Past Medical  History:   Diagnosis Date    Anxiety     Depression     Diabetes mellitus     High cholesterol     History of pulmonary embolus (PE)     Hypertension     Hypothyroidism     Polycystic ovary syndrome     Sleep apnea     Vitamin D deficiency      Past Surgical History:   Procedure Laterality Date    CHOLECYSTECTOMY      EYE SURGERY      PULMONARY EMBOLISM SURGERY         Family History   Problem Relation Age of Onset    Stroke Mother     Diabetes Father     Heart disease Father     Hypertension Father      Social History     Tobacco Use    Smoking status: Never     Passive exposure: Never    Smokeless tobacco: Never   Vaping Use    Vaping status: Never Used   Substance Use Topics    Alcohol use: No    Drug use: No         The following portions of the patient's history were reviewed and updated as appropriate: allergies, current medications, past family history, past medical history, past social history, past surgical history and problem list.    Allergies   Allergen Reactions    Codeine     Prozac [Fluoxetine Hcl]          Current Outpatient Medications:     albuterol sulfate  (90 Base) MCG/ACT inhaler, Inhale 2 puffs Every 4 (Four) Hours As Needed for Wheezing or Shortness of Air., Disp: 18 g, Rfl: 6    atorvastatin (LIPITOR) 40 MG tablet, Take 1 tablet by mouth Daily., Disp: , Rfl:     busPIRone (BUSPAR) 30 MG tablet, Take 1 tablet by mouth 3 (Three) Times a Day., Disp: , Rfl:     doxepin (SINEquan) 10 MG capsule, Take 1 capsule by mouth 2 (Two) Times a Day., Disp: , Rfl:     hydrOXYzine (ATARAX) 10 MG tablet, Take 1 tablet by mouth 3 times a day., Disp: , Rfl:     levothyroxine (SYNTHROID, LEVOTHROID) 75 MCG tablet, Take 1 tablet by mouth Daily., Disp: , Rfl:     lisinopril (PRINIVIL,ZESTRIL) 40 MG tablet, Take 1 tablet by mouth Daily., Disp: , Rfl:     metFORMIN (GLUCOPHAGE) 500 MG tablet, Take 1 tablet by mouth Every 12 (Twelve) Hours., Disp: , Rfl:     metoprolol succinate XL (TOPROL-XL) 50 MG 24 hr  "tablet, Take 1 tablet by mouth Daily., Disp: , Rfl:     omeprazole (priLOSEC) 20 MG capsule, Take 1 capsule by mouth Daily., Disp: , Rfl:     amLODIPine (NORVASC) 2.5 MG tablet, Take 1 tablet by mouth Daily., Disp: , Rfl:     pramipexole (MIRAPEX) 0.25 MG tablet, Take 1 tablet by mouth Every Night., Disp: , Rfl:     Review of Systems   Constitutional:  Negative for activity change, appetite change, chills, diaphoresis, fatigue and fever.   HENT:  Negative for congestion, drooling, ear discharge, ear pain, mouth sores, nosebleeds, postnasal drip, rhinorrhea, sinus pressure, sneezing and sore throat.    Eyes:  Negative for pain, discharge and visual disturbance.   Respiratory:  Positive for shortness of breath. Negative for cough, chest tightness and wheezing.    Cardiovascular:  Negative for chest pain, palpitations and leg swelling.   Gastrointestinal:  Negative for abdominal pain, constipation, diarrhea, nausea and vomiting.   Endocrine: Negative for cold intolerance, heat intolerance, polydipsia, polyphagia and polyuria.   Musculoskeletal:  Negative for arthralgias, myalgias and neck pain.   Skin:  Negative for rash and wound.   Neurological:  Negative for dizziness, syncope, speech difficulty, weakness, light-headedness and headaches.   Hematological:  Negative for adenopathy. Does not bruise/bleed easily.   Psychiatric/Behavioral:  Negative for confusion, dysphoric mood and sleep disturbance. The patient is not nervous/anxious.    All other systems reviewed and are negative.    /72 (BP Location: Left arm, Patient Position: Sitting, Cuff Size: Large Adult)   Pulse 66   Ht 162.6 cm (64.02\")   Wt 127 kg (279 lb)   LMP  (LMP Unknown)   SpO2 96%   BMI 47.86 kg/m²     Objective   Allergies   Allergen Reactions    Codeine     Prozac [Fluoxetine Hcl]      Physical Exam  Vitals reviewed.   Constitutional:       Appearance: Normal appearance. She is well-developed. She is obese.   HENT:      Head: " Normocephalic.      Right Ear: Tympanic membrane normal.      Left Ear: Tympanic membrane normal.      Nose: Nose normal.   Eyes:      Conjunctiva/sclera: Conjunctivae normal.      Pupils: Pupils are equal, round, and reactive to light.   Neck:      Thyroid: No thyromegaly.      Vascular: No carotid bruit or JVD.   Cardiovascular:      Rate and Rhythm: Normal rate and regular rhythm.   Pulmonary:      Effort: Pulmonary effort is normal.      Breath sounds: Normal breath sounds.   Abdominal:      General: Bowel sounds are normal.      Palpations: Abdomen is soft. There is no hepatomegaly, splenomegaly or mass.      Tenderness: There is no abdominal tenderness.   Musculoskeletal:         General: Normal range of motion.      Cervical back: Neck supple.      Right lower leg: No edema.      Left lower leg: No edema.   Skin:     General: Skin is warm and dry.   Neurological:      Mental Status: She is alert and oriented to person, place, and time.   Psychiatric:         Attention and Perception: Attention normal.         Mood and Affect: Mood normal.         Speech: Speech normal.         Behavior: Behavior normal. Behavior is cooperative.         Cognition and Memory: Cognition normal.           ECG 12 Lead    Date/Time: 3/18/2025 12:15 PM  Performed by: Divine Allan APRN    Authorized by: Divine Allan APRN  Comparison: compared with previous ECG   Similar to previous ECG  Comparison to previous EC2022  Rhythm: sinus rhythm  Rate: normal  BPM: 66  Other findings: non-specific ST-T wave changes and low voltage    Clinical impression: non-specific ECG  Comments: QT/QTc - 402/416          LABS  WBC   Date Value Ref Range Status   2024 9.15 3.40 - 10.80 10*3/mm3 Final     RBC   Date Value Ref Range Status   2024 4.79 3.77 - 5.28 10*6/mm3 Final     Hemoglobin   Date Value Ref Range Status   2024 13.7 12.0 - 15.9 g/dL Final     Hematocrit   Date Value Ref Range Status   2024 41.5  34.0 - 46.6 % Final     MCV   Date Value Ref Range Status   07/30/2024 86.6 79.0 - 97.0 fL Final     MCH   Date Value Ref Range Status   07/30/2024 28.6 26.6 - 33.0 pg Final     MCHC   Date Value Ref Range Status   07/30/2024 33.0 31.5 - 35.7 g/dL Final     RDW   Date Value Ref Range Status   07/30/2024 12.7 12.3 - 15.4 % Final     RDW-SD   Date Value Ref Range Status   07/30/2024 40.2 37.0 - 54.0 fl Final     MPV   Date Value Ref Range Status   07/30/2024 9.0 6.0 - 12.0 fL Final     Platelets   Date Value Ref Range Status   07/30/2024 338 140 - 450 10*3/mm3 Final     Neutrophil %   Date Value Ref Range Status   07/30/2024 62.2 42.7 - 76.0 % Final     Lymphocyte %   Date Value Ref Range Status   07/30/2024 28.0 19.6 - 45.3 % Final     Monocyte %   Date Value Ref Range Status   07/30/2024 6.4 5.0 - 12.0 % Final     Eosinophil %   Date Value Ref Range Status   07/30/2024 2.6 0.3 - 6.2 % Final     Basophil %   Date Value Ref Range Status   07/30/2024 0.5 0.0 - 1.5 % Final     Immature Grans %   Date Value Ref Range Status   07/30/2024 0.3 0.0 - 0.5 % Final     Neutrophils, Absolute   Date Value Ref Range Status   07/30/2024 5.68 1.70 - 7.00 10*3/mm3 Final     Lymphocytes, Absolute   Date Value Ref Range Status   07/30/2024 2.56 0.70 - 3.10 10*3/mm3 Final     Monocytes, Absolute   Date Value Ref Range Status   07/30/2024 0.59 0.10 - 0.90 10*3/mm3 Final     Eosinophils, Absolute   Date Value Ref Range Status   07/30/2024 0.24 0.00 - 0.40 10*3/mm3 Final     Basophils, Absolute   Date Value Ref Range Status   07/30/2024 0.05 0.00 - 0.20 10*3/mm3 Final     Immature Grans, Absolute   Date Value Ref Range Status   07/30/2024 0.03 0.00 - 0.05 10*3/mm3 Final     nRBC   Date Value Ref Range Status   07/30/2024 0.0 0.0 - 0.2 /100 WBC Final       Total Cholesterol   Date Value Ref Range Status   10/21/2021 141 0 - 200 mg/dL Final     Triglycerides   Date Value Ref Range Status   10/21/2021 67 0 - 150 mg/dL Final     HDL  Cholesterol   Date Value Ref Range Status   10/21/2021 43 40 - 60 mg/dL Final     LDL Cholesterol    Date Value Ref Range Status   10/21/2021 84 0 - 100 mg/dL Final         Assessment & Plan   Diagnoses and all orders for this visit:    1. Exertional dyspnea (Primary)  -     BNP; Future  -     Basic Metabolic Panel; Future  -     Stress Test With Myocardial Perfusion (1 Day); Future  -     ECG 12 Lead  The patient's dyspnea on exertion may be attributed to various factors, including hypertension, hyperlipidemia, diabetes mellitus, and a significant family history of cardiovascular disease. Her  echocardiogram from 2021 was unremarkable, and a nuclear stress test at that time did not indicate any ischemic changes. However, a subsequent echocardiogram revealed mild concentric ventricular hypertrophy. The diastolic function was consistent with grade 1 impaired relaxation which could be a normal variant however further testing is recommended. Today's EKG did not reveal any acute abnormalities, although there were some nonspecific changes. Given her multiple risk factors and intermittent symptoms, a repeat stress test is recommended. Additional laboratory studies will be conducted to further investigate the cause of her dyspnea. SHe is advised to continue using his CPAP machine. If anything changes, new symptoms develop, worsen, She should not hesitate to be reevaluated.    2. Hyperlipidemia, unspecified hyperlipidemia type  SHe is currently taking atorvastatin (Lipitor). Her LDL levels were within the normal range as per the last lab results from November. SHe should continue her current medication regimen.    3. Primary hypertension  SHe is currently on lisinopril 40 mg and metoprolol 50 mg. Her blood pressure is generally controlled at home. Mild left ventricular hypertrophy noted on the recent echocardiogram, pt is on betablocker therapy. SHe should continue her current medication regimen.    4. GABBI (obstructive  sleep apnea)  Encourage in compliance    5. Multiple risk factors for coronary artery disease  Aggressive risk factor modification    Diabetes Mellitus.  SHe is currently on metformin and reports that her blood sugar levels are well-controlled. SHe should continue her current medication regimen.    Assessment & Plan    Review of medical record    Lifestyle modification including heart healthy diet, regular exercise, maintenance of desirable body weight and avoidance of tobacco product    Follow-up  The patient will follow up in 1 month.             Patient or patient representative verbalized consent for the use of Ambient Listening during the visit with  MARTHA Triana for chart documentation. 3/18/2025  12:19 EDT

## 2025-03-31 ENCOUNTER — OFFICE VISIT (OUTPATIENT)
Dept: PULMONOLOGY | Facility: CLINIC | Age: 50
End: 2025-03-31
Payer: MEDICAID

## 2025-03-31 VITALS
HEIGHT: 64 IN | WEIGHT: 277.6 LBS | HEART RATE: 68 BPM | TEMPERATURE: 95.5 F | SYSTOLIC BLOOD PRESSURE: 128 MMHG | OXYGEN SATURATION: 94 % | DIASTOLIC BLOOD PRESSURE: 78 MMHG | BODY MASS INDEX: 47.39 KG/M2

## 2025-03-31 DIAGNOSIS — G47.33 OSA (OBSTRUCTIVE SLEEP APNEA): Primary | ICD-10-CM

## 2025-03-31 DIAGNOSIS — R06.02 SHORTNESS OF BREATH: ICD-10-CM

## 2025-03-31 PROCEDURE — 3078F DIAST BP <80 MM HG: CPT | Performed by: PHYSICIAN ASSISTANT

## 2025-03-31 PROCEDURE — 3074F SYST BP LT 130 MM HG: CPT | Performed by: PHYSICIAN ASSISTANT

## 2025-03-31 PROCEDURE — 99214 OFFICE O/P EST MOD 30 MIN: CPT | Performed by: PHYSICIAN ASSISTANT

## 2025-03-31 RX ORDER — FLUTICASONE PROPIONATE 110 UG/1
2 AEROSOL, METERED RESPIRATORY (INHALATION)
Qty: 12 G | Refills: 11 | Status: SHIPPED | OUTPATIENT
Start: 2025-03-31

## 2025-03-31 NOTE — PROGRESS NOTES
"Chief Complaint  Shortness of Breath    Subjective        Arabella Rose presents to [facility]  History of Present Illness    Mrs. Rose presents today for follow up.   Starting to improve tolerance of her autopap device after recent setting changes. Notes now that the pressure does seem more appropriate (16 cm better tolerated than the previous 18 cm).   Also feels that the heated portion is correct after acquiring the correct tubing.     Previously discussed shortness of breath. This is noted with more strenuous exertion. Has noted wheezing on occasion. Overall no frequent coughing. Has an albuterol inhaler, not tried much yet.   No previous smoking history.       Objective   Vital Signs:  /78   Pulse 68   Temp 95.5 °F (35.3 °C)   Ht 162.6 cm (64.02\")   Wt 126 kg (277 lb 9.6 oz)   SpO2 94%   BMI 47.63 kg/m²   Estimated body mass index is 47.63 kg/m² as calculated from the following:    Height as of this encounter: 162.6 cm (64.02\").    Weight as of this encounter: 126 kg (277 lb 9.6 oz).        Physical Exam  Vitals reviewed.   Constitutional:       General: She is not in acute distress.  Cardiovascular:      Rate and Rhythm: Normal rate and regular rhythm.   Pulmonary:      Effort: Pulmonary effort is normal.      Breath sounds: No wheezing, rhonchi or rales.   Neurological:      Mental Status: She is alert and oriented to person, place, and time.   Psychiatric:         Behavior: Behavior normal.         Result Review :  The following data was reviewed by: Tory Oswald PA-C on 03/31/2025:      PFT October 2024  Normal spirometry with no significant bronchodilator effect seen on this occasion.  Diffusion capacity is normal.  Lung volumes are normal.  Flow volume loop is normal.                           -----------------------------------------------------------------------------------     Alpha 1 genotype - MM      "   -----------------------------------------------------------------------------------     Sleep titration study October 2024                -----------------------------------------------------------------------------------     Chest xray imaging/report February 2023           Assessment and Plan   Diagnoses and all orders for this visit:    1. GABBI (obstructive sleep apnea) (Primary)    2. Shortness of breath  -     CT Chest Without Contrast; Future    Other orders  -     fluticasone (FLOVENT HFA) 110 MCG/ACT inhaler; Inhale 2 puffs 2 (Two) Times a Day. NOTE: swish & spit after use to avoid oral thrush.  Dispense: 12 g; Refill: 11          Obstructive sleep apnea:   Completed sleep titration study in October 2024. Tried to change settings to CPAP of 18 cm (previously on autopap 4-16 cm).    Autopap better tolerated after CPAP was changed to 16 cm  Compliance report reviewed   Tubing also corrected to blow warmer air, better tolerated    Management obstructive sleep apnea also includes lifestyle modifications including weight loss, avoidance of alcohol, sedated, caffeine especially before bedtime, allowing adequate sleep time, and body position during sleep such as side versus back. 10% weight loss can result in a 50% improvement of the apnea-hypopnea index.  Untreated sleep apnea can lead to cardiovascular/metabolic disorder, neurocognitive deficit, daytime sleepiness, motor vehicle accidents, depression, mood disorders and reduced quality of life.   Recommended at least 4 hours of use per night for 70% of every month (approximately 21 out of 30 days) per CMS guidelines.              Shortness of breath:   Typically noted with moderate-higher exertion (ex. Walking up stairs). Occasional wheezing noted. Does improve quickly upon rest.   Possible asthmatic component per airway resistance on last PFT.   Has albuterol inhaler to use PRN  Ordered Flovent  mcg - can use up to 2 sprays BID  Rinse mouth out after use  to avoid oral yeast.  Ordered CT chest without contrast.   Echo reviewed - now following with cardiology.             Follow Up   Return in about 2 months (around 5/31/2025), or if symptoms worsen or fail to improve, for Recheck.  Patient was given instructions and counseling regarding her condition or for health maintenance advice. Please see specific information pulled into the AVS if appropriate.

## 2025-04-08 ENCOUNTER — RESULTS FOLLOW-UP (OUTPATIENT)
Dept: CARDIOLOGY | Facility: CLINIC | Age: 50
End: 2025-04-08
Payer: MEDICAID

## 2025-04-08 ENCOUNTER — HOSPITAL ENCOUNTER (OUTPATIENT)
Dept: NUCLEAR MEDICINE | Facility: HOSPITAL | Age: 50
Discharge: HOME | End: 2025-04-08
Payer: MEDICAID

## 2025-04-08 ENCOUNTER — LAB (OUTPATIENT)
Dept: LAB | Facility: HOSPITAL | Age: 50
End: 2025-04-08
Payer: MEDICAID

## 2025-04-08 ENCOUNTER — HOSPITAL ENCOUNTER (OUTPATIENT)
Dept: CARDIOLOGY | Facility: HOSPITAL | Age: 50
Discharge: HOME | End: 2025-04-08
Payer: MEDICAID

## 2025-04-08 DIAGNOSIS — R06.09 EXERTIONAL DYSPNEA: ICD-10-CM

## 2025-04-08 LAB
ANION GAP SERPL CALCULATED.3IONS-SCNC: 7.1 MMOL/L (ref 5–15)
BH CV NUCLEAR PRIOR STUDY: 3
BH CV REST NUCLEAR ISOTOPE DOSE: 10.1 MCI
BH CV STRESS BP STAGE 1: NORMAL
BH CV STRESS DURATION MIN STAGE 1: 3
BH CV STRESS DURATION MIN STAGE 2: 1
BH CV STRESS DURATION SEC STAGE 1: 0
BH CV STRESS DURATION SEC STAGE 2: 17
BH CV STRESS GRADE STAGE 1: 10
BH CV STRESS GRADE STAGE 2: 12
BH CV STRESS HR STAGE 1: 129
BH CV STRESS HR STAGE 2: 148
BH CV STRESS METS STAGE 1: 5
BH CV STRESS METS STAGE 2: 7.5
BH CV STRESS NUCLEAR ISOTOPE DOSE: 30.2 MCI
BH CV STRESS PROTOCOL 1: NORMAL
BH CV STRESS RECOVERY BP: NORMAL MMHG
BH CV STRESS RECOVERY HR: 92 BPM
BH CV STRESS SPEED STAGE 1: 1.7
BH CV STRESS SPEED STAGE 2: 2.5
BH CV STRESS STAGE 1: 1
BH CV STRESS STAGE 2: 2
BUN SERPL-MCNC: 15 MG/DL (ref 6–20)
BUN/CREAT SERPL: 16.9 (ref 7–25)
CALCIUM SPEC-SCNC: 9.4 MG/DL (ref 8.6–10.5)
CHLORIDE SERPL-SCNC: 105 MMOL/L (ref 98–107)
CO2 SERPL-SCNC: 30.9 MMOL/L (ref 22–29)
CREAT SERPL-MCNC: 0.89 MG/DL (ref 0.57–1)
EGFRCR SERPLBLD CKD-EPI 2021: 78.6 ML/MIN/1.73
GLUCOSE SERPL-MCNC: 125 MG/DL (ref 65–99)
MAXIMAL PREDICTED HEART RATE: 169 BPM
NT-PROBNP SERPL-MCNC: 96.6 PG/ML (ref 0–900)
PERCENT MAX PREDICTED HR: 87.57 %
POTASSIUM SERPL-SCNC: 3.8 MMOL/L (ref 3.5–5.2)
SODIUM SERPL-SCNC: 143 MMOL/L (ref 136–145)
SPECT HRT GATED+EF W RNC IV: 61 %
STRESS BASELINE BP: NORMAL MMHG
STRESS BASELINE HR: 74 BPM
STRESS PERCENT HR: 103 %
STRESS POST ESTIMATED WORKLOAD: 7 METS
STRESS POST EXERCISE DUR MIN: 4 MIN
STRESS POST EXERCISE DUR SEC: 17 SEC
STRESS POST PEAK BP: NORMAL MMHG
STRESS POST PEAK HR: 148 BPM
STRESS TARGET HR: 144 BPM

## 2025-04-08 PROCEDURE — A9500 TC99M SESTAMIBI: HCPCS | Performed by: NURSE PRACTITIONER

## 2025-04-08 PROCEDURE — 34310000005 TECHNETIUM SESTAMIBI: Performed by: NURSE PRACTITIONER

## 2025-04-08 PROCEDURE — 83880 ASSAY OF NATRIURETIC PEPTIDE: CPT

## 2025-04-08 PROCEDURE — 78452 HT MUSCLE IMAGE SPECT MULT: CPT

## 2025-04-08 PROCEDURE — 93018 CV STRESS TEST I&R ONLY: CPT | Performed by: INTERNAL MEDICINE

## 2025-04-08 PROCEDURE — 80048 BASIC METABOLIC PNL TOTAL CA: CPT

## 2025-04-08 PROCEDURE — 78452 HT MUSCLE IMAGE SPECT MULT: CPT | Performed by: INTERNAL MEDICINE

## 2025-04-08 PROCEDURE — 36415 COLL VENOUS BLD VENIPUNCTURE: CPT

## 2025-04-08 PROCEDURE — 93017 CV STRESS TEST TRACING ONLY: CPT

## 2025-04-08 RX ADMIN — TECHNETIUM TC 99M SESTAMIBI 1 DOSE: 1 INJECTION INTRAVENOUS at 08:18

## 2025-04-08 RX ADMIN — TECHNETIUM TC 99M SESTAMIBI 1 DOSE: 1 INJECTION INTRAVENOUS at 09:30

## 2025-04-09 NOTE — TELEPHONE ENCOUNTER
Tried to call pt, no answer. Left detailed vm adv she needs to keep appt to discuss test results.

## 2025-04-10 ENCOUNTER — HOSPITAL ENCOUNTER (OUTPATIENT)
Facility: HOSPITAL | Age: 50
Discharge: HOME | End: 2025-04-10
Admitting: PHYSICIAN ASSISTANT
Payer: MEDICAID

## 2025-04-10 DIAGNOSIS — R06.02 SHORTNESS OF BREATH: ICD-10-CM

## 2025-04-10 PROCEDURE — 71250 CT THORAX DX C-: CPT | Performed by: RADIOLOGY

## 2025-04-10 PROCEDURE — 71250 CT THORAX DX C-: CPT

## 2025-04-15 ENCOUNTER — OFFICE VISIT (OUTPATIENT)
Dept: CARDIOLOGY | Facility: CLINIC | Age: 50
End: 2025-04-15
Payer: MEDICAID

## 2025-04-15 VITALS
DIASTOLIC BLOOD PRESSURE: 84 MMHG | BODY MASS INDEX: 47.8 KG/M2 | OXYGEN SATURATION: 93 % | SYSTOLIC BLOOD PRESSURE: 126 MMHG | WEIGHT: 280 LBS | HEIGHT: 64 IN | HEART RATE: 80 BPM

## 2025-04-15 DIAGNOSIS — E78.5 HYPERLIPIDEMIA, UNSPECIFIED HYPERLIPIDEMIA TYPE: ICD-10-CM

## 2025-04-15 DIAGNOSIS — G47.33 OSA (OBSTRUCTIVE SLEEP APNEA): ICD-10-CM

## 2025-04-15 DIAGNOSIS — R94.39 ABNORMAL NUCLEAR STRESS TEST: ICD-10-CM

## 2025-04-15 DIAGNOSIS — Z91.89 MULTIPLE RISK FACTORS FOR CORONARY ARTERY DISEASE: ICD-10-CM

## 2025-04-15 DIAGNOSIS — I10 PRIMARY HYPERTENSION: Primary | ICD-10-CM

## 2025-04-15 PROCEDURE — 3074F SYST BP LT 130 MM HG: CPT | Performed by: NURSE PRACTITIONER

## 2025-04-15 PROCEDURE — 3079F DIAST BP 80-89 MM HG: CPT | Performed by: NURSE PRACTITIONER

## 2025-04-15 PROCEDURE — 1160F RVW MEDS BY RX/DR IN RCRD: CPT | Performed by: NURSE PRACTITIONER

## 2025-04-15 PROCEDURE — 1159F MED LIST DOCD IN RCRD: CPT | Performed by: NURSE PRACTITIONER

## 2025-04-15 PROCEDURE — 99214 OFFICE O/P EST MOD 30 MIN: CPT | Performed by: NURSE PRACTITIONER

## 2025-04-15 NOTE — Clinical Note
May 2, 2025     Benigno Verduzco PA-C  [address]   [address]  [place] [id number]    Patient: Arabella Rose   YOB: 1975   Date of Visit: 4/15/2025       Dear Benigno Verduzco PA-C    Arabella Rose was in my office today. Below is a copy of my note.    If you have questions, please do not hesitate to call me. I look forward to following Arabella along with you.         Sincerely,        MARTHA Triana        CC: MARTHA Serra    Subjective    Arabella Rose is a 49 y.o. female.   Chief Complaint   Patient presents with    Results     Stress test      History of Present Illness   History of Present Illness  SHe has been experiencing dyspnea on exertion, particularly during physical activities such as walking, carrying objects, or ascending stairs. This symptom has been present since his diagnosis of pulmonary embolism in 2009 but has not shown any signs of progression. SHe reports no chest pain or orthopnea. SHe is under the care of a pulmonologist for sleep apnea and uses a CPAP machine. She had a recent echocardiogram. SHe is a non-smoker.     Her blood pressure is well-managed at home, with readings consistently below 140/90. SHe is currently on a regimen of lisinopril 40 mg and metoprolol 50 mg for hypertension.     HLD, SHe is also on atorvastatin for hyperlipidemia.     DM, SHe has a known diagnosis of diabetes mellitus, which is well-controlled with metformin.      Patient Active Problem List   Diagnosis    Chest pain    Type 2 diabetes mellitus without complication, without long-term current use of insulin    History of cardiac murmur    Hyperlipidemia    Hypertension    Morbid obesity with BMI of 45.0-49.9, adult    JESÚS (acute kidney injury)    PCOS (polycystic ovarian syndrome)    Low serum cortisol level    GABBI (obstructive sleep apnea)    Shortness of breath    Asthmatic bronchitis     Past Medical History:   Diagnosis Date    Anxiety     Depression      Diabetes mellitus     High cholesterol     History of pulmonary embolus (PE)     Hypertension     Hypothyroidism     Polycystic ovary syndrome     Sleep apnea     Vitamin D deficiency      Past Surgical History:   Procedure Laterality Date    CHOLECYSTECTOMY      EYE SURGERY      PULMONARY EMBOLISM SURGERY         Family History   Problem Relation Age of Onset    Stroke Mother     Diabetes Father     Heart disease Father     Hypertension Father      Social History     Tobacco Use    Smoking status: Never     Passive exposure: Never    Smokeless tobacco: Never   Vaping Use    Vaping status: Never Used   Substance Use Topics    Alcohol use: No    Drug use: No         The following portions of the patient's history were reviewed and updated as appropriate: allergies, current medications, past family history, past medical history, past social history, past surgical history and problem list.    Allergies   Allergen Reactions    Codeine     Prozac [Fluoxetine Hcl]          Current Outpatient Medications:     albuterol sulfate  (90 Base) MCG/ACT inhaler, Inhale 2 puffs Every 4 (Four) Hours As Needed for Wheezing or Shortness of Air., Disp: 18 g, Rfl: 6    atorvastatin (LIPITOR) 40 MG tablet, Take 1 tablet by mouth Daily., Disp: , Rfl:     busPIRone (BUSPAR) 30 MG tablet, Take 1 tablet by mouth 3 (Three) Times a Day., Disp: , Rfl:     doxepin (SINEquan) 10 MG capsule, Take 1 capsule by mouth 2 (Two) Times a Day., Disp: , Rfl:     fluticasone (FLOVENT HFA) 110 MCG/ACT inhaler, Inhale 2 puffs 2 (Two) Times a Day. NOTE: swish & spit after use to avoid oral thrush., Disp: 12 g, Rfl: 11    hydrOXYzine (ATARAX) 10 MG tablet, Take 1 tablet by mouth 3 times a day., Disp: , Rfl:     levothyroxine (SYNTHROID, LEVOTHROID) 75 MCG tablet, Take 1 tablet by mouth Daily., Disp: , Rfl:     lisinopril (PRINIVIL,ZESTRIL) 40 MG tablet, Take 1 tablet by mouth Daily., Disp: , Rfl:     metFORMIN (GLUCOPHAGE) 500 MG tablet, Take 1 tablet  "by mouth Every 12 (Twelve) Hours., Disp: , Rfl:     metoprolol succinate XL (TOPROL-XL) 50 MG 24 hr tablet, Take 1 tablet by mouth Daily., Disp: , Rfl:     omeprazole (priLOSEC) 20 MG capsule, Take 1 capsule by mouth Daily., Disp: , Rfl:     Review of Systems    /84 (BP Location: Left arm, Patient Position: Sitting, Cuff Size: Large Adult)   Pulse 80   Ht 162.6 cm (64.02\")   Wt 127 kg (280 lb)   LMP  (LMP Unknown)   SpO2 93%   BMI 48.03 kg/m²     Objective  Allergies   Allergen Reactions    Codeine     Prozac [Fluoxetine Hcl]        Physical Exam    Procedures    [unfilled]  WBC   Date Value Ref Range Status   07/30/2024 9.15 3.40 - 10.80 10*3/mm3 Final     RBC   Date Value Ref Range Status   07/30/2024 4.79 3.77 - 5.28 10*6/mm3 Final     Hemoglobin   Date Value Ref Range Status   07/30/2024 13.7 12.0 - 15.9 g/dL Final     Hematocrit   Date Value Ref Range Status   07/30/2024 41.5 34.0 - 46.6 % Final     MCV   Date Value Ref Range Status   07/30/2024 86.6 79.0 - 97.0 fL Final     MCH   Date Value Ref Range Status   07/30/2024 28.6 26.6 - 33.0 pg Final     MCHC   Date Value Ref Range Status   07/30/2024 33.0 31.5 - 35.7 g/dL Final     RDW   Date Value Ref Range Status   07/30/2024 12.7 12.3 - 15.4 % Final     RDW-SD   Date Value Ref Range Status   07/30/2024 40.2 37.0 - 54.0 fl Final     MPV   Date Value Ref Range Status   07/30/2024 9.0 6.0 - 12.0 fL Final     Platelets   Date Value Ref Range Status   07/30/2024 338 140 - 450 10*3/mm3 Final     Neutrophil %   Date Value Ref Range Status   07/30/2024 62.2 42.7 - 76.0 % Final     Lymphocyte %   Date Value Ref Range Status   07/30/2024 28.0 19.6 - 45.3 % Final     Monocyte %   Date Value Ref Range Status   07/30/2024 6.4 5.0 - 12.0 % Final     Eosinophil %   Date Value Ref Range Status   07/30/2024 2.6 0.3 - 6.2 % Final     Basophil %   Date Value Ref Range Status   07/30/2024 0.5 0.0 - 1.5 % Final     Immature Grans %   Date Value Ref Range Status "   07/30/2024 0.3 0.0 - 0.5 % Final     Neutrophils, Absolute   Date Value Ref Range Status   07/30/2024 5.68 1.70 - 7.00 10*3/mm3 Final     Lymphocytes, Absolute   Date Value Ref Range Status   07/30/2024 2.56 0.70 - 3.10 10*3/mm3 Final     Monocytes, Absolute   Date Value Ref Range Status   07/30/2024 0.59 0.10 - 0.90 10*3/mm3 Final     Eosinophils, Absolute   Date Value Ref Range Status   07/30/2024 0.24 0.00 - 0.40 10*3/mm3 Final     Basophils, Absolute   Date Value Ref Range Status   07/30/2024 0.05 0.00 - 0.20 10*3/mm3 Final     Immature Grans, Absolute   Date Value Ref Range Status   07/30/2024 0.03 0.00 - 0.05 10*3/mm3 Final     nRBC   Date Value Ref Range Status   07/30/2024 0.0 0.0 - 0.2 /100 WBC Final   LASTLAB(GLUCOSE,NA,K,CO2,CL,ANIONGAP,CREATININE,BUN,BCR,CALCIUM,EGFRIFNONA,ALKPHOS,PROTEINTOT,ALT,AST,BILITOT,ALBUMIN,GLOB,LABIL2)@  Total Cholesterol   Date Value Ref Range Status   10/21/2021 141 0 - 200 mg/dL Final     Triglycerides   Date Value Ref Range Status   10/21/2021 67 0 - 150 mg/dL Final     HDL Cholesterol   Date Value Ref Range Status   10/21/2021 43 40 - 60 mg/dL Final     LDL Cholesterol    Date Value Ref Range Status   10/21/2021 84 0 - 100 mg/dL Final       CT Chest Without Contrast Diagnostic  Result Date: 4/10/2025  1. No acute thoracic findings.  2. Other findings as above.      This report was finalized on 4/10/2025 10:30 AM by Dr. Musa Henriquez MD.              Assessment & Plan  There are no diagnoses linked to this encounter.  No diagnosis found.  Assessment & Plan               {[facility] Provider Statement:[id number]}

## 2025-04-15 NOTE — PROGRESS NOTES
Subjective     Arabella Rose is a 49 y.o. female.   Chief Complaint   Patient presents with    Results     Stress test      History of Present Illness   History of Present Illness  Arabella Rose is a 49 y.o. female who presents to the clinic today for cardiology follow up.     Blood pressure is reported to be within normal range today, although there is uncertainty regarding the accuracy of the home blood pressure monitor. Current medications include lisinopril 40 mg daily and metoprolol 50 mg daily.    Diabetes is diagnosed but not currently managed with medication or insulin. Diet control is implied.    A history of asthma and sleep apnea is noted. Recent initiation of CPAP therapy is reported. Pulmonologist care is ongoing, and an inhaler has been prescribed.    Cholesterol management includes Lipitor 40 mg daily. Dietary habits indicate minimal salt consumption.    Due to some intermittent exertional dyspnea she has recently completed testing and returns today to discuss results. SHe is under the care of a pulmonologist for sleep apnea and uses a CPAP machine. She had a recent echocardiogram with a normal LVEF and no significant valvular disease. She is a non-smoker.     SOCIAL HISTORY  Diet: Uses seasoning but hardly uses salt.  Tobacco: Does not smoke.      Patient Active Problem List   Diagnosis    Chest pain    Type 2 diabetes mellitus without complication, without long-term current use of insulin    History of cardiac murmur    Hyperlipidemia    Hypertension    Morbid obesity with BMI of 45.0-49.9, adult    JESÚS (acute kidney injury)    PCOS (polycystic ovarian syndrome)    Low serum cortisol level    GABBI (obstructive sleep apnea)    Shortness of breath    Asthmatic bronchitis     Past Medical History:   Diagnosis Date    Anxiety     Depression     Diabetes mellitus     High cholesterol     History of pulmonary embolus (PE)     Hypertension     Hypothyroidism     Polycystic ovary syndrome     Sleep  apnea     Vitamin D deficiency      Past Surgical History:   Procedure Laterality Date    CHOLECYSTECTOMY      EYE SURGERY      PULMONARY EMBOLISM SURGERY         Family History   Problem Relation Age of Onset    Stroke Mother     Diabetes Father     Heart disease Father     Hypertension Father      Social History     Tobacco Use    Smoking status: Never     Passive exposure: Never    Smokeless tobacco: Never   Vaping Use    Vaping status: Never Used   Substance Use Topics    Alcohol use: No    Drug use: No         The following portions of the patient's history were reviewed and updated as appropriate: allergies, current medications, past family history, past medical history, past social history, past surgical history and problem list.    Allergies   Allergen Reactions    Codeine     Prozac [Fluoxetine Hcl]          Current Outpatient Medications:     albuterol sulfate  (90 Base) MCG/ACT inhaler, Inhale 2 puffs Every 4 (Four) Hours As Needed for Wheezing or Shortness of Air., Disp: 18 g, Rfl: 6    atorvastatin (LIPITOR) 40 MG tablet, Take 1 tablet by mouth Daily., Disp: , Rfl:     busPIRone (BUSPAR) 30 MG tablet, Take 1 tablet by mouth 3 (Three) Times a Day., Disp: , Rfl:     doxepin (SINEquan) 10 MG capsule, Take 1 capsule by mouth 2 (Two) Times a Day., Disp: , Rfl:     fluticasone (FLOVENT HFA) 110 MCG/ACT inhaler, Inhale 2 puffs 2 (Two) Times a Day. NOTE: swish & spit after use to avoid oral thrush., Disp: 12 g, Rfl: 11    hydrOXYzine (ATARAX) 10 MG tablet, Take 1 tablet by mouth 3 times a day., Disp: , Rfl:     levothyroxine (SYNTHROID, LEVOTHROID) 75 MCG tablet, Take 1 tablet by mouth Daily., Disp: , Rfl:     lisinopril (PRINIVIL,ZESTRIL) 40 MG tablet, Take 1 tablet by mouth Daily., Disp: , Rfl:     metFORMIN (GLUCOPHAGE) 500 MG tablet, Take 1 tablet by mouth Every 12 (Twelve) Hours., Disp: , Rfl:     metoprolol succinate XL (TOPROL-XL) 50 MG 24 hr tablet, Take 1 tablet by mouth Daily., Disp: , Rfl:      "omeprazole (priLOSEC) 20 MG capsule, Take 1 capsule by mouth Daily., Disp: , Rfl:     Review of Systems   Constitutional:  Negative for activity change, appetite change, chills, diaphoresis, fatigue and fever.   HENT:  Negative for congestion, drooling, ear discharge, ear pain, mouth sores, nosebleeds, postnasal drip, rhinorrhea, sinus pressure, sneezing and sore throat.    Eyes:  Negative for pain, discharge and visual disturbance.   Respiratory:  Positive for shortness of breath. Negative for cough, chest tightness and wheezing.    Cardiovascular:  Negative for chest pain, palpitations and leg swelling.   Gastrointestinal:  Negative for abdominal pain, constipation, diarrhea, nausea and vomiting.   Endocrine: Negative for cold intolerance, heat intolerance, polydipsia, polyphagia and polyuria.   Musculoskeletal:  Negative for arthralgias, myalgias and neck pain.   Skin:  Negative for rash and wound.   Neurological:  Negative for dizziness, syncope, speech difficulty, weakness, light-headedness and headaches.   Hematological:  Negative for adenopathy. Does not bruise/bleed easily.   Psychiatric/Behavioral:  Negative for confusion, dysphoric mood and sleep disturbance. The patient is not nervous/anxious.    All other systems reviewed and are negative.    /84 (BP Location: Left arm, Patient Position: Sitting, Cuff Size: Large Adult)   Pulse 80   Ht 162.6 cm (64.02\")   Wt 127 kg (280 lb)   LMP  (LMP Unknown)   SpO2 93%   BMI 48.03 kg/m²     Objective   Allergies   Allergen Reactions    Codeine     Prozac [Fluoxetine Hcl]        Physical Exam  Vitals reviewed.   Constitutional:       Appearance: Normal appearance. She is well-developed. She is obese.   HENT:      Head: Normocephalic.      Right Ear: Tympanic membrane normal.      Left Ear: Tympanic membrane normal.      Nose: Nose normal.   Eyes:      Conjunctiva/sclera: Conjunctivae normal.      Pupils: Pupils are equal, round, and reactive to light. "   Neck:      Thyroid: No thyromegaly.      Vascular: No carotid bruit or JVD.   Cardiovascular:      Rate and Rhythm: Normal rate and regular rhythm.   Pulmonary:      Effort: Pulmonary effort is normal.      Breath sounds: Normal breath sounds.   Abdominal:      General: Bowel sounds are normal.      Palpations: Abdomen is soft. There is no hepatomegaly, splenomegaly or mass.      Tenderness: There is no abdominal tenderness.   Musculoskeletal:         General: Normal range of motion.      Cervical back: Neck supple.      Right lower leg: No edema.      Left lower leg: No edema.   Skin:     General: Skin is warm and dry.   Neurological:      Mental Status: She is alert and oriented to person, place, and time.   Psychiatric:         Attention and Perception: Attention normal.         Mood and Affect: Mood normal.         Speech: Speech normal.         Behavior: Behavior normal. Behavior is cooperative.         Cognition and Memory: Cognition normal.         LABS  WBC   Date Value Ref Range Status   07/30/2024 9.15 3.40 - 10.80 10*3/mm3 Final     RBC   Date Value Ref Range Status   07/30/2024 4.79 3.77 - 5.28 10*6/mm3 Final     Hemoglobin   Date Value Ref Range Status   07/30/2024 13.7 12.0 - 15.9 g/dL Final     Hematocrit   Date Value Ref Range Status   07/30/2024 41.5 34.0 - 46.6 % Final     MCV   Date Value Ref Range Status   07/30/2024 86.6 79.0 - 97.0 fL Final     MCH   Date Value Ref Range Status   07/30/2024 28.6 26.6 - 33.0 pg Final     MCHC   Date Value Ref Range Status   07/30/2024 33.0 31.5 - 35.7 g/dL Final     RDW   Date Value Ref Range Status   07/30/2024 12.7 12.3 - 15.4 % Final     RDW-SD   Date Value Ref Range Status   07/30/2024 40.2 37.0 - 54.0 fl Final     MPV   Date Value Ref Range Status   07/30/2024 9.0 6.0 - 12.0 fL Final     Platelets   Date Value Ref Range Status   07/30/2024 338 140 - 450 10*3/mm3 Final     Neutrophil %   Date Value Ref Range Status   07/30/2024 62.2 42.7 - 76.0 % Final      Lymphocyte %   Date Value Ref Range Status   07/30/2024 28.0 19.6 - 45.3 % Final     Monocyte %   Date Value Ref Range Status   07/30/2024 6.4 5.0 - 12.0 % Final     Eosinophil %   Date Value Ref Range Status   07/30/2024 2.6 0.3 - 6.2 % Final     Basophil %   Date Value Ref Range Status   07/30/2024 0.5 0.0 - 1.5 % Final     Immature Grans %   Date Value Ref Range Status   07/30/2024 0.3 0.0 - 0.5 % Final     Neutrophils, Absolute   Date Value Ref Range Status   07/30/2024 5.68 1.70 - 7.00 10*3/mm3 Final     Lymphocytes, Absolute   Date Value Ref Range Status   07/30/2024 2.56 0.70 - 3.10 10*3/mm3 Final     Monocytes, Absolute   Date Value Ref Range Status   07/30/2024 0.59 0.10 - 0.90 10*3/mm3 Final     Eosinophils, Absolute   Date Value Ref Range Status   07/30/2024 0.24 0.00 - 0.40 10*3/mm3 Final     Basophils, Absolute   Date Value Ref Range Status   07/30/2024 0.05 0.00 - 0.20 10*3/mm3 Final     Immature Grans, Absolute   Date Value Ref Range Status   07/30/2024 0.03 0.00 - 0.05 10*3/mm3 Final     nRBC   Date Value Ref Range Status   07/30/2024 0.0 0.0 - 0.2 /100 WBC Final       Total Cholesterol   Date Value Ref Range Status   10/21/2021 141 0 - 200 mg/dL Final     Triglycerides   Date Value Ref Range Status   10/21/2021 67 0 - 150 mg/dL Final     HDL Cholesterol   Date Value Ref Range Status   10/21/2021 43 40 - 60 mg/dL Final     LDL Cholesterol    Date Value Ref Range Status   10/21/2021 84 0 - 100 mg/dL Final     IMAGING  CT Chest Without Contrast Diagnostic  Result Date: 4/10/2025  1. No acute thoracic findings.  2. Other findings as above.      This report was finalized on 4/10/2025 10:30 AM by Dr. Musa Henriquez MD.      Nuclear Stress Test   Interpretation Summary       A stress test was performed following the [facility] protocol.    Resting EKG showed sinus rhythm at a rate of 74 bpm.  Nonspecific ST and T wave changes were noted.    Patient exercised for 4 minutes and 17 seconds during a workload of  7 METS.  Heart rate and blood pressure response was appropriate recovery was normal.  Patient did not experience any chest pain.    ST segments did not show any diagnostic changes.  No significant arrhythmia detected.    Findings consistent with a normal ECG stress test.    Left ventricular ejection fraction is normal (Calculated EF = 61%).    Size images show mildly decreased uptake involving the anteroapical segment of the left ventricle, rest images show mild reversibility in this area. TID 0.9.    Myocardial perfusion imaging indicates a small-sized, mildly severe area of ischemia located in the anteroapical segment of the left ventricle.    Impressions are consistent with an intermediate risk study.             Assessment & Plan   Diagnoses and all orders for this visit:    1. Primary hypertension (Primary)    2. Hyperlipidemia, unspecified hyperlipidemia type    3. Multiple risk factors for coronary artery disease    4. GABBI (obstructive sleep apnea)    5. Abnormal nuclear stress test        Assessment & Plan  1. Hypertension:  Continue current medication regimen (lisinopril 40 mg daily and metoprolol 50 mg daily).  Monitor blood pressure regularly.  Seek medical attention if new symptoms or worsening of existing ones occur.    2. Hyperlipidemia: LDL 71  Continue current medication regimen (Lipitor 40 mg daily).  Check cholesterol levels prior to next visit.  LDL goal < 70   Heart healthy diet     3. Diabetes Mellitus:  Maintain tight control of blood sugar levels through diet and regular monitoring.  Seek medical attention if symptoms worsen or new symptoms occur.    4. Sleep Apnea: Elevated CO2  Continue using CPAP machine.  Consult pulmonologist regarding adjustments to oxygenation therapy.    5. Diastolic Dysfunction:  Continue current medications, including metoprolol.  Consider low-dose diuretic if symptoms worsen.  No clinical evidence of heart failure, normal BNP     6.  Abnormal nuclear stress  test  Reviewed and discussed nuclear stress test with an area of concern. discussed treatment options however she is clinically asymptomatic and elects to monitor for now.  Will continue with aggressive risk factor modification and medical management. If new or worsening symptoms develop further evaluation would be warranted.     Follow-up  Follow up in 3 months.    Review of medical record including recent nuclear stress test    Aggressive risk factor modification with heart healthy diet, regular exercise, maintenance of desirable body weight and avoidance of tobacco products                 Patient or patient representative verbalized consent for the use of Ambient Listening during the visit with  MARTHA Triana for chart documentation. 5/2/2025  21:53 EDT

## 2025-04-15 NOTE — LETTER
May 2, 2025     Benigno Verduzco PA-C  [address]   [address]  [facility] [id number]    Patient: Arabella Rose   YOB: 1975   Date of Visit: 4/15/2025       Dear Benigno Verduzco PA-C,    Arabella Rose was in my office today. Below are the relevant portions of my assessment and plan of care.           If you have questions, please do not hesitate to call me. I look forward to following Arabella along with you.         Sincerely,        MARTHA Triana        CC: No Recipients

## 2025-04-21 ENCOUNTER — TELEPHONE (OUTPATIENT)
Dept: PULMONOLOGY | Facility: CLINIC | Age: 50
End: 2025-04-21
Payer: MEDICAID

## 2025-04-21 NOTE — TELEPHONE ENCOUNTER
"\"Called patient to report normal CT results. Patient was not available, however I left a voicemail.\"                 "

## 2025-08-07 ENCOUNTER — OFFICE VISIT (OUTPATIENT)
Dept: CARDIOLOGY | Facility: CLINIC | Age: 50
End: 2025-08-07
Payer: MEDICAID

## 2025-08-07 VITALS
WEIGHT: 276.4 LBS | SYSTOLIC BLOOD PRESSURE: 106 MMHG | HEIGHT: 64 IN | HEART RATE: 75 BPM | DIASTOLIC BLOOD PRESSURE: 77 MMHG | OXYGEN SATURATION: 96 % | BODY MASS INDEX: 47.19 KG/M2

## 2025-08-07 DIAGNOSIS — E11.9 TYPE 2 DIABETES MELLITUS WITHOUT COMPLICATION, WITHOUT LONG-TERM CURRENT USE OF INSULIN: ICD-10-CM

## 2025-08-07 DIAGNOSIS — I10 PRIMARY HYPERTENSION: ICD-10-CM

## 2025-08-07 DIAGNOSIS — Z91.89 MULTIPLE RISK FACTORS FOR CORONARY ARTERY DISEASE: ICD-10-CM

## 2025-08-07 DIAGNOSIS — R94.39 ABNORMAL NUCLEAR STRESS TEST: Primary | ICD-10-CM

## 2025-08-07 DIAGNOSIS — G47.33 OSA (OBSTRUCTIVE SLEEP APNEA): ICD-10-CM

## 2025-08-07 DIAGNOSIS — E78.5 HYPERLIPIDEMIA, UNSPECIFIED HYPERLIPIDEMIA TYPE: ICD-10-CM
